# Patient Record
Sex: FEMALE | Race: WHITE | NOT HISPANIC OR LATINO | URBAN - METROPOLITAN AREA
[De-identification: names, ages, dates, MRNs, and addresses within clinical notes are randomized per-mention and may not be internally consistent; named-entity substitution may affect disease eponyms.]

---

## 2017-04-01 ENCOUNTER — EMERGENCY (EMERGENCY)
Facility: HOSPITAL | Age: 42
LOS: 1 days | Discharge: HOME | End: 2017-04-01
Attending: EMERGENCY MEDICINE | Admitting: EMERGENCY MEDICINE
Payer: MEDICARE

## 2017-04-01 VITALS
OXYGEN SATURATION: 100 % | RESPIRATION RATE: 15 BRPM | TEMPERATURE: 98 F | HEART RATE: 88 BPM | DIASTOLIC BLOOD PRESSURE: 91 MMHG | SYSTOLIC BLOOD PRESSURE: 143 MMHG

## 2017-04-01 VITALS
DIASTOLIC BLOOD PRESSURE: 93 MMHG | OXYGEN SATURATION: 100 % | SYSTOLIC BLOOD PRESSURE: 131 MMHG | RESPIRATION RATE: 18 BRPM | HEART RATE: 78 BPM

## 2017-04-01 LAB
ALBUMIN SERPL ELPH-MCNC: 4.3 G/DL — SIGNIFICANT CHANGE UP (ref 3.3–5)
ALP SERPL-CCNC: 76 U/L — SIGNIFICANT CHANGE UP (ref 40–120)
ALT FLD-CCNC: 11 U/L — SIGNIFICANT CHANGE UP (ref 4–33)
AST SERPL-CCNC: 18 U/L — SIGNIFICANT CHANGE UP (ref 4–32)
BASOPHILS # BLD AUTO: 0 K/UL — SIGNIFICANT CHANGE UP (ref 0–0.2)
BASOPHILS NFR BLD AUTO: 0 % — SIGNIFICANT CHANGE UP (ref 0–2)
BILIRUB SERPL-MCNC: 0.3 MG/DL — SIGNIFICANT CHANGE UP (ref 0.2–1.2)
BUN SERPL-MCNC: 12 MG/DL — SIGNIFICANT CHANGE UP (ref 7–23)
CALCIUM SERPL-MCNC: 10.3 MG/DL — SIGNIFICANT CHANGE UP (ref 8.4–10.5)
CHLORIDE SERPL-SCNC: 105 MMOL/L — SIGNIFICANT CHANGE UP (ref 98–107)
CK MB BLD-MCNC: 1 NG/ML — SIGNIFICANT CHANGE UP (ref 1–4.7)
CK MB BLD-MCNC: 1 NG/ML — SIGNIFICANT CHANGE UP (ref 1–4.7)
CK SERPL-CCNC: 62 U/L — SIGNIFICANT CHANGE UP (ref 25–170)
CK SERPL-CCNC: 65 U/L — SIGNIFICANT CHANGE UP (ref 25–170)
CO2 SERPL-SCNC: 21 MMOL/L — LOW (ref 22–31)
CREAT SERPL-MCNC: 0.78 MG/DL — SIGNIFICANT CHANGE UP (ref 0.5–1.3)
D DIMER BLD IA.RAPID-MCNC: < 150 NG/ML — SIGNIFICANT CHANGE UP
EOSINOPHIL # BLD AUTO: 0 K/UL — SIGNIFICANT CHANGE UP (ref 0–0.5)
EOSINOPHIL NFR BLD AUTO: 0 % — SIGNIFICANT CHANGE UP (ref 0–6)
GLUCOSE SERPL-MCNC: 92 MG/DL — SIGNIFICANT CHANGE UP (ref 70–99)
HCT VFR BLD CALC: 42.2 % — SIGNIFICANT CHANGE UP (ref 34.5–45)
HGB BLD-MCNC: 14.3 G/DL — SIGNIFICANT CHANGE UP (ref 11.5–15.5)
IMM GRANULOCYTES NFR BLD AUTO: 0.2 % — SIGNIFICANT CHANGE UP (ref 0–1.5)
LYMPHOCYTES # BLD AUTO: 2.76 K/UL — SIGNIFICANT CHANGE UP (ref 1–3.3)
LYMPHOCYTES # BLD AUTO: 33.8 % — SIGNIFICANT CHANGE UP (ref 13–44)
MCHC RBC-ENTMCNC: 31.6 PG — SIGNIFICANT CHANGE UP (ref 27–34)
MCHC RBC-ENTMCNC: 33.9 % — SIGNIFICANT CHANGE UP (ref 32–36)
MCV RBC AUTO: 93.2 FL — SIGNIFICANT CHANGE UP (ref 80–100)
MONOCYTES # BLD AUTO: 0.52 K/UL — SIGNIFICANT CHANGE UP (ref 0–0.9)
MONOCYTES NFR BLD AUTO: 6.4 % — SIGNIFICANT CHANGE UP (ref 2–14)
NEUTROPHILS # BLD AUTO: 4.86 K/UL — SIGNIFICANT CHANGE UP (ref 1.8–7.4)
NEUTROPHILS NFR BLD AUTO: 59.6 % — SIGNIFICANT CHANGE UP (ref 43–77)
PLATELET # BLD AUTO: 283 K/UL — SIGNIFICANT CHANGE UP (ref 150–400)
PMV BLD: 9.8 FL — SIGNIFICANT CHANGE UP (ref 7–13)
POTASSIUM SERPL-MCNC: 4.9 MMOL/L — SIGNIFICANT CHANGE UP (ref 3.5–5.3)
POTASSIUM SERPL-SCNC: 4.9 MMOL/L — SIGNIFICANT CHANGE UP (ref 3.5–5.3)
PROT SERPL-MCNC: 7.5 G/DL — SIGNIFICANT CHANGE UP (ref 6–8.3)
RBC # BLD: 4.53 M/UL — SIGNIFICANT CHANGE UP (ref 3.8–5.2)
RBC # FLD: 13.3 % — SIGNIFICANT CHANGE UP (ref 10.3–14.5)
SODIUM SERPL-SCNC: 142 MMOL/L — SIGNIFICANT CHANGE UP (ref 135–145)
TROPONIN T SERPL-MCNC: < 0.06 NG/ML — SIGNIFICANT CHANGE UP (ref 0–0.06)
TROPONIN T SERPL-MCNC: < 0.06 NG/ML — SIGNIFICANT CHANGE UP (ref 0–0.06)
WBC # BLD: 8.16 K/UL — SIGNIFICANT CHANGE UP (ref 3.8–10.5)
WBC # FLD AUTO: 8.16 K/UL — SIGNIFICANT CHANGE UP (ref 3.8–10.5)

## 2017-04-01 PROCEDURE — 71020: CPT | Mod: 26

## 2017-04-01 PROCEDURE — 99284 EMERGENCY DEPT VISIT MOD MDM: CPT

## 2017-04-01 RX ORDER — VENLAFAXINE HCL 75 MG
150 CAPSULE, EXT RELEASE 24 HR ORAL ONCE
Qty: 0 | Refills: 0 | Status: COMPLETED | OUTPATIENT
Start: 2017-04-01 | End: 2017-04-01

## 2017-04-01 RX ORDER — ACETAMINOPHEN 500 MG
650 TABLET ORAL ONCE
Qty: 0 | Refills: 0 | Status: COMPLETED | OUTPATIENT
Start: 2017-04-01 | End: 2017-04-01

## 2017-04-01 RX ORDER — IBUPROFEN 200 MG
600 TABLET ORAL ONCE
Qty: 0 | Refills: 0 | Status: COMPLETED | OUTPATIENT
Start: 2017-04-01 | End: 2017-04-01

## 2017-04-01 RX ORDER — FAMOTIDINE 10 MG/ML
20 INJECTION INTRAVENOUS ONCE
Qty: 0 | Refills: 0 | Status: COMPLETED | OUTPATIENT
Start: 2017-04-01 | End: 2017-04-01

## 2017-04-01 RX ORDER — LAMOTRIGINE 25 MG/1
150 TABLET, ORALLY DISINTEGRATING ORAL ONCE
Qty: 0 | Refills: 0 | Status: COMPLETED | OUTPATIENT
Start: 2017-04-01 | End: 2017-04-01

## 2017-04-01 RX ORDER — QUETIAPINE FUMARATE 200 MG/1
50 TABLET, FILM COATED ORAL ONCE
Qty: 0 | Refills: 0 | Status: COMPLETED | OUTPATIENT
Start: 2017-04-01 | End: 2017-04-01

## 2017-04-01 RX ADMIN — FAMOTIDINE 20 MILLIGRAM(S): 10 INJECTION INTRAVENOUS at 17:56

## 2017-04-01 RX ADMIN — Medication 0.5 MILLIGRAM(S): at 17:56

## 2017-04-01 RX ADMIN — LAMOTRIGINE 150 MILLIGRAM(S): 25 TABLET, ORALLY DISINTEGRATING ORAL at 17:56

## 2017-04-01 RX ADMIN — Medication 600 MILLIGRAM(S): at 18:01

## 2017-04-01 RX ADMIN — Medication 650 MILLIGRAM(S): at 14:19

## 2017-04-01 RX ADMIN — QUETIAPINE FUMARATE 50 MILLIGRAM(S): 200 TABLET, FILM COATED ORAL at 17:56

## 2017-04-01 RX ADMIN — Medication 150 MILLIGRAM(S): at 14:19

## 2017-04-01 NOTE — ED ADULT TRIAGE NOTE - CHIEF COMPLAINT QUOTE
PMH depression on Effexor, Seroquel, Ativan and Lamictal for seizures PMH depression on Effexor, Seroquel, Ativan and Lamictal for seizures  reports anxiety attack this am,

## 2017-04-01 NOTE — ED ADULT NURSE NOTE - OBJECTIVE STATEMENT
Jitendra RN: Pt recvd in intake rm 12, aox3, ambulatory, c/o constant stabbing R sided chest pain x 2 days, worse with deep breaths. Pt admits to being under stress for the past few days. Took Naproxen last night with no relief. Denies SOB, abd pain, n/v/d. SL placed, lab sent, NAD, will cont to monitor

## 2017-04-01 NOTE — PROVIDER CONTACT NOTE (OTHER) - ASSESSMENT
Medical team referred this case as pt has been medically cleared for discharge. Writer spoke with the medical team but Pt does not know the whereabouts and requesting cab.   Writer contacted [facility] - [phone] spoke with Roberto and confirmation # [id number] and pt will be picked up by [facility] cab service around 7 :40 pm. Writer informed the medical team about this intervention.

## 2017-04-01 NOTE — ED PROVIDER NOTE - PMH
Endometriosis    Epilepsy    Genital herpes    High cholesterol    PTSD (post-traumatic stress disorder)

## 2017-04-01 NOTE — ED PROVIDER NOTE - MEDICAL DECISION MAKING DETAILS
41 year old female with a PMHx of depression, psychogenic epileptic seizures, epilepsy, PTSD pw non-exertional CP for 2 days.  Plan: CBC, CMP, CE x2 EKGx2

## 2017-04-01 NOTE — ED PROVIDER NOTE - CARE PLAN
Principal Discharge DX:	Chest pain  Instructions for follow-up, activity and diet:	Take Motrin 600mg every 6 hours for mild-moderate pain as needed or alternate with Tylenol 650mg every 4 hours for pain as needed.  Follow up with your PCP within 48 hours for further evaluation.  Return to the Emergency Department for any new, worsening or concerning symptoms.

## 2017-04-01 NOTE — ED PROVIDER NOTE - ATTENDING CONTRIBUTION TO CARE
I , My Salgado M.D. have examined the patient and confirmed the essential components of the history, physical examination, diagnosis, and treatment plan. I agree with the patient's care as documented by the PA and amended herein by me. See note above for complete details of service.  42 yo F multiple med Hx Endometriosis, Epilepsy, HLD, PTSD who pw non-exertional CP x 2 d. R pectoral worse with inspiration. No other associated complaints. Exam reveals clear lungs, R upper chest wall ttp. CXR reveals NAD. Trop neg x 2. D-dimer neg. Pt remained HD stable during ED obs period. Pt stable for discharge. PMD follow up within 24 - 48 hours. DC instructions and warning signs for return given.

## 2017-04-01 NOTE — ED ADULT NURSE NOTE - CHIEF COMPLAINT QUOTE
PMH depression on Effexor, Seroquel, Ativan and Lamictal for seizures  reports anxiety attack this am,

## 2017-04-01 NOTE — ED PROVIDER NOTE - PLAN OF CARE
Take Motrin 600mg every 6 hours for mild-moderate pain as needed or alternate with Tylenol 650mg every 4 hours for pain as needed.  Follow up with your PCP within 48 hours for further evaluation.  Return to the Emergency Department for any new, worsening or concerning symptoms.

## 2017-04-01 NOTE — ED PROVIDER NOTE - OBJECTIVE STATEMENT
41 year old female with a PMHx of depression, psychogenic epileptic seizures, epilepsy, PTSD pw non-exertional CP for 2 days. Pain started off mild and intermittent 2 days ago then became constant, sharp in nature, 8/10 in severity, localized to the right pectoral region and worse with deep breaths this morning at 09:00 am. Admits to occasional left sided weakness which she is currently experiencing and shallow breathing secondary to pain with deep breaths. States that she has increased life stressors in the past day. Took Naproxen last night with no relief. Denies n/v/f/c, diaphoresis, jaw/shoulder pain, numbness/tingling in extremity abdominal pain, recent exercise/heavy lifting, swelling/tenderness in calves, foreign travel, tobacco use, OCP use, prolonged stasis. 41 year old female with a PMHx of depression, psychogenic epileptic seizures, epilepsy, PTSD pw non-exertional CP for 2 days. Pain started off mild and intermittent 2 days ago then became constant, sharp in nature, 8/10 in severity, localized to the right pectoral region and worse with deep breaths this morning at 09:00 am when she was sitting eating breakfast. Admits to occasional left sided weakness which she is currently experiencing and shallow breathing secondary to pain with deep breaths. States that she has increased life stressors in the past day. Took Naproxen last night with no relief. Denies syncope, dizziness, n/v/f/c, diaphoresis, jaw/shoulder pain, numbness/tingling in extremity abdominal pain, recent exercise/heavy lifting, swelling/tenderness in calves, foreign travel, tobacco use, OCP use, prolonged stasis.

## 2017-07-12 ENCOUNTER — EMERGENCY (EMERGENCY)
Facility: HOSPITAL | Age: 42
LOS: 1 days | Discharge: HOME | End: 2017-07-12
Attending: EMERGENCY MEDICINE | Admitting: EMERGENCY MEDICINE
Payer: MEDICARE

## 2017-07-12 VITALS
OXYGEN SATURATION: 100 % | HEART RATE: 90 BPM | RESPIRATION RATE: 16 BRPM | DIASTOLIC BLOOD PRESSURE: 74 MMHG | TEMPERATURE: 97 F | SYSTOLIC BLOOD PRESSURE: 129 MMHG

## 2017-07-12 DIAGNOSIS — F33.40 MAJOR DEPRESSIVE DISORDER, RECURRENT, IN REMISSION, UNSPECIFIED: ICD-10-CM

## 2017-07-12 PROCEDURE — 99284 EMERGENCY DEPT VISIT MOD MDM: CPT

## 2017-07-12 PROCEDURE — 90792 PSYCH DIAG EVAL W/MED SRVCS: CPT

## 2017-07-12 RX ADMIN — Medication 0.5 MILLIGRAM(S): at 12:44

## 2017-07-12 RX ADMIN — Medication 0.5 MILLIGRAM(S): at 17:43

## 2017-07-12 NOTE — ED PROVIDER NOTE - OBJECTIVE STATEMENT
43 y/o female with PMHx of Bipolar and Seizures on Lamictal presents to ED for difficulty sleeping X 1 week. Pt states having a history of bipolar with current episode of feeling manic and not sleeping for the past week. Pt denies any headache, fever, chills, nausea, vomiting, palpitations, SOB, chest pain, or abd pain. Denies any SI, HI, or hallucinations.
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
## 2017-07-12 NOTE — ED PROVIDER NOTE - PMH
Bipolar 1 disorder    Endometriosis    Epilepsy    Genital herpes    High cholesterol    Major depression    PTSD (post-traumatic stress disorder)    Seizures
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
## 2017-07-12 NOTE — ED PROVIDER NOTE - MEDICAL DECISION MAKING DETAILS
41 y/o female with difficulty sleeping and concern over a manic episode. Concern for Bipolar. Psych.

## 2017-07-12 NOTE — ED BEHAVIORAL HEALTH ASSESSMENT NOTE - CASE SUMMARY
42 year old, single, non-caregiver, disabled, domiciled [facility] in [place] since 12/14,  female with history of Borderline Personality Disorder, Psychogenic Seizures, Bipolar Disorder and post-traumatic stress disorder, history of suicide attempt, superficially cut her neck with glass, did not require sutures, no scars, PMH, no history of substance abuse, history of trauma, no legal issues or access to firearms, BIB EMS from home after patient felt manic and wanted to be evaluated.    Patient reports she felt "manic" and anxious and needed an evaluation. Patient goes into lengthy details about her mood swings; after lengthy speech, reports she feels better and would like to go home. Patient as a long history of similar presentations. Appears to be at her baseline. She denies SI/HI/I/P as well as judson and psychosis. Does not meet criteria for inpatient admission and will be discharged home with above plan.
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
## 2017-07-12 NOTE — ED BEHAVIORAL HEALTH NOTE - BEHAVIORAL HEALTH NOTE
Writer contacted [facility] to inquire about patient's transporation that was scheduled for 2pm.  Writer was informed they are, "on the way".

## 2017-07-12 NOTE — ED PROVIDER NOTE - NEURO NEGATIVE STATEMENT, MLM
no loss of consciousness, no gait abnormality, no headache, no sensory deficits, and no weakness. Difficulty sleeping.

## 2017-07-12 NOTE — ED BEHAVIORAL HEALTH NOTE - BEHAVIORAL HEALTH NOTE
Collateral obtained by Eduard Lantigua MS3   Per pt’s  (CM), Roselia Lynn ([phone]), pt has been “manic” for 6 days (started on 7/6). Pt asked staff this morning to go to ER. CM describes pt as "100x worse" than baseline with constant pacing, restlessness, disrupting other residents, interrupting staff conversations, and demanding to be part of private conversations not involving her. Pt has been sleeping less, about 2hrs per night. CM has not observed goal-directed activity, grandiosity, or increased irritability. CM describes that pt is “aggressive” but has not been violent or made verbal threats. Has not observed S/I/I/P or SIB. Concerned that pt is not following up with mental health providers and not managing medications properly. States pt will not let residence staff manage her medications. CM say that she does not feel pt is at risk of self-harm or harm to others. Pt has been keeping up with ADL’s.
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
## 2017-07-12 NOTE — ED BEHAVIORAL HEALTH NOTE - BEHAVIORAL HEALTH NOTE
Writer was informed patient was medically and psychiatrically cleared for discharge, but required livery for transportation.  Writer called [facility] 1 [phone] and requested for [address] [employer] Writer was informed patient was medically and psychiatrically cleared for discharge, but required liver for transportation.  Writer called [facility] 5  and spoke to Ruby.  Writer requested for [employer].  Writer was informed [address] [employer] cannot accommodate the trip and will send [facility] by 2pm.
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
## 2017-07-12 NOTE — ED BEHAVIORAL HEALTH NOTE - BEHAVIORAL HEALTH NOTE
Writer received a call from [employer] stating a white Toyota [employer] 4 will transport patient within the next 12 minutes. Writer went to the ER waiting area and informed patient who was agreeable.

## 2017-07-12 NOTE — ED BEHAVIORAL HEALTH ASSESSMENT NOTE - SUICIDE PROTECTIVE FACTORS
None known Supportive social network or family/Identifies reasons for living/Fear of death or dying due to pain/suffering/Responsibility to family and others/Future oriented/Engaged in work or school

## 2017-07-12 NOTE — ED BEHAVIORAL HEALTH ASSESSMENT NOTE - OTHER PAST PSYCHIATRIC HISTORY (INCLUDE DETAILS REGARDING ONSET, COURSE OF ILLNESS, INPATIENT/OUTPATIENT TREATMENT)
History of Borderline Personality Disorder, Bipolar Disorder and post-traumatic stress disorder, history of suicide attempt, superficially cut her neck with glass, did not require sutures. Had intake appointment today at [facility], will need to reschedule. History of Borderline Personality Disorder, Bipolar Disorder and post-traumatic stress disorder, history of suicide attempt, superficially cut her neck with glass, did not require sutures. In outpatient treatment with therapist Lianet Ji & Dr. Huntley.

## 2017-08-31 ENCOUNTER — INPATIENT (INPATIENT)
Facility: HOSPITAL | Age: 42
LOS: 7 days | Discharge: HOME | End: 2017-09-08
Attending: PSYCHIATRY & NEUROLOGY | Admitting: PSYCHIATRY & NEUROLOGY
Payer: MEDICARE

## 2017-08-31 VITALS
RESPIRATION RATE: 16 BRPM | DIASTOLIC BLOOD PRESSURE: 87 MMHG | OXYGEN SATURATION: 100 % | SYSTOLIC BLOOD PRESSURE: 127 MMHG | TEMPERATURE: 98 F | HEART RATE: 79 BPM

## 2017-08-31 DIAGNOSIS — F33.2 MAJOR DEPRESSIVE DISORDER, RECURRENT SEVERE WITHOUT PSYCHOTIC FEATURES: ICD-10-CM

## 2017-08-31 LAB
ALBUMIN SERPL ELPH-MCNC: 4.3 G/DL — SIGNIFICANT CHANGE UP (ref 3.3–5)
ALP SERPL-CCNC: 70 U/L — SIGNIFICANT CHANGE UP (ref 40–120)
ALT FLD-CCNC: 11 U/L — SIGNIFICANT CHANGE UP (ref 4–33)
AMPHET UR-MCNC: NEGATIVE — SIGNIFICANT CHANGE UP
APAP SERPL-MCNC: < 15 UG/ML — LOW (ref 15–25)
APPEARANCE UR: CLEAR — SIGNIFICANT CHANGE UP
AST SERPL-CCNC: 13 U/L — SIGNIFICANT CHANGE UP (ref 4–32)
BACTERIA # UR AUTO: SIGNIFICANT CHANGE UP
BARBITURATES MEASUREMENT: NEGATIVE — SIGNIFICANT CHANGE UP
BARBITURATES UR SCN-MCNC: NEGATIVE — SIGNIFICANT CHANGE UP
BASOPHILS # BLD AUTO: 0.01 K/UL — SIGNIFICANT CHANGE UP (ref 0–0.2)
BASOPHILS NFR BLD AUTO: 0.1 % — SIGNIFICANT CHANGE UP (ref 0–2)
BENZODIAZ SERPL-MCNC: NEGATIVE — SIGNIFICANT CHANGE UP
BENZODIAZ UR-MCNC: NEGATIVE — SIGNIFICANT CHANGE UP
BILIRUB SERPL-MCNC: 0.3 MG/DL — SIGNIFICANT CHANGE UP (ref 0.2–1.2)
BILIRUB UR-MCNC: NEGATIVE — SIGNIFICANT CHANGE UP
BLOOD UR QL VISUAL: HIGH
BUN SERPL-MCNC: 17 MG/DL — SIGNIFICANT CHANGE UP (ref 7–23)
CALCIUM SERPL-MCNC: 10.2 MG/DL — SIGNIFICANT CHANGE UP (ref 8.4–10.5)
CANNABINOIDS UR-MCNC: NEGATIVE — SIGNIFICANT CHANGE UP
CHLORIDE SERPL-SCNC: 103 MMOL/L — SIGNIFICANT CHANGE UP (ref 98–107)
CO2 SERPL-SCNC: 25 MMOL/L — SIGNIFICANT CHANGE UP (ref 22–31)
COCAINE METAB.OTHER UR-MCNC: NEGATIVE — SIGNIFICANT CHANGE UP
COD CRY URNS QL: SIGNIFICANT CHANGE UP (ref 0–0)
COLOR SPEC: YELLOW — SIGNIFICANT CHANGE UP
CREAT SERPL-MCNC: 1 MG/DL — SIGNIFICANT CHANGE UP (ref 0.5–1.3)
EOSINOPHIL # BLD AUTO: 0.02 K/UL — SIGNIFICANT CHANGE UP (ref 0–0.5)
EOSINOPHIL NFR BLD AUTO: 0.2 % — SIGNIFICANT CHANGE UP (ref 0–6)
ETHANOL BLD-MCNC: < 10 MG/DL — SIGNIFICANT CHANGE UP
GLUCOSE SERPL-MCNC: 122 MG/DL — HIGH (ref 70–99)
GLUCOSE UR-MCNC: NEGATIVE — SIGNIFICANT CHANGE UP
HCT VFR BLD CALC: 41.4 % — SIGNIFICANT CHANGE UP (ref 34.5–45)
HGB BLD-MCNC: 13.7 G/DL — SIGNIFICANT CHANGE UP (ref 11.5–15.5)
IMM GRANULOCYTES # BLD AUTO: 0.05 # — SIGNIFICANT CHANGE UP
IMM GRANULOCYTES NFR BLD AUTO: 0.5 % — SIGNIFICANT CHANGE UP (ref 0–1.5)
KETONES UR-MCNC: NEGATIVE — SIGNIFICANT CHANGE UP
LEUKOCYTE ESTERASE UR-ACNC: NEGATIVE — SIGNIFICANT CHANGE UP
LYMPHOCYTES # BLD AUTO: 2 K/UL — SIGNIFICANT CHANGE UP (ref 1–3.3)
LYMPHOCYTES # BLD AUTO: 20.1 % — SIGNIFICANT CHANGE UP (ref 13–44)
MCHC RBC-ENTMCNC: 30.6 PG — SIGNIFICANT CHANGE UP (ref 27–34)
MCHC RBC-ENTMCNC: 33.1 % — SIGNIFICANT CHANGE UP (ref 32–36)
MCV RBC AUTO: 92.4 FL — SIGNIFICANT CHANGE UP (ref 80–100)
METHADONE UR-MCNC: NEGATIVE — SIGNIFICANT CHANGE UP
MONOCYTES # BLD AUTO: 0.53 K/UL — SIGNIFICANT CHANGE UP (ref 0–0.9)
MONOCYTES NFR BLD AUTO: 5.3 % — SIGNIFICANT CHANGE UP (ref 2–14)
MUCOUS THREADS # UR AUTO: SIGNIFICANT CHANGE UP
NEUTROPHILS # BLD AUTO: 7.35 K/UL — SIGNIFICANT CHANGE UP (ref 1.8–7.4)
NEUTROPHILS NFR BLD AUTO: 73.8 % — SIGNIFICANT CHANGE UP (ref 43–77)
NITRITE UR-MCNC: NEGATIVE — SIGNIFICANT CHANGE UP
NON-SQ EPI CELLS # UR AUTO: <1 — SIGNIFICANT CHANGE UP
NRBC # FLD: 0 — SIGNIFICANT CHANGE UP
OPIATES UR-MCNC: NEGATIVE — SIGNIFICANT CHANGE UP
OXYCODONE UR-MCNC: NEGATIVE — SIGNIFICANT CHANGE UP
PCP UR-MCNC: POSITIVE — SIGNIFICANT CHANGE UP
PH UR: 6 — SIGNIFICANT CHANGE UP (ref 4.6–8)
PLATELET # BLD AUTO: 244 K/UL — SIGNIFICANT CHANGE UP (ref 150–400)
PMV BLD: 10 FL — SIGNIFICANT CHANGE UP (ref 7–13)
POTASSIUM SERPL-MCNC: 4.4 MMOL/L — SIGNIFICANT CHANGE UP (ref 3.5–5.3)
POTASSIUM SERPL-SCNC: 4.4 MMOL/L — SIGNIFICANT CHANGE UP (ref 3.5–5.3)
PROT SERPL-MCNC: 7.3 G/DL — SIGNIFICANT CHANGE UP (ref 6–8.3)
PROT UR-MCNC: 20 — SIGNIFICANT CHANGE UP
RBC # BLD: 4.48 M/UL — SIGNIFICANT CHANGE UP (ref 3.8–5.2)
RBC # FLD: 12.5 % — SIGNIFICANT CHANGE UP (ref 10.3–14.5)
RBC CASTS # UR COMP ASSIST: HIGH (ref 0–?)
SALICYLATES SERPL-MCNC: < 5 MG/DL — LOW (ref 15–30)
SODIUM SERPL-SCNC: 141 MMOL/L — SIGNIFICANT CHANGE UP (ref 135–145)
SP GR SPEC: 1.03 — SIGNIFICANT CHANGE UP (ref 1–1.03)
SQUAMOUS # UR AUTO: SIGNIFICANT CHANGE UP
TSH SERPL-MCNC: 0.81 UIU/ML — SIGNIFICANT CHANGE UP (ref 0.27–4.2)
UROBILINOGEN FLD QL: NORMAL E.U. — SIGNIFICANT CHANGE UP (ref 0.1–0.2)
WBC # BLD: 9.96 K/UL — SIGNIFICANT CHANGE UP (ref 3.8–10.5)
WBC # FLD AUTO: 9.96 K/UL — SIGNIFICANT CHANGE UP (ref 3.8–10.5)
WBC UR QL: SIGNIFICANT CHANGE UP (ref 0–?)

## 2017-08-31 PROCEDURE — 99285 EMERGENCY DEPT VISIT HI MDM: CPT

## 2017-08-31 RX ORDER — MULTIVIT-MIN/FERROUS GLUCONATE 9 MG/15 ML
1 LIQUID (ML) ORAL DAILY
Qty: 0 | Refills: 0 | Status: DISCONTINUED | OUTPATIENT
Start: 2017-08-31 | End: 2017-09-08

## 2017-08-31 RX ORDER — FAMOTIDINE 10 MG/ML
20 INJECTION INTRAVENOUS
Qty: 0 | Refills: 0 | Status: DISCONTINUED | OUTPATIENT
Start: 2017-08-31 | End: 2017-09-08

## 2017-08-31 RX ORDER — ACETAMINOPHEN 500 MG
650 TABLET ORAL ONCE
Qty: 0 | Refills: 0 | Status: COMPLETED | OUTPATIENT
Start: 2017-08-31 | End: 2017-08-31

## 2017-08-31 RX ORDER — LAMOTRIGINE 25 MG/1
150 TABLET, ORALLY DISINTEGRATING ORAL
Qty: 0 | Refills: 0 | Status: DISCONTINUED | OUTPATIENT
Start: 2017-08-31 | End: 2017-09-08

## 2017-08-31 RX ORDER — QUETIAPINE FUMARATE 200 MG/1
50 TABLET, FILM COATED ORAL AT BEDTIME
Qty: 0 | Refills: 0 | Status: DISCONTINUED | OUTPATIENT
Start: 2017-08-31 | End: 2017-09-01

## 2017-08-31 RX ORDER — VENLAFAXINE HCL 75 MG
37.5 CAPSULE, EXT RELEASE 24 HR ORAL DAILY
Qty: 0 | Refills: 0 | Status: DISCONTINUED | OUTPATIENT
Start: 2017-08-31 | End: 2017-09-01

## 2017-08-31 RX ORDER — ACETAMINOPHEN 500 MG
650 TABLET ORAL EVERY 6 HOURS
Qty: 0 | Refills: 0 | Status: DISCONTINUED | OUTPATIENT
Start: 2017-08-31 | End: 2017-09-08

## 2017-08-31 RX ADMIN — Medication 650 MILLIGRAM(S): at 16:46

## 2017-08-31 RX ADMIN — Medication 0.5 MILLIGRAM(S): at 18:45

## 2017-08-31 RX ADMIN — Medication 650 MILLIGRAM(S): at 21:12

## 2017-08-31 RX ADMIN — FAMOTIDINE 20 MILLIGRAM(S): 10 INJECTION INTRAVENOUS at 20:05

## 2017-08-31 RX ADMIN — Medication 0.5 MILLIGRAM(S): at 13:13

## 2017-08-31 RX ADMIN — LAMOTRIGINE 150 MILLIGRAM(S): 25 TABLET, ORALLY DISINTEGRATING ORAL at 20:05

## 2017-08-31 RX ADMIN — QUETIAPINE FUMARATE 50 MILLIGRAM(S): 200 TABLET, FILM COATED ORAL at 20:29

## 2017-08-31 NOTE — ED BEHAVIORAL HEALTH ASSESSMENT NOTE - PRIMARY DX
Major depressive disorder, recurrent, in remission Deferred condition on axis II Bipolar depression Borderline personality disorder

## 2017-08-31 NOTE — ED BEHAVIORAL HEALTH ASSESSMENT NOTE - PROFESSIONAL COLLATERAL PHONE
[phone]/ [phone]/ [phone] (cell: [phone])/ [phone]
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
## 2017-08-31 NOTE — ED BEHAVIORAL HEALTH ASSESSMENT NOTE - PATIENT'S CHIEF COMPLAINT
"I cut down my Effexor and it made me manic" "I'm going to cut my wrist." "I feel alone....I'm going to cut my wrist."

## 2017-08-31 NOTE — ED ADULT NURSE REASSESSMENT NOTE - NS ED NURSE REASSESS COMMENT FT1
pt transported to [place] [address] VIA SECURITY, SAFETY MAINTAINED.
13:30-Patient presents anxious, medication given as ordered, needs met, pt currently in BH room 15, awaiting further MD evaluation, will continue to monitor pt.

## 2017-08-31 NOTE — ED BEHAVIORAL HEALTH ASSESSMENT NOTE - CASE SUMMARY
42 year old, single, non-caregiver, disabled, domiciled [facility] in [place] since 12/14,  female with history of Borderline Personality Disorder, Psychogenic Seizures, Bipolar Disorder and post-traumatic stress disorder, history of suicide attempt, superficially cut her neck with glass, did not require sutures, no scars, PMH, no history of substance abuse, history of trauma, no legal issues or access to firearms, BIB EMS from home after patient felt manic and wanted to be evaluated.    Patient reports she felt "manic" and anxious and needed an evaluation. Patient goes into lengthy details about her mood swings; after lengthy speech, reports she feels better and would like to go home. Patient as a long history of similar presentations. Appears to be at her baseline. She denies SI/HI/I/P as well as judson and psychosis. Does not meet criteria for inpatient admission and will be discharged home with above plan.

## 2017-08-31 NOTE — ED PROVIDER NOTE - OBJECTIVE STATEMENT
This a 43 year old Female PMHx Bipolar Epilepsy HLD PTSD Seizures BIBA for if psych eval r/t increased anxiety and depression. Patient present crying tremulous anxious. Patient states she is experience increased anxiety and depression with suicidal thoughts with and idea to cut her throat.  She is requesting admission to [facility]

## 2017-08-31 NOTE — ED BEHAVIORAL HEALTH NOTE - BEHAVIORAL HEALTH NOTE
Patient endorsed acute SI and scheduled discharge cancelled.  This physician called and cancelled transport to home.

## 2017-08-31 NOTE — ED BEHAVIORAL HEALTH ASSESSMENT NOTE - PSYCHIATRIC ISSUES AND PLAN (INCLUDE STANDING AND PRN MEDICATION)
Restart outpatient regimen of Lamictal 150mg BID, Seroquel 50mg HS, Effexor XR 37.5mg daily with Ativan 0.5mg PO q6hr prn anxiety.  Advise coordination of care with Dr. Morejon, outpatient psychiatrist to review past medication trials.  Advise titration of Seroquel as tolerated by patient and as dictated by closer monitoring of mood symptoms by inpatient psych team.

## 2017-08-31 NOTE — ED BEHAVIORAL HEALTH ASSESSMENT NOTE - OTHER PAST PSYCHIATRIC HISTORY (INCLUDE DETAILS REGARDING ONSET, COURSE OF ILLNESS, INPATIENT/OUTPATIENT TREATMENT)
History of Borderline Personality Disorder, Bipolar Disorder and post-traumatic stress disorder, history of suicide attempt, superficially cut her neck with glass, did not require sutures. In outpatient treatment with therapist Lianet Ji & Dr. Huntley. History of Borderline Personality Disorder, Bipolar Disorder and post-traumatic stress disorder, history of suicide attempt, superficially cut her neck with glass, did not require sutures. In outpatient treatment with therapist Lianet Ji & Dr. Morejon at [facility] in [place], [place].

## 2017-08-31 NOTE — ED BEHAVIORAL HEALTH ASSESSMENT NOTE - REASON FOR REFERRAL
BIB EMS activated by patient as patient felt she was manic BIB EMS activated by patient as patient felt she was more depressed/anxious

## 2017-08-31 NOTE — ED PROVIDER NOTE - NS ED ROS FT
Denies chest pain, SOB, N/V/D and fevers, Denies palpitations or diaphoresis. Denies Numbness, Tingling, Blurry Vision and HA.  Denies homicidal thoughts. Denies visual/auditory hallucinations. Denies ETOH/Illicit drug use. Denies recent falls, trauma and injuries. Denies pain or any other medical complaints.

## 2017-08-31 NOTE — ED ADULT TRIAGE NOTE - CHIEF COMPLAINT QUOTE
Pt brought in by EMS for increased anxiety and depression for the past month, compliant with medication. Pt reports occasional SI denies HI, no plan.

## 2017-08-31 NOTE — ED BEHAVIORAL HEALTH ASSESSMENT NOTE - SUICIDE RISK FACTORS
Agitation/severe anxiety Unable to engage in safety planning/Agitation/severe anxiety/Anhedonia/Hopelessness/Mood episode

## 2017-08-31 NOTE — ED BEHAVIORAL HEALTH ASSESSMENT NOTE - DESCRIPTION
Ativan 0.5mg x 1 for agitation Nonepileptic seizures see HPI Patient is visibly upset, tearful, histrionic, inconsolable during ED course, is provided Ativan 0.5mg PO x 1 for anxiety with only minimal benefit.

## 2017-08-31 NOTE — ED BEHAVIORAL HEALTH ASSESSMENT NOTE - CURRENT MEDICATION
Lamictal 150 mg po BID, Effexor 37.5mg qd, Ativan PRN Lamictal 150 mg po BID, Effexor XR 37.5mg qd, Seroquel 50mg HS, Ativan 0.5mg PO QID PRN anxiety, Famotidine 20mg PO BID  [employer] database reviewed: Lorazepam 0.5mg dispensed #120 on 8/21/17 by Audi Morejon MD

## 2017-08-31 NOTE — ED BEHAVIORAL HEALTH ASSESSMENT NOTE - HPI (INCLUDE ILLNESS QUALITY, SEVERITY, DURATION, TIMING, CONTEXT, MODIFYING FACTORS, ASSOCIATED SIGNS AND SYMPTOMS)
42 year old, single, non-caregiver, disabled, domiciled [facility] in [place] since 12/14,  female with history of Borderline Personality Disorder, Psychogenic Seizures, Bipolar Disorder and post-traumatic stress disorder, history of suicide attempt, superficially cut her neck with glass, did not require sutures, no scars, PMH, no history of substance abuse, history of trauma, no legal issues or access to firearms, BIB EMS from home after patient felt manic and wanted to be evaluated.    Patient is well known to [facility], who has been seeing patient in the ED setting for the past few years intermittently. Patient reports she feels she is manic after discontinuing Effexor & states she has had trouble sleeping. However, she reports she has had no nights of global insomnia, denies any other changes to her behavior/mood, and states she is feeling generally euthymic. Patient denies any depressive symptoms including depressed mood, anhedonia, changes in energy/concentration/appetite, sleep disturbances, preoccupation with death or feelings of guilt. Patient does not report nor exhibit any signs of judson, including irritable or elevated mood, grandiosity, pressured speech, risk-taking behaviors, increase in productivity or agitation. Patient denies any hallucinations, does not report any delusional thought content, denies thought insertion/withdrawal, denies referential thought processes & is not paranoid on interview. Pt is linear, logical, organized and well related. She denies any SI, intent or plan. She denies any HI, violent thoughts. She denies any abuse, denies any drug use, denies ETOH use. She is future-oriented & hopeful, stating she is looking forward to an upcoming interview with a . 42 year old, single, non-caregiver, disabled, domiciled [facility] in [place] since 12/14,  female with history of Borderline Personality Disorder, Psychogenic Seizures, Bipolar Disorder and post-traumatic stress disorder, history of suicide attempt, superficially cut her neck with glass, did not require sutures, no scars, PMH, no history of substance abuse, history of trauma, no legal issues or access to firearms, BIB EMS from home after patient felt increasingly depressed and anxious.    Patient is well known to , who has been seeing patient in the ED setting for the past few years intermittently. Patient reports she feels she is feeling more depressed and anxious x past month, reports suicidal ideation with stated plan to cut her wrist, unable to contract for safety.  Precipitating factor seems to be that patient's boyfriend moved out of the nursing home today.  Patient has reportedly been more reliant on Ativan prn for anxiety lately and endorses depressed mood, anhedonia, feelings of hopelessness and helplessness, excessive sleep, poor energy and states she's unable to be safe in the community and expresses a lack of confidence in her outpatient psychiatrist.  There is no evidence of acute manic/hypomanic symptoms but patient is histrionic, tearful throughout ED course.  Patient with no HI, denies any hallucinations, does not report any delusional thought content, denies thought insertion/withdrawal, denies referential thought processes & is not paranoid on interview. Pt is linear, logical, organized and well related. 42 year old, single, non-caregiver, disabled, domiciled [facility] in [place] since 12/14,  female with history of Borderline Personality Disorder, Psychogenic Seizures, Bipolar Disorder and post-traumatic stress disorder, history of suicide attempt, superficially cut her neck with glass, did not require sutures, no scars, PMH, no history of substance abuse, history of trauma, no legal issues or access to firearms, BIB EMS from home after patient felt increasingly depressed and anxious.    Patient is well known to [facility] team, who has been seen in the ED setting for the past few years intermittently. Patient reports she feels she is feeling more depressed and anxious x past month, reports suicidal ideation with stated plan to cut her wrist, unable to contract for safety.  Precipitating factor seems to be that patient's boyfriend moved out of the nursing home today.  Patient has reportedly been more reliant on Ativan prn for anxiety lately and endorses depressed mood, anhedonia, feelings of hopelessness and helplessness, excessive sleep, poor energy and states she's unable to be safe in the community and expresses a lack of confidence in her outpatient psychiatrist.  There is no evidence of acute manic/hypomanic symptoms but patient is histrionic, tearful throughout ED course.  Patient with no HI, denies any hallucinations, does not report any delusional thought content, denies thought insertion/withdrawal, denies referential thought processes & is not paranoid on interview. Pt is linear, logical, organized and well related.    Collateral obtained from patient's therapist, Ame who is a licensed mental health counselor, reports a diagnosis of Bipolar I disorder, borderline personality disorder, last seen on 8/25/17, spoke to patient yesterday and reports worsening mood symptoms.  She reports that patient was noted to be "more manic" one month ago but since then has been increasingly depressed.  Ame, patient's therapist, reports patient has a history of dictating own treatment to psychiatrist and only sporadic compliance with appointments.  Patient has declined offer to be seen as a walk-in at [facility] instead coming to the ER.  Patient sees psychiatrist, Dr. Morejon, most recently about 2 weeks ago.  Collateral from patient's  indicates a history of attention seeking behavior.

## 2017-08-31 NOTE — ED PROVIDER NOTE - MEDICAL DECISION MAKING DETAILS
This a 43 year old Female PMHx Bipolar Epilepsy HLD PTSD Seizures BIBA for if psych eval r/t increased anxiety and depression. Patient present crying tremulous anxious. Medical evaluation performed. There is no clinical evidence of intoxication or any acute medical problem requiring immediate intervention. Final disposition will be determined by psychiatrist.

## 2017-08-31 NOTE — ED BEHAVIORAL HEALTH NOTE - BEHAVIORAL HEALTH NOTE
Patient is a 42 year old female sent to the emergency room for depression and anxiety.  Writer was informed patient was medically and psychiatrically cleared for discharge, but required livery for transport.  Writer called [facility]  and spoke to Rowena who called [facility] [facility] Patient is a 42 year old female sent to the emergency room for depression and anxiety.  Writer was informed patient was medically and psychiatrically cleared for discharge, but required livery for transport.  Writer called [facility]  and spoke to Rowena who called [facility] and arranged for  with in 30 minutes using confirmation #[id number] Patient is a 42 year old female sent to the emergency room for depression and anxiety.  Writer was informed by Dr. Valle patient was medically and psychiatrically cleared for discharge, but required livery for transport.  Writer called [facility]  and spoke to Rowena who called [facility] and arranged for  with in 30 minutes using confirmation #[id number]

## 2017-08-31 NOTE — ED BEHAVIORAL HEALTH ASSESSMENT NOTE - SUICIDE PROTECTIVE FACTORS
Supportive social network or family/Fear of death or dying due to pain/suffering/Responsibility to family and others/Future oriented/Identifies reasons for living/Engaged in work or school Supportive social network or family/Fear of death or dying due to pain/suffering

## 2017-08-31 NOTE — ED BEHAVIORAL HEALTH ASSESSMENT NOTE - SUBSTANCE ISSUES AND PLAN (INCLUDE STANDING AND PRN MEDICATION)
no evidence of substance abuse issues, reportedly takes Lorazepam as prescribed, no indication for CIWA/CINA/taper/detox

## 2017-08-31 NOTE — ED PROVIDER NOTE - CARE PLAN
Principal Discharge DX:	Bipolar 1 disorder  Secondary Diagnosis:	PTSD (post-traumatic stress disorder)  Secondary Diagnosis:	Substance abuse

## 2017-08-31 NOTE — ED BEHAVIORAL HEALTH ASSESSMENT NOTE - SUMMARY
42 year old, single, non-caregiver, disabled, domiciled [facility] in [place] since 12/14,  female with history of Borderline Personality Disorder, Psychogenic Seizures, Bipolar Disorder and post-traumatic stress disorder, history of suicide attempt, superficially cut her neck with glass, did not require sutures, no scars, PMH, no history of substance abuse, history of trauma, no legal issues or access to firearms, BIB EMS from home after patient felt manic and wanted to be evaluated.  In ED, patient is not depressed, not psychotic, not suicidal, not homicidal. She is reporting sleep disturbance, but no global insomnia. Collateral from  indicates there was a concern that patient may be manic as she has been more interruptive, but they deny patient being aggressive, suicidal or threatening. She does not meet criteria for a manic episode at this time. She is future-oriented and hopeful, well related, appropriate. She will be discharged home via ambulance to her residence. 42 year old, single, non-caregiver, disabled, domiciled [facility] in [place] since 12/14,  female with history of Borderline Personality Disorder, Psychogenic Seizures, Bipolar Disorder and post-traumatic stress disorder, history of suicide attempt, superficially cut her neck with glass, did not require sutures, no scars, PMH, no history of substance abuse, history of trauma, no legal issues or access to firearms, BIB EMS from home after patient felt increasingly depressed and anxious.  Patient reports she feels she is feeling more depressed and anxious x past month, reports suicidal ideation with stated plan to cut her wrist, unable to contract for safety.  Precipitating factor seems to be that patient's boyfriend moved out of the nursing home today.  Patient has reportedly been more reliant on Ativan prn for anxiety lately and endorses depressed mood, anhedonia, feelings of hopelessness and helplessness, excessive sleep, poor energy and states she's unable to be safe in the community and expresses a lack of confidence in her outpatient psychiatrist.  There is no evidence of acute manic/hypomanic symptoms but patient is histrionic, tearful throughout ED course.  Patient with no HI, denies any hallucinations, does not report any delusional thought content, denies thought insertion/withdrawal, denies referential thought processes & is not paranoid on interview.  Collateral obtained from patient's therapist, Ame reports that patient was noted to be "more manic" one month ago but since then has been increasingly depressed.  Patient represents an acute risk to self and to be admitted to [facility] on a 9.13, voluntary legal status.  No need for constant observation in a locked, supervised setting.  Transportation to unit accompanied by security.

## 2017-09-01 DIAGNOSIS — F60.3 BORDERLINE PERSONALITY DISORDER: ICD-10-CM

## 2017-09-01 DIAGNOSIS — F31.30 BIPOLAR DISORDER, CURRENT EPISODE DEPRESSED, MILD OR MODERATE SEVERITY, UNSPECIFIED: ICD-10-CM

## 2017-09-01 PROCEDURE — 93010 ELECTROCARDIOGRAM REPORT: CPT

## 2017-09-01 PROCEDURE — 99223 1ST HOSP IP/OBS HIGH 75: CPT

## 2017-09-01 RX ORDER — QUETIAPINE FUMARATE 200 MG/1
100 TABLET, FILM COATED ORAL AT BEDTIME
Qty: 0 | Refills: 0 | Status: DISCONTINUED | OUTPATIENT
Start: 2017-09-01 | End: 2017-09-04

## 2017-09-01 RX ADMIN — FAMOTIDINE 20 MILLIGRAM(S): 10 INJECTION INTRAVENOUS at 08:11

## 2017-09-01 RX ADMIN — Medication 0.5 MILLIGRAM(S): at 14:44

## 2017-09-01 RX ADMIN — QUETIAPINE FUMARATE 100 MILLIGRAM(S): 200 TABLET, FILM COATED ORAL at 21:08

## 2017-09-01 RX ADMIN — Medication 0.5 MILLIGRAM(S): at 08:10

## 2017-09-01 RX ADMIN — FAMOTIDINE 20 MILLIGRAM(S): 10 INJECTION INTRAVENOUS at 21:08

## 2017-09-01 RX ADMIN — LAMOTRIGINE 150 MILLIGRAM(S): 25 TABLET, ORALLY DISINTEGRATING ORAL at 21:08

## 2017-09-01 RX ADMIN — Medication 1 TABLET(S): at 10:49

## 2017-09-01 RX ADMIN — LAMOTRIGINE 150 MILLIGRAM(S): 25 TABLET, ORALLY DISINTEGRATING ORAL at 08:11

## 2017-09-01 RX ADMIN — Medication 650 MILLIGRAM(S): at 11:16

## 2017-09-01 RX ADMIN — Medication 37.5 MILLIGRAM(S): at 08:11

## 2017-09-02 LAB
CHOLEST SERPL-MCNC: 249 MG/DL — HIGH (ref 120–199)
HBA1C BLD-MCNC: 5.7 % — HIGH (ref 4–5.6)
HDLC SERPL-MCNC: 73 MG/DL — HIGH (ref 45–65)
LIPID PNL WITH DIRECT LDL SERPL: 175 MG/DL — SIGNIFICANT CHANGE UP
TRIGL SERPL-MCNC: 89 MG/DL — SIGNIFICANT CHANGE UP (ref 10–149)

## 2017-09-02 PROCEDURE — 99232 SBSQ HOSP IP/OBS MODERATE 35: CPT

## 2017-09-02 RX ORDER — HYDROXYZINE HCL 10 MG
50 TABLET ORAL ONCE
Qty: 0 | Refills: 0 | Status: DISCONTINUED | OUTPATIENT
Start: 2017-09-02 | End: 2017-09-05

## 2017-09-02 RX ADMIN — QUETIAPINE FUMARATE 100 MILLIGRAM(S): 200 TABLET, FILM COATED ORAL at 21:03

## 2017-09-02 RX ADMIN — FAMOTIDINE 20 MILLIGRAM(S): 10 INJECTION INTRAVENOUS at 21:03

## 2017-09-02 RX ADMIN — LAMOTRIGINE 150 MILLIGRAM(S): 25 TABLET, ORALLY DISINTEGRATING ORAL at 08:20

## 2017-09-02 RX ADMIN — LAMOTRIGINE 150 MILLIGRAM(S): 25 TABLET, ORALLY DISINTEGRATING ORAL at 21:03

## 2017-09-02 RX ADMIN — Medication 0.5 MILLIGRAM(S): at 08:20

## 2017-09-02 RX ADMIN — Medication 0.5 MILLIGRAM(S): at 14:43

## 2017-09-02 RX ADMIN — FAMOTIDINE 20 MILLIGRAM(S): 10 INJECTION INTRAVENOUS at 08:20

## 2017-09-03 PROCEDURE — 99232 SBSQ HOSP IP/OBS MODERATE 35: CPT

## 2017-09-03 RX ORDER — IBUPROFEN 200 MG
600 TABLET ORAL EVERY 6 HOURS
Qty: 0 | Refills: 0 | Status: DISCONTINUED | OUTPATIENT
Start: 2017-09-03 | End: 2017-09-08

## 2017-09-03 RX ADMIN — Medication 600 MILLIGRAM(S): at 09:05

## 2017-09-03 RX ADMIN — FAMOTIDINE 20 MILLIGRAM(S): 10 INJECTION INTRAVENOUS at 08:39

## 2017-09-03 RX ADMIN — FAMOTIDINE 20 MILLIGRAM(S): 10 INJECTION INTRAVENOUS at 21:04

## 2017-09-03 RX ADMIN — Medication 0.5 MILLIGRAM(S): at 08:44

## 2017-09-03 RX ADMIN — QUETIAPINE FUMARATE 100 MILLIGRAM(S): 200 TABLET, FILM COATED ORAL at 21:04

## 2017-09-03 RX ADMIN — LAMOTRIGINE 150 MILLIGRAM(S): 25 TABLET, ORALLY DISINTEGRATING ORAL at 08:39

## 2017-09-03 RX ADMIN — Medication 650 MILLIGRAM(S): at 23:03

## 2017-09-03 RX ADMIN — Medication 600 MILLIGRAM(S): at 10:05

## 2017-09-03 RX ADMIN — Medication 1 TABLET(S): at 08:39

## 2017-09-03 RX ADMIN — Medication 0.5 MILLIGRAM(S): at 14:52

## 2017-09-03 RX ADMIN — LAMOTRIGINE 150 MILLIGRAM(S): 25 TABLET, ORALLY DISINTEGRATING ORAL at 21:04

## 2017-09-04 PROCEDURE — 99231 SBSQ HOSP IP/OBS SF/LOW 25: CPT

## 2017-09-04 RX ORDER — QUETIAPINE FUMARATE 200 MG/1
150 TABLET, FILM COATED ORAL AT BEDTIME
Qty: 0 | Refills: 0 | Status: DISCONTINUED | OUTPATIENT
Start: 2017-09-04 | End: 2017-09-05

## 2017-09-04 RX ADMIN — QUETIAPINE FUMARATE 150 MILLIGRAM(S): 200 TABLET, FILM COATED ORAL at 20:30

## 2017-09-04 RX ADMIN — FAMOTIDINE 20 MILLIGRAM(S): 10 INJECTION INTRAVENOUS at 09:03

## 2017-09-04 RX ADMIN — Medication 0.5 MILLIGRAM(S): at 09:03

## 2017-09-04 RX ADMIN — FAMOTIDINE 20 MILLIGRAM(S): 10 INJECTION INTRAVENOUS at 20:29

## 2017-09-04 RX ADMIN — Medication 0.5 MILLIGRAM(S): at 17:34

## 2017-09-04 RX ADMIN — Medication 1 TABLET(S): at 09:03

## 2017-09-04 RX ADMIN — LAMOTRIGINE 150 MILLIGRAM(S): 25 TABLET, ORALLY DISINTEGRATING ORAL at 20:29

## 2017-09-04 RX ADMIN — LAMOTRIGINE 150 MILLIGRAM(S): 25 TABLET, ORALLY DISINTEGRATING ORAL at 09:03

## 2017-09-05 PROCEDURE — 99232 SBSQ HOSP IP/OBS MODERATE 35: CPT

## 2017-09-05 RX ORDER — QUETIAPINE FUMARATE 200 MG/1
100 TABLET, FILM COATED ORAL AT BEDTIME
Qty: 0 | Refills: 0 | Status: DISCONTINUED | OUTPATIENT
Start: 2017-09-05 | End: 2017-09-08

## 2017-09-05 RX ORDER — CLONAZEPAM 1 MG
0.5 TABLET ORAL THREE TIMES A DAY
Qty: 0 | Refills: 0 | Status: DISCONTINUED | OUTPATIENT
Start: 2017-09-05 | End: 2017-09-08

## 2017-09-05 RX ORDER — DIPHENHYDRAMINE HCL 50 MG
50 CAPSULE ORAL EVERY 6 HOURS
Qty: 0 | Refills: 0 | Status: DISCONTINUED | OUTPATIENT
Start: 2017-09-05 | End: 2017-09-08

## 2017-09-05 RX ADMIN — QUETIAPINE FUMARATE 100 MILLIGRAM(S): 200 TABLET, FILM COATED ORAL at 20:32

## 2017-09-05 RX ADMIN — FAMOTIDINE 20 MILLIGRAM(S): 10 INJECTION INTRAVENOUS at 20:32

## 2017-09-05 RX ADMIN — FAMOTIDINE 20 MILLIGRAM(S): 10 INJECTION INTRAVENOUS at 08:55

## 2017-09-05 RX ADMIN — Medication 0.5 MILLIGRAM(S): at 08:55

## 2017-09-05 RX ADMIN — LAMOTRIGINE 150 MILLIGRAM(S): 25 TABLET, ORALLY DISINTEGRATING ORAL at 08:55

## 2017-09-05 RX ADMIN — Medication 0.5 MILLIGRAM(S): at 12:40

## 2017-09-05 RX ADMIN — Medication 0.5 MILLIGRAM(S): at 20:32

## 2017-09-05 RX ADMIN — LAMOTRIGINE 150 MILLIGRAM(S): 25 TABLET, ORALLY DISINTEGRATING ORAL at 20:32

## 2017-09-05 RX ADMIN — Medication 1 TABLET(S): at 08:55

## 2017-09-06 PROCEDURE — 99232 SBSQ HOSP IP/OBS MODERATE 35: CPT

## 2017-09-06 RX ADMIN — Medication 0.5 MILLIGRAM(S): at 12:50

## 2017-09-06 RX ADMIN — Medication 0.5 MILLIGRAM(S): at 08:53

## 2017-09-06 RX ADMIN — FAMOTIDINE 20 MILLIGRAM(S): 10 INJECTION INTRAVENOUS at 20:57

## 2017-09-06 RX ADMIN — Medication 0.5 MILLIGRAM(S): at 20:57

## 2017-09-06 RX ADMIN — LAMOTRIGINE 150 MILLIGRAM(S): 25 TABLET, ORALLY DISINTEGRATING ORAL at 20:57

## 2017-09-06 RX ADMIN — QUETIAPINE FUMARATE 100 MILLIGRAM(S): 200 TABLET, FILM COATED ORAL at 20:57

## 2017-09-06 RX ADMIN — FAMOTIDINE 20 MILLIGRAM(S): 10 INJECTION INTRAVENOUS at 08:53

## 2017-09-06 RX ADMIN — Medication 1 TABLET(S): at 09:32

## 2017-09-06 RX ADMIN — LAMOTRIGINE 150 MILLIGRAM(S): 25 TABLET, ORALLY DISINTEGRATING ORAL at 08:53

## 2017-09-07 PROCEDURE — 99232 SBSQ HOSP IP/OBS MODERATE 35: CPT

## 2017-09-07 RX ORDER — QUETIAPINE FUMARATE 200 MG/1
1 TABLET, FILM COATED ORAL
Qty: 15 | Refills: 0 | OUTPATIENT
Start: 2017-09-07 | End: 2017-09-22

## 2017-09-07 RX ORDER — CLONAZEPAM 1 MG
1 TABLET ORAL
Qty: 42 | Refills: 0 | OUTPATIENT
Start: 2017-09-07 | End: 2017-09-21

## 2017-09-07 RX ORDER — FAMOTIDINE 10 MG/ML
1 INJECTION INTRAVENOUS
Qty: 30 | Refills: 0 | OUTPATIENT
Start: 2017-09-07 | End: 2017-09-22

## 2017-09-07 RX ORDER — MULTIVIT-MIN/FERROUS GLUCONATE 9 MG/15 ML
1 LIQUID (ML) ORAL
Qty: 0 | Refills: 0 | COMMUNITY
Start: 2017-09-07

## 2017-09-07 RX ORDER — VENLAFAXINE HCL 75 MG
1 CAPSULE, EXT RELEASE 24 HR ORAL
Qty: 0 | Refills: 0 | COMMUNITY

## 2017-09-07 RX ORDER — LAMOTRIGINE 25 MG/1
1 TABLET, ORALLY DISINTEGRATING ORAL
Qty: 30 | Refills: 0 | OUTPATIENT
Start: 2017-09-07 | End: 2017-09-22

## 2017-09-07 RX ADMIN — FAMOTIDINE 20 MILLIGRAM(S): 10 INJECTION INTRAVENOUS at 08:12

## 2017-09-07 RX ADMIN — Medication 0.5 MILLIGRAM(S): at 08:12

## 2017-09-07 RX ADMIN — LAMOTRIGINE 150 MILLIGRAM(S): 25 TABLET, ORALLY DISINTEGRATING ORAL at 20:46

## 2017-09-07 RX ADMIN — Medication 0.5 MILLIGRAM(S): at 20:46

## 2017-09-07 RX ADMIN — LAMOTRIGINE 150 MILLIGRAM(S): 25 TABLET, ORALLY DISINTEGRATING ORAL at 08:12

## 2017-09-07 RX ADMIN — Medication 0.5 MILLIGRAM(S): at 12:37

## 2017-09-07 RX ADMIN — FAMOTIDINE 20 MILLIGRAM(S): 10 INJECTION INTRAVENOUS at 20:46

## 2017-09-07 RX ADMIN — QUETIAPINE FUMARATE 100 MILLIGRAM(S): 200 TABLET, FILM COATED ORAL at 20:46

## 2017-09-07 RX ADMIN — Medication 1 TABLET(S): at 12:17

## 2017-09-08 VITALS — TEMPERATURE: 98 F

## 2017-09-08 PROCEDURE — 99238 HOSP IP/OBS DSCHRG MGMT 30/<: CPT

## 2017-09-08 RX ADMIN — FAMOTIDINE 20 MILLIGRAM(S): 10 INJECTION INTRAVENOUS at 08:37

## 2017-09-08 RX ADMIN — Medication 0.5 MILLIGRAM(S): at 08:37

## 2017-09-08 RX ADMIN — Medication 1 TABLET(S): at 09:48

## 2017-09-08 RX ADMIN — LAMOTRIGINE 150 MILLIGRAM(S): 25 TABLET, ORALLY DISINTEGRATING ORAL at 08:38

## 2017-09-08 RX ADMIN — Medication 0.5 MILLIGRAM(S): at 12:18

## 2017-09-26 ENCOUNTER — EMERGENCY (EMERGENCY)
Facility: HOSPITAL | Age: 42
LOS: 1 days | Discharge: HOME | End: 2017-09-26
Admitting: EMERGENCY MEDICINE
Payer: MEDICARE

## 2017-09-26 VITALS
DIASTOLIC BLOOD PRESSURE: 77 MMHG | OXYGEN SATURATION: 100 % | HEART RATE: 79 BPM | SYSTOLIC BLOOD PRESSURE: 107 MMHG | RESPIRATION RATE: 16 BRPM

## 2017-09-26 DIAGNOSIS — F60.3 BORDERLINE PERSONALITY DISORDER: ICD-10-CM

## 2017-09-26 DIAGNOSIS — F43.23 ADJUSTMENT DISORDER WITH MIXED ANXIETY AND DEPRESSED MOOD: ICD-10-CM

## 2017-09-26 LAB
ALBUMIN SERPL ELPH-MCNC: 4.5 G/DL — SIGNIFICANT CHANGE UP (ref 3.3–5)
ALP SERPL-CCNC: 85 U/L — SIGNIFICANT CHANGE UP (ref 40–120)
ALT FLD-CCNC: 6 U/L — SIGNIFICANT CHANGE UP (ref 4–33)
APAP SERPL-MCNC: < 15 UG/ML — LOW (ref 15–25)
AST SERPL-CCNC: 13 U/L — SIGNIFICANT CHANGE UP (ref 4–32)
BARBITURATES MEASUREMENT: NEGATIVE — SIGNIFICANT CHANGE UP
BASOPHILS # BLD AUTO: 0.03 K/UL — SIGNIFICANT CHANGE UP (ref 0–0.2)
BASOPHILS NFR BLD AUTO: 0.3 % — SIGNIFICANT CHANGE UP (ref 0–2)
BENZODIAZ SERPL-MCNC: NEGATIVE — SIGNIFICANT CHANGE UP
BILIRUB SERPL-MCNC: 0.3 MG/DL — SIGNIFICANT CHANGE UP (ref 0.2–1.2)
BUN SERPL-MCNC: 13 MG/DL — SIGNIFICANT CHANGE UP (ref 7–23)
CALCIUM SERPL-MCNC: 10.3 MG/DL — SIGNIFICANT CHANGE UP (ref 8.4–10.5)
CHLORIDE SERPL-SCNC: 106 MMOL/L — SIGNIFICANT CHANGE UP (ref 98–107)
CO2 SERPL-SCNC: 22 MMOL/L — SIGNIFICANT CHANGE UP (ref 22–31)
CREAT SERPL-MCNC: 1 MG/DL — SIGNIFICANT CHANGE UP (ref 0.5–1.3)
EOSINOPHIL # BLD AUTO: 0.04 K/UL — SIGNIFICANT CHANGE UP (ref 0–0.5)
EOSINOPHIL NFR BLD AUTO: 0.3 % — SIGNIFICANT CHANGE UP (ref 0–6)
ETHANOL BLD-MCNC: < 10 MG/DL — SIGNIFICANT CHANGE UP
GLUCOSE SERPL-MCNC: 129 MG/DL — HIGH (ref 70–99)
HCT VFR BLD CALC: 42.2 % — SIGNIFICANT CHANGE UP (ref 34.5–45)
HGB BLD-MCNC: 13.9 G/DL — SIGNIFICANT CHANGE UP (ref 11.5–15.5)
IMM GRANULOCYTES # BLD AUTO: 0.05 # — SIGNIFICANT CHANGE UP
IMM GRANULOCYTES NFR BLD AUTO: 0.4 % — SIGNIFICANT CHANGE UP (ref 0–1.5)
LYMPHOCYTES # BLD AUTO: 2.85 K/UL — SIGNIFICANT CHANGE UP (ref 1–3.3)
LYMPHOCYTES # BLD AUTO: 24 % — SIGNIFICANT CHANGE UP (ref 13–44)
MCHC RBC-ENTMCNC: 30 PG — SIGNIFICANT CHANGE UP (ref 27–34)
MCHC RBC-ENTMCNC: 32.9 % — SIGNIFICANT CHANGE UP (ref 32–36)
MCV RBC AUTO: 90.9 FL — SIGNIFICANT CHANGE UP (ref 80–100)
MONOCYTES # BLD AUTO: 0.69 K/UL — SIGNIFICANT CHANGE UP (ref 0–0.9)
MONOCYTES NFR BLD AUTO: 5.8 % — SIGNIFICANT CHANGE UP (ref 2–14)
NEUTROPHILS # BLD AUTO: 8.22 K/UL — HIGH (ref 1.8–7.4)
NEUTROPHILS NFR BLD AUTO: 69.2 % — SIGNIFICANT CHANGE UP (ref 43–77)
NRBC # FLD: 0 — SIGNIFICANT CHANGE UP
PLATELET # BLD AUTO: 274 K/UL — SIGNIFICANT CHANGE UP (ref 150–400)
PMV BLD: 9.8 FL — SIGNIFICANT CHANGE UP (ref 7–13)
POTASSIUM SERPL-MCNC: 5.1 MMOL/L — SIGNIFICANT CHANGE UP (ref 3.5–5.3)
POTASSIUM SERPL-SCNC: 5.1 MMOL/L — SIGNIFICANT CHANGE UP (ref 3.5–5.3)
PROT SERPL-MCNC: 7.1 G/DL — SIGNIFICANT CHANGE UP (ref 6–8.3)
RBC # BLD: 4.64 M/UL — SIGNIFICANT CHANGE UP (ref 3.8–5.2)
RBC # FLD: 12.4 % — SIGNIFICANT CHANGE UP (ref 10.3–14.5)
SALICYLATES SERPL-MCNC: < 5 MG/DL — LOW (ref 15–30)
SODIUM SERPL-SCNC: 143 MMOL/L — SIGNIFICANT CHANGE UP (ref 135–145)
TSH SERPL-MCNC: 1.02 UIU/ML — SIGNIFICANT CHANGE UP (ref 0.27–4.2)
WBC # BLD: 11.88 K/UL — HIGH (ref 3.8–10.5)
WBC # FLD AUTO: 11.88 K/UL — HIGH (ref 3.8–10.5)

## 2017-09-26 PROCEDURE — 90792 PSYCH DIAG EVAL W/MED SRVCS: CPT

## 2017-09-26 PROCEDURE — 99284 EMERGENCY DEPT VISIT MOD MDM: CPT

## 2017-09-26 RX ORDER — LAMOTRIGINE 25 MG/1
150 TABLET, ORALLY DISINTEGRATING ORAL ONCE
Qty: 0 | Refills: 0 | Status: COMPLETED | OUTPATIENT
Start: 2017-09-26 | End: 2017-09-26

## 2017-09-26 RX ORDER — QUETIAPINE FUMARATE 200 MG/1
100 TABLET, FILM COATED ORAL ONCE
Qty: 0 | Refills: 0 | Status: COMPLETED | OUTPATIENT
Start: 2017-09-26 | End: 2017-09-26

## 2017-09-26 RX ORDER — CLONAZEPAM 1 MG
0.5 TABLET ORAL ONCE
Qty: 0 | Refills: 0 | Status: DISCONTINUED | OUTPATIENT
Start: 2017-09-26 | End: 2017-09-26

## 2017-09-26 RX ADMIN — QUETIAPINE FUMARATE 100 MILLIGRAM(S): 200 TABLET, FILM COATED ORAL at 21:59

## 2017-09-26 RX ADMIN — LAMOTRIGINE 150 MILLIGRAM(S): 25 TABLET, ORALLY DISINTEGRATING ORAL at 21:59

## 2017-09-26 RX ADMIN — Medication 0.5 MILLIGRAM(S): at 21:59

## 2017-09-26 NOTE — ED BEHAVIORAL HEALTH ASSESSMENT NOTE - CASE SUMMARY
42 year old, female, history of Borderline Personality Disorder, Psychogenic Seizures, questionable Bipolar Disorder and post-traumatic stress disorder, history of suicide attempt vs gesture - superficially cut her neck with glass/did not require sutures/no scars, BIB EMS for depression/anxiety in the context of breaking up with her boyfriend.    Pt is not manic or psychotic. She denies any suicidal ideations, intent or plans at this time - admits to making suicidal statement earlier to therapist after break up with boyfriend. No acute safety concerns noted by outpatient psychiatrist who agrees that pt's presentation is best attributed to her Borderline PD and would not benefit from inpatient psychiatric hospitalization.  There is no evidence of acute risk to self or others noted at this time.

## 2017-09-26 NOTE — ED PROVIDER NOTE - MEDICAL DECISION MAKING DETAILS
This is a 42 year old Female PMHX Bipolar, Endometriosis Epilepsy HLD PTSD Seizures  BIBA for psych eval r/t suicidal thoughts. Reports recent breakup with boyfriend which is causing increased anxiety and depression Reports suicidal thoughts Denies plan. Medical evaluation performed. There is no clinical evidence of intoxication or any acute medical problem requiring immediate intervention. Final disposition will be determined by psychiatrist.

## 2017-09-26 NOTE — ED BEHAVIORAL HEALTH ASSESSMENT NOTE - HPI (INCLUDE ILLNESS QUALITY, SEVERITY, DURATION, TIMING, CONTEXT, MODIFYING FACTORS, ASSOCIATED SIGNS AND SYMPTOMS)
42 year old, single, non-caregiver, disabled, domiciled [facility] in [place] since 12/14,  female with history of Borderline Personality Disorder, Psychogenic Seizures, Bipolar Disorder and post-traumatic stress disorder, history of suicide attempt, superficially cut her neck with glass, did not require sutures, no scars, PMH, no history of substance abuse, history of trauma, no legal issues or access to firearms, BIB EMS from home after patient felt increasingly depressed and anxious.    Patient is well known to [facility] team, who has been seen in the ED setting for the past few years intermittently. Patient reports she feels she is feeling more depressed and anxious x past month, reports suicidal ideation with stated plan to cut her wrist, unable to contract for safety.  Precipitating factor seems to be that patient's boyfriend moved out of the nursing home today.  Patient has reportedly been more reliant on Ativan prn for anxiety lately and endorses depressed mood, anhedonia, feelings of hopelessness and helplessness, excessive sleep, poor energy and states she's unable to be safe in the community and expresses a lack of confidence in her outpatient psychiatrist.  There is no evidence of acute manic/hypomanic symptoms but patient is histrionic, tearful throughout ED course.  Patient with no HI, denies any hallucinations, does not report any delusional thought content, denies thought insertion/withdrawal, denies referential thought processes & is not paranoid on interview. Pt is linear, logical, organized and well related. 42 year old, single, non-caregiver, disabled, domiciled [facility] in [place] since 12/2014,  female with history of Borderline Personality Disorder, Psychogenic Seizures, questionable Bipolar Disorder and post-traumatic stress disorder, history of suicide attempt vs gesture - superficially cut her neck with glass/did not require sutures/no scars, no relevant PMH, no history of substance abuse, history of trauma, no legal issues or access to firearms, BIB EMS for depression/anxiety in the context of breaking up with her boyfriend.     Pt 42 year old, single, non-caregiver, disabled, domiciled [facility] in [place] since 12/2014,  female with history of Borderline Personality Disorder, Psychogenic Seizures, questionable Bipolar Disorder and post-traumatic stress disorder, history of suicide attempt vs gesture - superficially cut her neck with glass/did not require sutures/no scars, no relevant PMH, no history of substance abuse, history of trauma, no legal issues or access to firearms, BIB EMS for depression/anxiety in the context of breaking up with her boyfriend.     Pt resides at [facility] since 2014 but has been recently staying with her boyfriend, Lars. Pt states that this boyfriend is not a good influence, especially as he is an alcoholic. Today, pt broke up with this boyfriend and decided to move back to [facility]. Pt went to an appointment today with a new therapist, Renetta, at "[facility] and stated that she had suicidal ideations in the context of this break up. Pt states the therapist called 911 as a result of this. Pt denies any suicidal ideations, intent or plans at this time. She states she chronically has suicidal thoughts in the context of stressors, especially things like break ups. Pt notes that the fact she has a new therapist is also difficult for her as she does not do well with change/abandonment. Pt states she is still anxious about returning to her residence as she does not like the staff there and all of her belongings are at her boyfriend's house. Pt endorses compliance with her medications, Klonopin, Seroquel, and Lamictal (for seizures?). 42 year old, single, non-caregiver, disabled, domiciled [facility] in [place] since 12/2014,  female with history of Borderline Personality Disorder, Psychogenic Seizures, questionable Bipolar Disorder and post-traumatic stress disorder, history of suicide attempt vs gesture - superficially cut her neck with glass/did not require sutures/no scars, no relevant PMH, no history of substance abuse, history of trauma, no legal issues or access to firearms, BIB EMS for depression/anxiety in the context of breaking up with her boyfriend.     Pt resides at [facility] since 2014 but has been recently staying with her boyfriend, Lars. Pt states that this boyfriend is not a good influence, especially as he is an alcoholic. Today, pt broke up with this boyfriend and decided to move back to [facility]. Pt went to an appointment today with a new therapist, Renetta, at [facility], and stated that she had suicidal ideations in the context of this break up. Pt states the therapist called 911 as a result of this. Pt denies any suicidal ideations, intent or plans at this time. She states she chronically has suicidal thoughts in the context of stressors, especially things like break ups. Pt notes that the fact she has a new therapist is also difficult for her as she does not do well with change/abandonment. Pt states she is still anxious about returning to her residence as she does not like the staff there and all of her belongings are at her boyfriend's house. Pt endorses compliance with her medications, Klonopin, Seroquel, and Lamictal (for seizures?). Pt denies any s/s of judson or psychosis, denies auditory/visual hallucinations or delusions. Denies aggressive or homicidal ideations, intent or plans. Pt highly histrionic in ER, exaggerated 42 year old, single, non-caregiver, disabled, domiciled [facility] in [place] since 12/2014,  female with history of Borderline Personality Disorder, Psychogenic Seizures, questionable Bipolar Disorder and post-traumatic stress disorder, history of suicide attempt vs gesture - superficially cut her neck with glass/did not require sutures/no scars, no relevant PMH, no history of substance abuse, history of trauma, no legal issues or access to firearms, BIB EMS for depression/anxiety in the context of breaking up with her boyfriend.     Pt resides at [facility] since 2014 but has been recently staying with her boyfriend, Lars. Pt states that this boyfriend is not a good influence, especially as he is an alcoholic. Today, pt broke up with this boyfriend and decided to move back to [facility]. Pt went to an appointment today with a new therapist, [facility]etta, at [facility], and stated that she had suicidal ideations in the context of this break up. Pt states the therapist called 911 as a result of this. Pt denies any suicidal ideations, intent or plans at this time. She states she chronically has suicidal thoughts in the context of stressors, especially things like break ups. Pt notes that the fact she has a new therapist is also difficult for her as she does not do well with change/abandonment. Pt states she is still anxious about returning to her residence as she does not like the staff there and all of her belongings are at her boyfriend's house. Pt endorses compliance with her medications, Klonopin, Seroquel, and Lamictal (for seizures?). Pt denies any s/s of judson or psychosis, denies auditory/visual hallucinations or delusions. Denies aggressive or homicidal ideations, intent or plans. Pt histrionic throughout interview but able to maintain behavioral control.    [facility] - She reports that pt has a hx of Bipolar d/o and Borderline PD but agrees that the majority of her presentation is related to the Borderline PD as opposed to Bipolar d/o. She states that pt is extremely controlling and dictates her treatment at all times. Pt is not currently on a mood stabilizer or anti-depressant as per her own request but Dr. Morejon will work with her to start one of those. Dr. Morejon saw patient yesterday and pt did not have any suicidal ideations, intent or plans. No acute safety concerns at this time. Will follow up with patient as soon as possible.

## 2017-09-26 NOTE — ED PROVIDER NOTE - CARE PLAN
Principal Discharge DX:	Bipolar 1 disorder  Secondary Diagnosis:	Major depression  Secondary Diagnosis:	PTSD (post-traumatic stress disorder)

## 2017-09-26 NOTE — ED BEHAVIORAL HEALTH ASSESSMENT NOTE - OTHER
other residents demanding, attention seeking attention seeking at times but mostly cooperative Normal while in ER; chronically impaired impulse control when upset

## 2017-09-26 NOTE — ED BEHAVIORAL HEALTH ASSESSMENT NOTE - PRIMARY DX
Bipolar depression Borderline personality disorder Adjustment disorder with mixed anxiety and depressed mood

## 2017-09-26 NOTE — ED PROVIDER NOTE - OBJECTIVE STATEMENT
This is a 42 year old Female PMHX Bipolar, Endometriosis Epilepsy HLD PTSD Seizures  BIBA for psych eval r/t suicidal thoughts. Reports recent breakup with boyfriend which is causing increased anxiety and depression Reports suicidal thoughts Denies plan Denies ETOH/Drugs Denies AH/VH

## 2017-09-26 NOTE — ED ADULT TRIAGE NOTE - CHIEF COMPLAINT QUOTE
pt c/o feeling depressed, SI at times states that she was hospitalized for same a few weeks ago, not feeling better, denies SI at this time

## 2017-09-26 NOTE — ED BEHAVIORAL HEALTH ASSESSMENT NOTE - SUICIDE RISK FACTORS
Anhedonia/Unable to engage in safety planning/Hopelessness/Mood episode/Agitation/severe anxiety Highly impulsive behavior/Agitation/severe anxiety

## 2017-09-26 NOTE — ED BEHAVIORAL HEALTH ASSESSMENT NOTE - CURRENT MEDICATION
Lamictal 150 mg po BID, Effexor XR 37.5mg qd, Seroquel 50mg HS, Ativan 0.5mg PO QID PRN anxiety, Famotidine 20mg PO BID  [facility] database reviewed: Lorazepam 0.5mg dispensed #120 on 8/21/17 by Audi Morejon MD Lamictal 150 mg po BID,  Seroquel 100mg HS, Klonopin 0.5mg PO TID

## 2017-09-26 NOTE — ED BEHAVIORAL HEALTH ASSESSMENT NOTE - DETAILS
see HPI see HPI, cut neck with glass in the past oxycodone, sudafed d/w Dr. Juan Miguel Allen self referred, care coordinated with [facility] staff Dr. Allen - [phone] Low 4 08/31-09/08 Attempt vs gesture of cutting neck superficially with glass Per chart +hx of trauma - pt declined to speak about it today Outpatient team - unable to reach outpatient therapist but did discuss with outpatient psychiatrist

## 2017-09-26 NOTE — ED BEHAVIORAL HEALTH ASSESSMENT NOTE - OTHER PAST PSYCHIATRIC HISTORY (INCLUDE DETAILS REGARDING ONSET, COURSE OF ILLNESS, INPATIENT/OUTPATIENT TREATMENT)
History of Borderline Personality Disorder, Bipolar Disorder and post-traumatic stress disorder, history of suicide attempt, superficially cut her neck with glass, did not require sutures. In outpatient treatment with therapist Lianet Ji & Dr. Morejon at [facility] in [place], [place]. History of Borderline Personality Disorder, Bipolar Disorder and post-traumatic stress disorder, history of suicide attempt vs gesture - superficially cut her neck with glass, did not require sutures. In outpatient treatment with Dr. Morejon at [facility] in [place], [place] and new therapist, "Renetta"

## 2017-09-26 NOTE — ED BEHAVIORAL HEALTH ASSESSMENT NOTE - RISK ASSESSMENT
Protective factors include no violence history, medication compliance, supportive housing, no access to guns, no global insomnia, no substance abuse, willingness to seek help, no homicidal ideation. Risk factors include history of suicidal thoughts in the past/suicidal gestures with acute SI, stated plan to cut wrist and intent at this time, recent changes in medications with acute depressive symptoms with hopelessness and helplessness. While patient has risk factors and some protective factors the precipitating factor seems to be patient's boyfriend moving out of current residence today.  Patient is unable to contract for safety at this time  with acute SI and represents an acute risk to self therefore requiring inpatient psychiatric hospitalization for her safety. Protective factors include no violence history, medication compliance, supportive housing, no access to guns, no global insomnia, no substance abuse, willingness to seek help, no homicidal ideation, no suicidal ideations at this time. Risk factors include history of suicidal thoughts in the past/suicidal gestures.

## 2017-09-26 NOTE — ED BEHAVIORAL HEALTH ASSESSMENT NOTE - SUMMARY
42 year old, single, non-caregiver, disabled, domiciled [facility] in [place] since 12/14,  female with history of Borderline Personality Disorder, Psychogenic Seizures, Bipolar Disorder and post-traumatic stress disorder, history of suicide attempt, superficially cut her neck with glass, did not require sutures, no scars, PMH, no history of substance abuse, history of trauma, no legal issues or access to firearms, BIB EMS from home after patient felt increasingly depressed and anxious.  Patient reports she feels she is feeling more depressed and anxious x past month, reports suicidal ideation with stated plan to cut her wrist, unable to contract for safety.  Precipitating factor seems to be that patient's boyfriend moved out of the nursing home today.  Patient has reportedly been more reliant on Ativan prn for anxiety lately and endorses depressed mood, anhedonia, feelings of hopelessness and helplessness, excessive sleep, poor energy and states she's unable to be safe in the community and expresses a lack of confidence in her outpatient psychiatrist.  There is no evidence of acute manic/hypomanic symptoms but patient is histrionic, tearful throughout ED course.  Patient with no HI, denies any hallucinations, does not report any delusional thought content, denies thought insertion/withdrawal, denies referential thought processes & is not paranoid on interview.  Collateral obtained from patient's therapist, Ame reports that patient was noted to be "more manic" one month ago but since then has been increasingly depressed.  Patient represents an acute risk to self and to be admitted to [facility] on a 9.13, voluntary legal status.  No need for constant observation in a locked, supervised setting.  Transportation to unit accompanied by security. 42 year old, female, history of Borderline Personality Disorder, Psychogenic Seizures, questionable Bipolar Disorder and post-traumatic stress disorder, history of suicide attempt vs gesture - superficially cut her neck with glass/did not require sutures/no scars, BIB EMS for depression/anxiety in the context of breaking up with her boyfriend.    Pt is not manic or psychotic. She denies any suicidal ideations, intent or plans at this time - admits to making suicidal statement earlier to therapist after break up with boyfriend. No acute safety concerns noted by outpatient psychiatrist who agrees that pt's presentation is best attributed to her Borderline PD and would not benefit from inpatient psychiatric hospitalization. Discharge.

## 2017-09-26 NOTE — ED BEHAVIORAL HEALTH NOTE - BEHAVIORAL HEALTH NOTE
Worker alerted patient will require medicaid cab to her boyfriend's home [address] [place]. have arranged with Kuldeep love for a 11p transport. Confirmation  [id number]

## 2017-09-26 NOTE — ED BEHAVIORAL HEALTH ASSESSMENT NOTE - ORAL MEDICATION DETAILS
Ativan 0.5 mg for anxiety at 12:51pm on 8/31/17 Klonopin 0.5mg, Seroquel 100mg, Lamictal 150mg - Home HS regimen

## 2017-09-26 NOTE — ED BEHAVIORAL HEALTH ASSESSMENT NOTE - SUICIDE PROTECTIVE FACTORS
Supportive social network or family/Fear of death or dying due to pain/suffering Future oriented/Supportive social network or family/Fear of death or dying due to pain/suffering/Positive therapeutic relationships/Identifies reasons for living

## 2017-09-26 NOTE — ED BEHAVIORAL HEALTH ASSESSMENT NOTE - DESCRIPTION
Nonepileptic seizures see HPI Patient is visibly upset, tearful, histrionic, inconsolable during ED course, is provided Ativan 0.5mg PO x 1 for anxiety with only minimal benefit. Tearful/histrionic at times, maintained behavioral control. Tearful/histrionic at times, maintained behavioral control.    Vital Signs Last 24 Hrs  T(C): --  T(F): --  HR: 79 (26 Sep 2017 16:43) (79 - 79)  BP: 107/77 (26 Sep 2017 16:43) (107/77 - 107/77)  BP(mean): --  RR: 16 (26 Sep 2017 16:43) (16 - 16)  SpO2: 100% (26 Sep 2017 16:43) (100% - 100%)

## 2017-09-27 ENCOUNTER — EMERGENCY (EMERGENCY)
Facility: HOSPITAL | Age: 42
LOS: 1 days | Discharge: HOME | End: 2017-09-27
Admitting: EMERGENCY MEDICINE
Payer: COMMERCIAL

## 2017-09-27 VITALS
HEART RATE: 78 BPM | TEMPERATURE: 98 F | RESPIRATION RATE: 18 BRPM | OXYGEN SATURATION: 99 % | SYSTOLIC BLOOD PRESSURE: 114 MMHG | DIASTOLIC BLOOD PRESSURE: 73 MMHG

## 2017-09-27 LAB
HCT VFR BLD CALC: 39.9 % — SIGNIFICANT CHANGE UP (ref 34.5–45)
HGB BLD-MCNC: 13.3 G/DL — SIGNIFICANT CHANGE UP (ref 11.5–15.5)
MCHC RBC-ENTMCNC: 30.4 PG — SIGNIFICANT CHANGE UP (ref 27–34)
MCHC RBC-ENTMCNC: 33.3 % — SIGNIFICANT CHANGE UP (ref 32–36)
MCV RBC AUTO: 91.3 FL — SIGNIFICANT CHANGE UP (ref 80–100)
NRBC # FLD: 0 — SIGNIFICANT CHANGE UP
PLATELET # BLD AUTO: 254 K/UL — SIGNIFICANT CHANGE UP (ref 150–400)
PMV BLD: 9.8 FL — SIGNIFICANT CHANGE UP (ref 7–13)
RBC # BLD: 4.37 M/UL — SIGNIFICANT CHANGE UP (ref 3.8–5.2)
RBC # FLD: 12.4 % — SIGNIFICANT CHANGE UP (ref 10.3–14.5)
WBC # BLD: 9.23 K/UL — SIGNIFICANT CHANGE UP (ref 3.8–10.5)
WBC # FLD AUTO: 9.23 K/UL — SIGNIFICANT CHANGE UP (ref 3.8–10.5)

## 2017-09-27 PROCEDURE — 90792 PSYCH DIAG EVAL W/MED SRVCS: CPT

## 2017-09-27 PROCEDURE — 99284 EMERGENCY DEPT VISIT MOD MDM: CPT | Mod: GC

## 2017-09-27 NOTE — ED BEHAVIORAL HEALTH ASSESSMENT NOTE - DESCRIPTION
Tearful/histrionic at times, maintained behavioral control.    ICU Vital Signs Last 24 Hrs  T(C): 36.9 (27 Sep 2017 14:42), Max: 36.9 (27 Sep 2017 14:42)  T(F): 98.5 (27 Sep 2017 14:42), Max: 98.5 (27 Sep 2017 14:42)  HR: 78 (27 Sep 2017 14:42) (78 - 78)  BP: 114/73 (27 Sep 2017 14:42) (114/73 - 114/73)  BP(mean): --  ABP: --  ABP(mean): --  RR: 18 (27 Sep 2017 14:42) (18 - 18)  SpO2: 99% (27 Sep 2017 14:42) (99% - 99%) Nonepileptic seizures see HPI

## 2017-09-27 NOTE — ED PROVIDER NOTE - OBJECTIVE STATEMENT
The patient is a 42y Female hx of depression, bipolar presents to ed for psych eval reporting SI w/o active plan.  Pt denies self inflicted injuries, trauma or falls, no headache, back or neck pain, no abd/flank pain, no uti/urinary symptoms, nausea or vomiting, no fever or chills, no cp or sob, no palpitations or diaphoresis. Denies etoh or illicit drugs, no HI, no AVH.

## 2017-09-27 NOTE — ED BEHAVIORAL HEALTH ASSESSMENT NOTE - HPI (INCLUDE ILLNESS QUALITY, SEVERITY, DURATION, TIMING, CONTEXT, MODIFYING FACTORS, ASSOCIATED SIGNS AND SYMPTOMS)
42 year old, single, non-caregiver, disabled, domiciled [facility] in [place] since 12/2014,  female with history of Borderline Personality Disorder, Psychogenic Seizures, questionable Bipolar Disorder and post-traumatic stress disorder, history of suicide attempt vs gesture - superficially cut her neck with glass/did not require sutures/no scars, no relevant PMH, no history of substance abuse, history of trauma, no legal issues or access to firearms, BIB EMS for depression/anxiety in the context of breaking up with her boyfriend.     Pt resides at [facility] since 2014 but has been recently staying with her boyfriend, Lars. Pt states that this boyfriend is not a good influence, especially as he is an alcoholic. Yesterday, pt broke up with this boyfriend and decided to move back to [facility]. Pt went to an appointment yesterday with a new therapist, Renetta, at [facility], and stated that she had suicidal ideations in the context of this break up. Pt states the therapist called 911 as a result of this. Pt denied any suicidal ideations, intent or plans at this time. She stated she chronically has suicidal thoughts in the context of stressors, especially things like break ups. Pt noted that the fact she has a new therapist is also difficult for her as she does not do well with change/abandonment. Pt stated she is still anxious about returning to her residence as she does not like the staff there and all of her belongings are at her boyfriend's house.     Today, patient called her therapist's office and report SI and they told her to call 911 to be evaluated.  Pt endorses compliance with her medications, Klonopin, Seroquel, and Lamictal (for seizures?). Pt denies any s/s of judson or psychosis, denies auditory/visual hallucinations or delusions. Denies aggressive or homicidal ideations, intent or plans. Pt histrionic throughout interview but able to maintain behavioral control.    [facility] - She reported that pt has a hx of Bipolar d/o and Borderline PD but agrees that the majority of her presentation is related to the Borderline PD as opposed to Bipolar d/o. She stated that pt is extremely controlling and dictates her treatment at all times. Pt is not currently on a mood stabilizer or anti-depressant as per her own request but Dr. Morejon will work with her to start one of those.

## 2017-09-27 NOTE — ED PROVIDER NOTE - MEDICAL DECISION MAKING DETAILS
The patient is a 42y Female hx of depression, bipolar presents to ed for psych eval reporting SI w/o active plan.  Pt is well appearing w/o visible signs of physical injuries on exam, alert and oriented, articulate speech, head NCAT, no C-spine tend, ambulating w/ steady gait, lungs are clear bilat, cor rrr, abd sft, nt, nd, nr, no guarding, ext no edema.  psych consult and dispo per psych-

## 2017-09-27 NOTE — ED BEHAVIORAL HEALTH ASSESSMENT NOTE - SUICIDE PROTECTIVE FACTORS
Future oriented/Positive therapeutic relationships/Supportive social network or family/Fear of death or dying due to pain/suffering/Identifies reasons for living

## 2017-09-27 NOTE — ED BEHAVIORAL HEALTH ASSESSMENT NOTE - OTHER PAST PSYCHIATRIC HISTORY (INCLUDE DETAILS REGARDING ONSET, COURSE OF ILLNESS, INPATIENT/OUTPATIENT TREATMENT)
History of Borderline Personality Disorder, Bipolar Disorder and post-traumatic stress disorder, history of suicide attempt vs gesture - superficially cut her neck with glass, did not require sutures. In outpatient treatment with Dr. Morejon at [facility] in [place], [place] and new therapist, "Renetta"

## 2017-09-27 NOTE — ED ADULT TRIAGE NOTE - CHIEF COMPLAINT QUOTE
pt was seen at [facility] yesterday for depression and SI. patient states she has a plan but will not tell triage RN. pt was released from [facility] yesterday. pt is calm in triage.    Verbal Report to ALVARADO quintero  pt to JOLANTA

## 2017-09-27 NOTE — ED BEHAVIORAL HEALTH ASSESSMENT NOTE - OTHER
Normal while in ER; chronically impaired impulse control when upset other residents attention seeking at times but mostly cooperative

## 2017-09-27 NOTE — ED BEHAVIORAL HEALTH ASSESSMENT NOTE - PROFESSIONAL COLLATERAL PHONE
BMI Within normal limits, continue current management. [phone] (on-call [phone])

## 2017-09-27 NOTE — ED BEHAVIORAL HEALTH NOTE - BEHAVIORAL HEALTH NOTE
Writer arranged for patient to be transported home via a taxi paid for by [employer] #[phone]. Writer arranged for patient to be transported home via a taxi [employer] paid for by [facility] #[phone] auth [id number]

## 2017-09-27 NOTE — ED BEHAVIORAL HEALTH ASSESSMENT NOTE - SUMMARY
42 year old, female, history of Borderline Personality Disorder, Psychogenic Seizures, questionable Bipolar Disorder and post-traumatic stress disorder, history of suicide attempt vs gesture - superficially cut her neck with glass/did not require sutures/no scars, BIB EMS for depression/anxiety in the context of breaking up with her boyfriend.    Pt is not manic or psychotic. She denies any suicidal ideations, intent or plans at this time - admits to making suicidal statement earlier to therapist over phone. No acute safety concerns. Pt's presentation is best attributed to her Borderline PD and would not benefit from inpatient psychiatric hospitalization. Discharge.

## 2017-09-27 NOTE — ED PROVIDER NOTE - CHPI ED SYMPTOMS NEG
no agitation/no paranoia/no weakness/no suicidal/no confusion/no homicidal/no weight loss/no change in level of consciousness/no hallucinations/no disorientation

## 2017-09-27 NOTE — ED BEHAVIORAL HEALTH ASSESSMENT NOTE - RISK ASSESSMENT
Protective factors include no violence history, medication compliance, supportive housing, no access to guns, no global insomnia, no substance abuse, willingness to seek help, no homicidal ideation, no suicidal ideations at this time. Risk factors include history of suicidal thoughts in the past/suicidal gestures.

## 2017-09-27 NOTE — ED BEHAVIORAL HEALTH ASSESSMENT NOTE - DETAILS
Low 4 08/31-09/08 oxycodone, sudafed Per chart +hx of trauma - pt declined to speak about it today Outpatient team - unable to reach outpatient therapist but left message Dr. Allen - [phone] Attempt vs gesture of cutting neck superficially with glass

## 2017-09-28 ENCOUNTER — TRANSCRIPTION ENCOUNTER (OUTPATIENT)
Age: 42
End: 2017-09-28

## 2017-11-09 ENCOUNTER — APPOINTMENT (OUTPATIENT)
Dept: INTERNAL MEDICINE | Facility: CLINIC | Age: 42
End: 2017-11-09
Payer: MEDICARE

## 2017-11-09 VITALS
HEART RATE: 96 BPM | WEIGHT: 152 LBS | SYSTOLIC BLOOD PRESSURE: 104 MMHG | BODY MASS INDEX: 23.86 KG/M2 | OXYGEN SATURATION: 98 % | HEIGHT: 67 IN | DIASTOLIC BLOOD PRESSURE: 70 MMHG | TEMPERATURE: 98.5 F | RESPIRATION RATE: 18 BRPM

## 2017-11-09 DIAGNOSIS — Z80.51 FAMILY HISTORY OF MALIGNANT NEOPLASM OF KIDNEY: ICD-10-CM

## 2017-11-09 DIAGNOSIS — Z78.9 OTHER SPECIFIED HEALTH STATUS: ICD-10-CM

## 2017-11-09 DIAGNOSIS — Z80.0 FAMILY HISTORY OF MALIGNANT NEOPLASM OF DIGESTIVE ORGANS: ICD-10-CM

## 2017-11-09 DIAGNOSIS — F15.90 OTHER STIMULANT USE, UNSPECIFIED, UNCOMPLICATED: ICD-10-CM

## 2017-11-09 PROCEDURE — 99213 OFFICE O/P EST LOW 20 MIN: CPT

## 2017-12-18 ENCOUNTER — MEDICATION RENEWAL (OUTPATIENT)
Age: 42
End: 2017-12-18

## 2018-02-01 ENCOUNTER — RX RENEWAL (OUTPATIENT)
Age: 43
End: 2018-02-01

## 2018-02-06 ENCOUNTER — APPOINTMENT (OUTPATIENT)
Dept: INTERNAL MEDICINE | Facility: CLINIC | Age: 43
End: 2018-02-06
Payer: MEDICARE

## 2018-02-06 VITALS
OXYGEN SATURATION: 98 % | TEMPERATURE: 97.5 F | SYSTOLIC BLOOD PRESSURE: 102 MMHG | HEIGHT: 67 IN | RESPIRATION RATE: 18 BRPM | WEIGHT: 160 LBS | HEART RATE: 73 BPM | DIASTOLIC BLOOD PRESSURE: 78 MMHG | BODY MASS INDEX: 25.11 KG/M2

## 2018-02-06 PROCEDURE — 99215 OFFICE O/P EST HI 40 MIN: CPT

## 2018-02-12 ENCOUNTER — APPOINTMENT (OUTPATIENT)
Dept: INTERNAL MEDICINE | Facility: CLINIC | Age: 43
End: 2018-02-12
Payer: MEDICAID

## 2018-02-12 VITALS
TEMPERATURE: 97.5 F | OXYGEN SATURATION: 98 % | RESPIRATION RATE: 18 BRPM | DIASTOLIC BLOOD PRESSURE: 64 MMHG | WEIGHT: 161 LBS | SYSTOLIC BLOOD PRESSURE: 94 MMHG | HEIGHT: 67 IN | BODY MASS INDEX: 25.27 KG/M2 | HEART RATE: 99 BPM

## 2018-02-12 DIAGNOSIS — G40.409 OTHER GENERALIZED EPILEPSY AND EPILEPTIC SYNDROMES, NOT INTRACTABLE, W/OUT STATUS EPILEPTICUS: ICD-10-CM

## 2018-02-12 DIAGNOSIS — J06.9 ACUTE UPPER RESPIRATORY INFECTION, UNSPECIFIED: ICD-10-CM

## 2018-02-12 PROCEDURE — 99215 OFFICE O/P EST HI 40 MIN: CPT | Mod: 25

## 2018-02-12 PROCEDURE — 90471 IMMUNIZATION ADMIN: CPT | Mod: GY

## 2018-02-12 PROCEDURE — 36415 COLL VENOUS BLD VENIPUNCTURE: CPT

## 2018-02-12 PROCEDURE — G0438: CPT

## 2018-02-12 PROCEDURE — 90715 TDAP VACCINE 7 YRS/> IM: CPT | Mod: GY

## 2018-02-16 LAB
25(OH)D3 SERPL-MCNC: 19.6 NG/ML
ALBUMIN SERPL ELPH-MCNC: 4.4 G/DL
ALP BLD-CCNC: 93 U/L
ALT SERPL-CCNC: 15 U/L
ANION GAP SERPL CALC-SCNC: 17 MMOL/L
AST SERPL-CCNC: 21 U/L
BASOPHILS # BLD AUTO: 0.02 K/UL
BASOPHILS NFR BLD AUTO: 0.2 %
BILIRUB SERPL-MCNC: 0.2 MG/DL
BUN SERPL-MCNC: 14 MG/DL
CALCIUM SERPL-MCNC: 10.8 MG/DL
CHLORIDE SERPL-SCNC: 98 MMOL/L
CHOLEST SERPL-MCNC: 289 MG/DL
CHOLEST/HDLC SERPL: 3.6 RATIO
CO2 SERPL-SCNC: 24 MMOL/L
CREAT SERPL-MCNC: 0.96 MG/DL
EOSINOPHIL # BLD AUTO: 0.03 K/UL
EOSINOPHIL NFR BLD AUTO: 0.3 %
GLUCOSE SERPL-MCNC: 103 MG/DL
HBA1C MFR BLD HPLC: 5.4 %
HCT VFR BLD CALC: 43.1 %
HDLC SERPL-MCNC: 80 MG/DL
HGB BLD-MCNC: 13.9 G/DL
HIV1+2 AB SPEC QL IA.RAPID: NONREACTIVE
IMM GRANULOCYTES NFR BLD AUTO: 0.3 %
LDLC SERPL CALC-MCNC: 173 MG/DL
LYMPHOCYTES # BLD AUTO: 2.9 K/UL
LYMPHOCYTES NFR BLD AUTO: 29.3 %
MAN DIFF?: NORMAL
MCHC RBC-ENTMCNC: 30.5 PG
MCHC RBC-ENTMCNC: 32.3 GM/DL
MCV RBC AUTO: 94.5 FL
MONOCYTES # BLD AUTO: 0.66 K/UL
MONOCYTES NFR BLD AUTO: 6.7 %
NEUTROPHILS # BLD AUTO: 6.26 K/UL
NEUTROPHILS NFR BLD AUTO: 63.2 %
PLATELET # BLD AUTO: 322 K/UL
POTASSIUM SERPL-SCNC: 5 MMOL/L
PROT SERPL-MCNC: 7.3 G/DL
RBC # BLD: 4.56 M/UL
RBC # FLD: 13.4 %
SODIUM SERPL-SCNC: 139 MMOL/L
TRIGL SERPL-MCNC: 178 MG/DL
TSH SERPL-ACNC: 1.99 UIU/ML
WBC # FLD AUTO: 9.9 K/UL

## 2018-02-21 ENCOUNTER — RESULT REVIEW (OUTPATIENT)
Age: 43
End: 2018-02-21

## 2018-04-12 ENCOUNTER — RX RENEWAL (OUTPATIENT)
Age: 43
End: 2018-04-12

## 2018-07-23 PROBLEM — Z80.0 FAMILY HISTORY OF COLON CANCER: Status: ACTIVE | Noted: 2017-11-09

## 2018-09-01 ENCOUNTER — APPOINTMENT (OUTPATIENT)
Dept: INTERNAL MEDICINE | Facility: CLINIC | Age: 43
End: 2018-09-01
Payer: MEDICARE

## 2018-09-01 VITALS
TEMPERATURE: 97.2 F | DIASTOLIC BLOOD PRESSURE: 70 MMHG | WEIGHT: 163 LBS | HEIGHT: 67 IN | SYSTOLIC BLOOD PRESSURE: 100 MMHG | BODY MASS INDEX: 25.58 KG/M2 | RESPIRATION RATE: 16 BRPM | OXYGEN SATURATION: 98 % | HEART RATE: 105 BPM

## 2018-09-01 DIAGNOSIS — F32.1 MAJOR DEPRESSIVE DISORDER, SINGLE EPISODE, MODERATE: ICD-10-CM

## 2018-09-01 PROCEDURE — 99213 OFFICE O/P EST LOW 20 MIN: CPT

## 2018-09-01 NOTE — PHYSICAL EXAM
[No Acute Distress] : no acute distress [Well Nourished] : well nourished [Well Developed] : well developed [Well-Appearing] : well-appearing [No Respiratory Distress] : no respiratory distress  [Normal Rate] : normal rate  [No Edema] : there was no peripheral edema [No CVA Tenderness] : no CVA  tenderness [No Joint Swelling] : no joint swelling [No Rash] : no rash [No Skin Lesions] : no skin lesions [Normal Gait] : normal gait [Alert and Oriented x3] : oriented to person, place, and time [de-identified] : + Crying,  [de-identified] : right hand/ ring finger slight redness at nailbed, no swelling, min tenderness, no warmth  [de-identified] : mood is sad and anxious

## 2018-09-01 NOTE — HISTORY OF PRESENT ILLNESS
[FreeTextEntry8] : 43 yr old female presents today with complaint of right hand ring finger afraid it might be infected. Pt admits to habit of picking at her cuticles/ fingers/ nails. Pt is tearful and depressed. Lives in  Adult Home , has Psychiatrist and Therapist she sees on regular basis. Reports having all her medication needed at this time. Reports soaking with Epsom salt as directed yesterday via telephone call from this Provider via telephone call. Pt did mention she called her Therapist today and was waiting call back.

## 2018-09-01 NOTE — COUNSELING
[Behavioral health counseling provided] : behavioral health  [Sleep ___ hours/day] : Sleep [unfilled] hours/day [Engage in a relaxing activity] : Engage in a relaxing activity [Plan in advance] : Plan in advance [Other: ____] : [unfilled] [Good understanding] : Patient has a good understanding of lifestyle changes and the steps needed to achieve self management goals [de-identified] : Discussed other techniques of aversion to avoid skin picking, Pt agreed to try

## 2018-09-01 NOTE — REVIEW OF SYSTEMS
[Negative] : Heme/Lymph [Anxiety] : anxiety [Depression] : depression [de-identified] : slight discomfort to affected finger

## 2018-09-01 NOTE — ASSESSMENT
[FreeTextEntry1] : 43 yr old presents sick call\par #1 Nailbed Injury- min irritation to finger, continue Epsom salt soaking 2-3 x daily 15 min . Try other things to do with hands when anxious such as stress ball and or finger twirling string etc\par #2 Anxiety- Needs better control, F/ U Psychiatry/ counseling , medication\par #3 Depression- ? Controlled, F/U Psych/ Counseling, meds\par Pt lives in Supervised setting Adult home in area , F/U Dr. Morin as previously discussed

## 2018-09-01 NOTE — HEALTH RISK ASSESSMENT
[No falls in past year] : Patient reported no falls in the past year [3] : 2) Feeling down, depressed, or hopeless for nearly every day (3) [] : No [de-identified] : none [de-identified] : Psychistrist [[id number]] : 6

## 2018-11-12 ENCOUNTER — APPOINTMENT (OUTPATIENT)
Dept: INTERNAL MEDICINE | Facility: CLINIC | Age: 43
End: 2018-11-12
Payer: MEDICARE

## 2018-11-12 ENCOUNTER — RX RENEWAL (OUTPATIENT)
Age: 43
End: 2018-11-12

## 2018-11-12 VITALS
TEMPERATURE: 98.2 F | HEART RATE: 105 BPM | DIASTOLIC BLOOD PRESSURE: 84 MMHG | WEIGHT: 177 LBS | BODY MASS INDEX: 27.78 KG/M2 | HEIGHT: 67 IN | SYSTOLIC BLOOD PRESSURE: 120 MMHG | OXYGEN SATURATION: 98 % | RESPIRATION RATE: 16 BRPM

## 2018-11-12 DIAGNOSIS — M25.552 PAIN IN LEFT HIP: ICD-10-CM

## 2018-11-12 PROCEDURE — 99213 OFFICE O/P EST LOW 20 MIN: CPT

## 2018-11-12 NOTE — PHYSICAL EXAM
[No Acute Distress] : no acute distress [Well Nourished] : well nourished [Well Developed] : well developed [Well-Appearing] : well-appearing [Normal Outer Ear/Nose] : the outer ears and nose were normal in appearance [No Respiratory Distress] : no respiratory distress  [Clear to Auscultation] : lungs were clear to auscultation bilaterally [No Accessory Muscle Use] : no accessory muscle use [Normal Rate] : normal rate  [Regular Rhythm] : with a regular rhythm [Normal S1, S2] : normal S1 and S2

## 2018-11-12 NOTE — HEALTH RISK ASSESSMENT
[No falls in past year] : Patient reported no falls in the past year [0] : 1) Little interest or pleasure doing things: Not at all (0) [1] : 2) Feeling down, depressed, or hopeless for several days (1) [] : No [[id number]] : 1

## 2018-11-12 NOTE — ASSESSMENT
[FreeTextEntry1] : 43 y.o. woman with multiple medical comorbidities now now with left hip pain on ambulation

## 2018-11-12 NOTE — HISTORY OF PRESENT ILLNESS
[FreeTextEntry8] : Patient presents for evaluation of left hip pain of several years in duration. Patient reports pain is 6/10, worst with ambulation. No other complaints at present.

## 2018-11-12 NOTE — PLAN
[FreeTextEntry1] : - Pain is likely secondary to likely chronic gait abnormality\par - Tylenol as need for pain.

## 2019-01-22 ENCOUNTER — APPOINTMENT (OUTPATIENT)
Dept: INTERNAL MEDICINE | Facility: CLINIC | Age: 44
End: 2019-01-22
Payer: MEDICARE

## 2019-01-22 VITALS
OXYGEN SATURATION: 98 % | DIASTOLIC BLOOD PRESSURE: 84 MMHG | HEIGHT: 67 IN | TEMPERATURE: 98.3 F | RESPIRATION RATE: 16 BRPM | BODY MASS INDEX: 26.68 KG/M2 | WEIGHT: 170 LBS | HEART RATE: 105 BPM | SYSTOLIC BLOOD PRESSURE: 126 MMHG

## 2019-01-22 DIAGNOSIS — F41.9 ANXIETY DISORDER, UNSPECIFIED: ICD-10-CM

## 2019-01-22 DIAGNOSIS — T78.2XXA ANAPHYLACTIC SHOCK, UNSPECIFIED, INITIAL ENCOUNTER: ICD-10-CM

## 2019-01-22 PROCEDURE — 94010 BREATHING CAPACITY TEST: CPT

## 2019-01-22 PROCEDURE — 99214 OFFICE O/P EST MOD 30 MIN: CPT | Mod: 25

## 2019-01-22 RX ORDER — OSELTAMIVIR PHOSPHATE 75 MG/1
75 CAPSULE ORAL TWICE DAILY
Qty: 10 | Refills: 0 | Status: DISCONTINUED | COMMUNITY
Start: 2018-11-16 | End: 2019-01-22

## 2019-01-22 RX ORDER — VENLAFAXINE HYDROCHLORIDE 75 MG/1
75 CAPSULE, EXTENDED RELEASE ORAL
Refills: 0 | Status: DISCONTINUED | COMMUNITY
Start: 2017-11-06 | End: 2019-01-22

## 2019-01-22 RX ORDER — CLONAZEPAM 0.5 MG/1
0.5 TABLET ORAL 3 TIMES DAILY
Qty: 90 | Refills: 0 | Status: DISCONTINUED | COMMUNITY
End: 2019-01-22

## 2019-01-22 RX ORDER — EPINEPHRINE 0.3 MG/.3ML
0.3 INJECTION INTRAMUSCULAR
Qty: 1 | Refills: 3 | Status: ACTIVE | COMMUNITY
Start: 2019-01-22 | End: 1900-01-01

## 2019-01-23 NOTE — PHYSICAL EXAM
[No Acute Distress] : no acute distress [Well Nourished] : well nourished [Normal Outer Ear/Nose] : the outer ears and nose were normal in appearance [Normal Oropharynx] : the oropharynx was normal [No JVD] : no jugular venous distention [No Respiratory Distress] : no respiratory distress  [Normal Rate] : normal rate  [No Edema] : there was no peripheral edema [No CVA Tenderness] : no CVA  tenderness [No Joint Swelling] : no joint swelling [No Rash] : no rash [Normal Gait] : normal gait [Speech Grossly Normal] : speech grossly normal [Memory Grossly Normal] : memory grossly normal [Alert and Oriented x3] : oriented to person, place, and time [Well Developed] : well developed [Well-Appearing] : well-appearing [Clear to Auscultation] : lungs were clear to auscultation bilaterally [de-identified] : Large tonsils , no puss and/ or exudate  [de-identified] : Tachycardia  [de-identified] : Allen cervical nodes enlarged  [de-identified] : talking very fast, very anxious , Denies suic/ homicidal thoughts

## 2019-01-23 NOTE — COUNSELING
[Behavioral health counseling provided] : behavioral health  [Sleep ___ hours/day] : Sleep [unfilled] hours/day [Engage in a relaxing activity] : Engage in a relaxing activity [Plan in advance] : Plan in advance [Other: ____] : [unfilled] [Good understanding] : Patient has a good understanding of lifestyle changes and the steps needed to achieve self management goals [de-identified] : Encouraged strongly to F/U Psychiatrist for medication eval. Reports she has a Therapist she sees on weekly basis . Denies suic/ homicidal thoughts at this time. Lives with her boyfriend and his sister at this time. Talks about her Mother and Father involved in her life.

## 2019-01-23 NOTE — ASSESSMENT
[FreeTextEntry1] : 43 yr old presents sick visit\par #1 URI- Start  Augmentin x 10 days, , Increase in fluids, \par #2 Cough- Robitussin for Cough \par #3 Anxiety/ Bipolar- Encouraged strongly F/U Appt Psychiatry ASAP , will see Therapist tomorrow \par #4  H/O Anaphalaxis- Reordered Epi- pen \par Call/ return if symptoms do not improve and/ or worsen\par Otherwise F/U PCP as directed

## 2019-01-23 NOTE — HEALTH RISK ASSESSMENT
[No falls in past year] : Patient reported no falls in the past year [0] : 2) Feeling down, depressed, or hopeless: Not at all (0) [] : No [de-identified] : none [de-identified] : none [[id number]] : 0

## 2019-01-23 NOTE — REVIEW OF SYSTEMS
[Cough] : cough [Insomnia] : insomnia [Anxiety] : anxiety [Negative] : Heme/Lymph [Fatigue] : fatigue [Earache] : earache [Hoarseness] : hoarseness [Nasal Discharge] : nasal discharge [Sore Throat] : sore throat [Postnasal Drip] : postnasal drip [de-identified] : t

## 2019-03-14 ENCOUNTER — EMERGENCY (EMERGENCY)
Facility: HOSPITAL | Age: 44
LOS: 1 days | Discharge: HOME | End: 2019-03-14
Admitting: EMERGENCY MEDICINE
Payer: MEDICARE

## 2019-03-14 VITALS
HEART RATE: 90 BPM | SYSTOLIC BLOOD PRESSURE: 130 MMHG | OXYGEN SATURATION: 98 % | TEMPERATURE: 98 F | DIASTOLIC BLOOD PRESSURE: 62 MMHG | RESPIRATION RATE: 18 BRPM

## 2019-03-14 DIAGNOSIS — F39 UNSPECIFIED MOOD [AFFECTIVE] DISORDER: ICD-10-CM

## 2019-03-14 DIAGNOSIS — F60.3 BORDERLINE PERSONALITY DISORDER: ICD-10-CM

## 2019-03-14 PROCEDURE — 90792 PSYCH DIAG EVAL W/MED SRVCS: CPT | Mod: GC

## 2019-03-14 PROCEDURE — 99283 EMERGENCY DEPT VISIT LOW MDM: CPT

## 2019-03-14 RX ORDER — LAMOTRIGINE 25 MG/1
150 TABLET, ORALLY DISINTEGRATING ORAL ONCE
Qty: 0 | Refills: 0 | Status: COMPLETED | OUTPATIENT
Start: 2019-03-14 | End: 2019-03-14

## 2019-03-14 RX ADMIN — Medication 2 MILLIGRAM(S): at 17:45

## 2019-03-14 RX ADMIN — LAMOTRIGINE 150 MILLIGRAM(S): 25 TABLET, ORALLY DISINTEGRATING ORAL at 19:48

## 2019-03-14 NOTE — ED BEHAVIORAL HEALTH ASSESSMENT NOTE - CASE SUMMARY
43F, single, non-caregiver, disabled, domiciled [facility] in [place] since 12/2014, with history of Borderline Personality Disorder, Psychogenic Seizures, questionable Bipolar Disorder and post-traumatic stress disorder, history of suicide attempt vs gesture - superficially cut her neck with glass/did not require sutures/no scars in 2014, no relevant PMH, no history of substance abuse, history of trauma, no legal issues or access to firearms, BIB EMS for depression and SI the context of breaking up with her boyfriend. Patient was histionic and dramatic in presentation. She initially denies SI, but when provider came she became shaky, crying without tears. She endorses a chronic hx of SI in response to rejection from the long term boyfriend, and states that she has had intermittent SI for past month, occasionally has thought of breaking a glass and cutting neck, but denies any immediate plan to do this or active intent. Pt is requesting that she be put back on Effexor, and agrees to go to Crisis Center as bridging appnt as she cannot get an appointment to see a psychiatrist at [facility] for 2 weeks. Chronic elevated background risk given risk factors include history of suicidal thoughts in the past/suicidal gestures, multiple hospitalizations poor social supports and BPD. Low imminent risk of self harm and patient is not currently an acute danger to self or others, and she does not meet criteria for inpatient psychiatric admission at this time.

## 2019-03-14 NOTE — ED BEHAVIORAL HEALTH ASSESSMENT NOTE - SUMMARY
42 year old, female, history of Borderline Personality Disorder, Psychogenic Seizures, questionable Bipolar Disorder and post-traumatic stress disorder, history of suicide attempt vs gesture - superficially cut her neck with glass/did not require sutures/no scars, BIB EMS for depression/anxiety in the context of breaking up with her boyfriend.    Pt is not manic or psychotic. She denies any suicidal ideations, intent or plans at this time - admits to making suicidal statement earlier to therapist over phone. No acute safety concerns. Pt's presentation is best attributed to her Borderline PD and would not benefit from inpatient psychiatric hospitalization. Discharge. 43F, single, non-caregiver, disabled, domiciled [facility] in [place] since 12/2014, with history of Borderline Personality Disorder, Psychogenic Seizures, questionable Bipolar Disorder and post-traumatic stress disorder, history of suicide attempt vs gesture - superficially cut her neck with glass/did not require sutures/no scars in 2014, no relevant PMH, no history of substance abuse, history of trauma, no legal issues or access to firearms, BIB EMS for depression and SI the context of breaking up with her boyfriend.     Pt denies active SI at this time. Denies manic and psychotic sx. Her anxiety and crying are similar to prior ED visits where she only presents this way when a provider is around her and these sx are more attributable to  BPD. Pt would not benefit from hospitalization. Pt agreed to follow up with crisis center tomorrow to see an outpatient provider as soon as she can. 43F, single, non-caregiver, disabled, domiciled [facility] in [place] since 12/2014, with history of Borderline Personality Disorder, Psychogenic Seizures, questionable Bipolar Disorder and post-traumatic stress disorder, history of suicide attempt vs gesture - superficially cut her neck with glass/did not require sutures/no scars in 2014, no relevant PMH, no history of substance abuse, history of trauma, no legal issues or access to firearms, BIB EMS for depression and SI the context of breaking up with her boyfriend.     Pt denies active SI at this time. She denies intent or plan. Denies manic and psychotic sx. Her anxiety and crying are similar to prior ED visits where she only presents this way when a provider is around her and these sx are more attributable to  BPD. Given lack of immediate intent or plan, and give diagnosis of borderline personality disorder, there is evidence that inpatient hospitalization may reinforce maladaptive coping and be counter therapeutic. Pt agreed to follow up with crisis center tomorrow to see an outpatient provider as soon as she can.

## 2019-03-14 NOTE — ED BEHAVIORAL HEALTH ASSESSMENT NOTE - NS ED [facility] ED COURSE PSYCHIATRIC MEDICATION GIVEN
Oral Medication (indication, name, dose, time) Voluntary Intramuscular (indication, name, dose, time)/Oral Medication (indication, name, dose, time)

## 2019-03-14 NOTE — ED PROVIDER NOTE - CLINICAL SUMMARY MEDICAL DECISION MAKING FREE TEXT BOX
44 y/o female presents to  for anxiety and SI. 44 y/o female presents to  for anxiety and SI.  Physical examination performed.  No clinical evidence of intoxication or any acute medical issues/problems requiring immediate interventions.  Psychiatric consultation requested and patient cleared for safe discharge.

## 2019-03-14 NOTE — ED BEHAVIORAL HEALTH ASSESSMENT NOTE - PRIMARY DX
Adjustment disorder with mixed anxiety and depressed mood Mood disorder Borderline personality disorder

## 2019-03-14 NOTE — ED BEHAVIORAL HEALTH ASSESSMENT NOTE - OTHER PAST PSYCHIATRIC HISTORY (INCLUDE DETAILS REGARDING ONSET, COURSE OF ILLNESS, INPATIENT/OUTPATIENT TREATMENT)
History of Borderline Personality Disorder, Bipolar Disorder and post-traumatic stress disorder, history of suicide attempt vs gesture - superficially cut her neck with glass, did not require sutures. In outpatient treatment with Dr. Morejon at [facility] in [place] and new therapist, "Renetta" 3 hospitalizations at [facility] last in 9/2017  History of Borderline Personality Disorder, Bipolar Disorder and post-traumatic stress disorder, history of suicide attempt vs gesture - superficially cut her neck with glass, did not require sutures.  Saw a therapist at [facility] for intake this week

## 2019-03-14 NOTE — ED PROVIDER NOTE - OBJECTIVE STATEMENT
44 y/o female presents to  for anxiety and SI.  PT with no other c/o pain or discomfort.  Pt is alert and oriented x 3, denies HI/AH/VH.  Pt is calm and cooperative in  area.

## 2019-03-14 NOTE — ED BEHAVIORAL HEALTH NOTE - BEHAVIORAL HEALTH NOTE
Worker called Rosa ([phone]) and spoke to Endy to arrange for transport back to patient's residence of [address] [place] [address]. Patient is cleared psychiatrically and medically for discharge. [facility] ([phone]) will  patient ar 8:40pm.  As per evaluating psychiatrist; taxi is recommended for patient to transport back. Worker called [facility] and [phone]) informed that patient was discharged and discharge paper work will be faxed to [phone] confirmed by Rosy (staff at [facility]). confirmation 3-- 8:30pm

## 2019-03-14 NOTE — ED BEHAVIORAL HEALTH ASSESSMENT NOTE - SUICIDE PROTECTIVE FACTORS
Supportive social network or family/Identifies reasons for living/Future oriented/Fear of death or dying due to pain/suffering/Positive therapeutic relationships Fear of death or dying due to pain/suffering

## 2019-03-14 NOTE — ED BEHAVIORAL HEALTH ASSESSMENT NOTE - RISK ASSESSMENT
Protective factors include no violence history, medication compliance, supportive housing, no access to guns, no global insomnia, no substance abuse, willingness to seek help, no homicidal ideation, no suicidal ideations at this time. Risk factors include history of suicidal thoughts in the past/suicidal gestures. Protective factors include no violence history, medication compliance, supportive housing, no access to guns, no substance abuse, willingness to seek help, no homicidal ideation, no active SI at this time, agrees to f/u. Risk factors include history of suicidal thoughts in the past/suicidal gestures, multiple hospitalizations poor social supports. Protective factors include no violence history, medication compliance, supportive housing, no access to guns, no substance abuse, willingness to seek help, no homicidal ideation, no active SI at this time, agrees to f/u. Chronic elevated background risk given risk factors include history of suicidal thoughts in the past/suicidal gestures, multiple hospitalizations poor social supports and BPD. Low imminent risk of self harm and patient is not currently an acute danger to self or others.

## 2019-03-14 NOTE — ED ADULT TRIAGE NOTE - CHIEF COMPLAINT QUOTE
p/t with hx depression c/o of increased depression and SI,  no plan, p/t appears calm and cooperative @ present , sent from adult home

## 2019-03-14 NOTE — ED BEHAVIORAL HEALTH ASSESSMENT NOTE - SAFETY PLAN DETAILS
Patient advised to return to ED or call 911 for any worsening symptoms or safety concerns. Extensive safety planning performed with patient and family. In addition to extensive discussion of safe places patient can go to, distraction techniques, coping skills and who patient can call in the event of crisis including various hotlines. Patient and family agreeing verbally to return patient to ER or call 911 if symptoms worsen or patient has urges to harm self or others.

## 2019-03-14 NOTE — ED BEHAVIORAL HEALTH ASSESSMENT NOTE - SUICIDE RISK FACTORS
Highly impulsive behavior/Agitation/severe anxiety Hopelessness/Highly impulsive behavior/Agitation/severe anxiety/Global insomnia

## 2019-03-14 NOTE — ED BEHAVIORAL HEALTH ASSESSMENT NOTE - CURRENT MEDICATION
Lamictal 150 mg po BID,  Seroquel 100mg HS, Klonopin 0.5mg PO TID Lamictal 150 mg po BID, has been taken seroquel 50mg qhs on her own

## 2019-03-14 NOTE — ED ADULT NURSE NOTE - NSIMPLEMENTINTERV_GEN_ALL_ED
Implemented All Universal Safety Interventions:  [place] to call system. Call bell, personal items and telephone within reach. Instruct patient to call for assistance. Room bathroom lighting operational. Non-slip footwear when patient is off stretcher. Physically safe environment: no spills, clutter or unnecessary equipment. Stretcher in lowest position, wheels locked, appropriate side rails in place.

## 2019-03-14 NOTE — ED BEHAVIORAL HEALTH ASSESSMENT NOTE - OTHER
other residents attention seeking at times but mostly cooperative Normal while in ER; chronically impaired impulse control when upset

## 2019-03-14 NOTE — ED BEHAVIORAL HEALTH ASSESSMENT NOTE - HPI (INCLUDE ILLNESS QUALITY, SEVERITY, DURATION, TIMING, CONTEXT, MODIFYING FACTORS, ASSOCIATED SIGNS AND SYMPTOMS)
43F, single, non-caregiver, disabled, domiciled [facility] in [place] since 12/2014, with history of Borderline Personality Disorder, Psychogenic Seizures, questionable Bipolar Disorder and post-traumatic stress disorder, history of suicide attempt vs gesture - superficially cut her neck with glass/did not require sutures/no scars in 2014, no relevant PMH, no history of substance abuse, history of trauma, no legal issues or access to firearms, BIB EMS for depression/anxiety in the context of breaking up with her boyfriend.     Pt resides at [facility] since 2014 but has been recently staying with her boyfriend, Lars. Pt states that this boyfriend is not a good influence, especially as he is an alcoholic. Yesterday, pt broke up with this boyfriend and decided to move back to [facility]. Pt went to an appointment yesterday with a new therapist, Renetta, at [facility], and stated that she had suicidal ideations in the context of this break up. Pt states the therapist called 911 as a result of this. Pt denied any suicidal ideations, intent or plans at this time. She stated she chronically has suicidal thoughts in the context of stressors, especially things like break ups. Pt noted that the fact she has a new therapist is also difficult for her as she does not do well with change/abandonment. Pt stated she is still anxious about returning to her residence as she does not like the staff there and all of her belongings are at her boyfriend's house.     Today, patient called her therapist's office and report SI and they told her to call 911 to be evaluated.  Pt endorses compliance with her medications, Klonopin, Seroquel, and Lamictal (for seizures?). Pt denies any s/s of judson or psychosis, denies auditory/visual hallucinations or delusions. Denies aggressive or homicidal ideations, intent or plans. Pt histrionic throughout interview but able to maintain behavioral control.    Yesterday, Khadra [facility] - She reported that pt has a hx of Bipolar d/o and Borderline PD but agrees that the majority of her presentation is related to the Borderline PD as opposed to Bipolar d/o. She stated that pt is extremely controlling and dictates her treatment at all times. Pt is not currently on a mood stabilizer or anti-depressant as per her own request but Dr. Morejon will work with her to start one of those. 43F, single, non-caregiver, disabled, domiciled [facility] in [place] since 12/2014, with history of Borderline Personality Disorder, Psychogenic Seizures, questionable Bipolar Disorder and post-traumatic stress disorder, history of suicide attempt vs gesture - superficially cut her neck with glass/did not require sutures/no scars in 2014, no relevant PMH, no history of substance abuse, history of trauma, no legal issues or access to firearms, BIB EMS for depression and SI the context of breaking up with her boyfriend.     In the ED, pt's demeanor dramatically changed from calm to anxious when writer approached. She would often start to cry during the interview but with no tears and would quickly stop. Pt resides at [facility] since 2014 and was living at times with her now ex-boyfriend Lars. Pt says he broke up with her 1 month ago and since then her depression and anxiety have worsened. Says that he life has fallen apart and she does not know what to do. She does say that it was a bad relationship and he was a verbally abusive alcoholic . Pt says that she has been having intermittent passive SI, chronic, though changed topics when asked. Pt later said she had SI with plan to break a bottle and use it to cut her neck. Note, pt has done this before and only light grazed her neck. Pt continued to be evasive when asked more details about her SI, but said she had not  taken any steps to harm herself, no SIB. She states that she tends to have these suicidal thoughts in the context of stressors, especially things like break ups. Pt says she saw a new therapist at [facility] and had a plan to see her again before seeing a psychiatrist there in 2 weeks. Pt said she could not wait any longer and told a  at her facility to call an ambulance because she was depressed. Says she left [facility] at the end of January because "both the psychiatrists were jerks". Pt says she is having trouble taking care of herself, was not more descriptive. Pt says she self d/yuri Effexor at the end of January, which made her manic, endorses poor sleep and increased spending (would not give amount), and general hyperactivity, for a few days. Denies current manic sx. Endorses depressed mood, hopelessness, anxiety, poor sleep, poor concentration, and passive SI. Denies current active SI. Denies psychotic sx. Denies substance use.    Spoke with Marti at pt's residence: Says pt is a private person and she has not seen her for a few days. Denies hearing about any different behaviors from pt. Was not aware that pt was sent to ED  Unable to reach pt's ? Jean Weinstein at [phone].  Unable to reach pt's mother at [phone] 43F, single, non-caregiver, disabled, domiciled [facility] in [place] since 12/2014, with history of Borderline Personality Disorder, Psychogenic Seizures, questionable Bipolar Disorder and post-traumatic stress disorder, history of suicide attempt vs gesture - superficially cut her neck with glass/did not require sutures/no scars in 2014, no relevant PMH, no history of substance abuse, history of trauma, no legal issues or access to firearms, BIB EMS for depression and SI the context of breaking up with her boyfriend.     In the ED, pt's demeanor dramatically changed from calm to anxious when writer approached. She would often start to cry during the interview but with no tears and would quickly stop. Pt resides at [facility] since 2014 and was living at times with her now ex-boyfriend Lars. Pt says he broke up with her 1 month ago and since then her depression and anxiety have worsened. Says that her "life has fallen apart" and she "does not know what to do". She does say that it was a bad relationship and he was a verbally abusive alcoholic . Pt says that she has been having intermittent passive SI, which is chronic. She reports SI of breaking a bottle and use it to cut her neck, but denies immediate plan or intent. Note, pt has done this before and only light grazed her neck as per records. Pt continued to be evasive when asked more details about her SI, but said she had not  taken any steps to harm herself, no SIB. She states that she chronically tends to have these suicidal thoughts in the context of stressors, especially things like break ups. Pt says she saw a new therapist at [facility] and had a plan to see her again before seeing a psychiatrist there in 2 weeks. Pt said she could not wait any longer and told a  at her facility to call an ambulance because she was depressed and "wanted to get started again on Effexor".. Says she left [facility] at the end of January because she states "both the psychiatrists were jerks". Pt says she is having trouble taking care of herself, was not more descriptive. Pt says she self d/yuri Effexor at the end of January. Denies current manic sx. Endorses depressed mood, hopelessness, anxiety, poor sleep, poor concentration, and passive SI. Denies current active SI. Denies psychotic sx. Denies substance use.    Spoke with Marti at pt's residence: Says pt is a private person and she has not seen her for a few days. Denies hearing about any different behaviors from pt. Was not aware that pt was sent to ED  Unable to reach pt's ? Jean Weinstein at [phone].  Unable to reach pt's mother at [phone]

## 2019-03-14 NOTE — ED BEHAVIORAL HEALTH ASSESSMENT NOTE - AXIS IV
Problems with primary support Problem related to social environment/Problems with primary support/Occupational problems/Economic problems

## 2019-03-14 NOTE — ED BEHAVIORAL HEALTH ASSESSMENT NOTE - DETAILS
Attempt vs gesture of cutting neck superficially with glass oxycodone, sudafed Outpatient team - unable to reach outpatient therapist but left message Outpatient team - unable to reach  Outpatient team - unable to reach , voicemail left.

## 2019-03-14 NOTE — ED PROVIDER NOTE - NS ED MD EM SELECTION
[facility]  OT orders for lymphedema  Derek Eubanks  Fax back  
Orders signed and faxed.  
[id number] Detailed

## 2019-03-14 NOTE — ED BEHAVIORAL HEALTH ASSESSMENT NOTE - DESCRIPTION
Tearful/histrionic at times, maintained behavioral control.    ICU Vital Signs Last 24 Hrs  T(C): 36.9 (27 Sep 2017 14:42), Max: 36.9 (27 Sep 2017 14:42)  T(F): 98.5 (27 Sep 2017 14:42), Max: 98.5 (27 Sep 2017 14:42)  HR: 78 (27 Sep 2017 14:42) (78 - 78)  BP: 114/73 (27 Sep 2017 14:42) (114/73 - 114/73)  BP(mean): --  ABP: --  ABP(mean): --  RR: 18 (27 Sep 2017 14:42) (18 - 18)  SpO2: 99% (27 Sep 2017 14:42) (99% - 99%) Nonepileptic seizures see HPI Tearful/histrionic at times, maintained behavioral control.    Vital Signs Last 24 Hrs  T(C): 36.7 (14 Mar 2019 16:37), Max: 36.7 (14 Mar 2019 16:37)  T(F): 98 (14 Mar 2019 16:37), Max: 98 (14 Mar 2019 16:37)  HR: 90 (14 Mar 2019 16:37) (90 - 90)  BP: 130/62 (14 Mar 2019 16:37) (130/62 - 130/62)  BP(mean): --  RR: 18 (14 Mar 2019 16:37) (18 - 18)  SpO2: 98% (14 Mar 2019 16:37) (98% - 98%)

## 2019-03-15 ENCOUNTER — INPATIENT (INPATIENT)
Facility: HOSPITAL | Age: 44
LOS: 18 days | Discharge: HOME | End: 2019-04-03
Attending: PSYCHIATRY & NEUROLOGY | Admitting: PSYCHIATRY & NEUROLOGY
Payer: MEDICARE

## 2019-03-15 ENCOUNTER — OUTPATIENT (OUTPATIENT)
Dept: OUTPATIENT SERVICES | Facility: HOSPITAL | Age: 44
LOS: 1 days | Discharge: STILL ADMITTED OR ADMINISTRATIVE | End: 2019-03-15
Payer: MEDICARE

## 2019-03-15 VITALS — HEIGHT: 67 IN | TEMPERATURE: 98 F | WEIGHT: 169.98 LBS

## 2019-03-15 DIAGNOSIS — F33.9 MAJOR DEPRESSIVE DISORDER, RECURRENT, UNSPECIFIED: ICD-10-CM

## 2019-03-15 PROCEDURE — 99222 1ST HOSP IP/OBS MODERATE 55: CPT

## 2019-03-15 RX ORDER — LAMOTRIGINE 25 MG/1
150 TABLET, ORALLY DISINTEGRATING ORAL
Qty: 0 | Refills: 0 | Status: DISCONTINUED | OUTPATIENT
Start: 2019-03-15 | End: 2019-04-03

## 2019-03-15 RX ORDER — HALOPERIDOL DECANOATE 100 MG/ML
5 INJECTION INTRAMUSCULAR ONCE
Qty: 0 | Refills: 0 | Status: DISCONTINUED | OUTPATIENT
Start: 2019-03-15 | End: 2019-04-03

## 2019-03-15 RX ORDER — DIPHENHYDRAMINE HCL 50 MG
50 CAPSULE ORAL ONCE
Qty: 0 | Refills: 0 | Status: DISCONTINUED | OUTPATIENT
Start: 2019-03-15 | End: 2019-04-03

## 2019-03-15 RX ORDER — CLONAZEPAM 1 MG
0.5 TABLET ORAL THREE TIMES A DAY
Qty: 0 | Refills: 0 | Status: DISCONTINUED | OUTPATIENT
Start: 2019-03-15 | End: 2019-03-16

## 2019-03-15 RX ORDER — QUETIAPINE FUMARATE 200 MG/1
100 TABLET, FILM COATED ORAL AT BEDTIME
Qty: 0 | Refills: 0 | Status: DISCONTINUED | OUTPATIENT
Start: 2019-03-15 | End: 2019-03-16

## 2019-03-15 RX ORDER — FAMOTIDINE 10 MG/ML
20 INJECTION INTRAVENOUS
Qty: 0 | Refills: 0 | Status: DISCONTINUED | OUTPATIENT
Start: 2019-03-15 | End: 2019-04-03

## 2019-03-15 RX ORDER — DIPHENHYDRAMINE HCL 50 MG
50 CAPSULE ORAL EVERY 6 HOURS
Qty: 0 | Refills: 0 | Status: DISCONTINUED | OUTPATIENT
Start: 2019-03-15 | End: 2019-04-03

## 2019-03-15 RX ADMIN — FAMOTIDINE 20 MILLIGRAM(S): 10 INJECTION INTRAVENOUS at 21:08

## 2019-03-15 RX ADMIN — QUETIAPINE FUMARATE 100 MILLIGRAM(S): 200 TABLET, FILM COATED ORAL at 21:09

## 2019-03-15 RX ADMIN — LAMOTRIGINE 150 MILLIGRAM(S): 25 TABLET, ORALLY DISINTEGRATING ORAL at 21:08

## 2019-03-16 LAB
ALBUMIN SERPL ELPH-MCNC: 4.5 G/DL — SIGNIFICANT CHANGE UP (ref 3.3–5)
ALP SERPL-CCNC: 106 U/L — SIGNIFICANT CHANGE UP (ref 40–120)
ALT FLD-CCNC: 14 U/L — SIGNIFICANT CHANGE UP (ref 4–33)
ANION GAP SERPL CALC-SCNC: 16 MMO/L — HIGH (ref 7–14)
AST SERPL-CCNC: 16 U/L — SIGNIFICANT CHANGE UP (ref 4–32)
BASOPHILS # BLD AUTO: 0.04 K/UL — SIGNIFICANT CHANGE UP (ref 0–0.2)
BASOPHILS NFR BLD AUTO: 0.3 % — SIGNIFICANT CHANGE UP (ref 0–2)
BILIRUB SERPL-MCNC: 0.3 MG/DL — SIGNIFICANT CHANGE UP (ref 0.2–1.2)
BUN SERPL-MCNC: 14 MG/DL — SIGNIFICANT CHANGE UP (ref 7–23)
CALCIUM SERPL-MCNC: 10.8 MG/DL — HIGH (ref 8.4–10.5)
CHLORIDE SERPL-SCNC: 100 MMOL/L — SIGNIFICANT CHANGE UP (ref 98–107)
CHOLEST SERPL-MCNC: 261 MG/DL — HIGH (ref 120–199)
CO2 SERPL-SCNC: 22 MMOL/L — SIGNIFICANT CHANGE UP (ref 22–31)
CREAT SERPL-MCNC: 0.95 MG/DL — SIGNIFICANT CHANGE UP (ref 0.5–1.3)
EOSINOPHIL # BLD AUTO: 0.17 K/UL — SIGNIFICANT CHANGE UP (ref 0–0.5)
EOSINOPHIL NFR BLD AUTO: 1.4 % — SIGNIFICANT CHANGE UP (ref 0–6)
GLUCOSE SERPL-MCNC: 172 MG/DL — HIGH (ref 70–99)
HBA1C BLD-MCNC: 5.4 % — SIGNIFICANT CHANGE UP (ref 4–5.6)
HCG SERPL-ACNC: < 5 MIU/ML — SIGNIFICANT CHANGE UP
HCT VFR BLD CALC: 43.6 % — SIGNIFICANT CHANGE UP (ref 34.5–45)
HDLC SERPL-MCNC: 75 MG/DL — HIGH (ref 45–65)
HGB BLD-MCNC: 13.9 G/DL — SIGNIFICANT CHANGE UP (ref 11.5–15.5)
IMM GRANULOCYTES NFR BLD AUTO: 0.6 % — SIGNIFICANT CHANGE UP (ref 0–1.5)
LIPID PNL WITH DIRECT LDL SERPL: 203 MG/DL — SIGNIFICANT CHANGE UP
LYMPHOCYTES # BLD AUTO: 2.49 K/UL — SIGNIFICANT CHANGE UP (ref 1–3.3)
LYMPHOCYTES # BLD AUTO: 20.5 % — SIGNIFICANT CHANGE UP (ref 13–44)
MAGNESIUM SERPL-MCNC: 1.8 MG/DL — SIGNIFICANT CHANGE UP (ref 1.6–2.6)
MCHC RBC-ENTMCNC: 30.1 PG — SIGNIFICANT CHANGE UP (ref 27–34)
MCHC RBC-ENTMCNC: 31.9 % — LOW (ref 32–36)
MCV RBC AUTO: 94.4 FL — SIGNIFICANT CHANGE UP (ref 80–100)
MONOCYTES # BLD AUTO: 0.9 K/UL — SIGNIFICANT CHANGE UP (ref 0–0.9)
MONOCYTES NFR BLD AUTO: 7.4 % — SIGNIFICANT CHANGE UP (ref 2–14)
NEUTROPHILS # BLD AUTO: 8.5 K/UL — HIGH (ref 1.8–7.4)
NEUTROPHILS NFR BLD AUTO: 69.8 % — SIGNIFICANT CHANGE UP (ref 43–77)
NRBC # FLD: 0 K/UL — LOW (ref 25–125)
PLATELET # BLD AUTO: 331 K/UL — SIGNIFICANT CHANGE UP (ref 150–400)
PMV BLD: 9.4 FL — SIGNIFICANT CHANGE UP (ref 7–13)
POTASSIUM SERPL-MCNC: 4 MMOL/L — SIGNIFICANT CHANGE UP (ref 3.5–5.3)
POTASSIUM SERPL-SCNC: 4 MMOL/L — SIGNIFICANT CHANGE UP (ref 3.5–5.3)
PROT SERPL-MCNC: 6.9 G/DL — SIGNIFICANT CHANGE UP (ref 6–8.3)
RBC # BLD: 4.62 M/UL — SIGNIFICANT CHANGE UP (ref 3.8–5.2)
RBC # FLD: 12.3 % — SIGNIFICANT CHANGE UP (ref 10.3–14.5)
SODIUM SERPL-SCNC: 138 MMOL/L — SIGNIFICANT CHANGE UP (ref 135–145)
TRIGL SERPL-MCNC: 140 MG/DL — SIGNIFICANT CHANGE UP (ref 10–149)
TSH SERPL-MCNC: 1.7 UIU/ML — SIGNIFICANT CHANGE UP (ref 0.27–4.2)
WBC # BLD: 12.17 K/UL — HIGH (ref 3.8–10.5)
WBC # FLD AUTO: 12.17 K/UL — HIGH (ref 3.8–10.5)

## 2019-03-16 PROCEDURE — 99222 1ST HOSP IP/OBS MODERATE 55: CPT

## 2019-03-16 RX ORDER — IBUPROFEN 200 MG
600 TABLET ORAL ONCE
Qty: 0 | Refills: 0 | Status: COMPLETED | OUTPATIENT
Start: 2019-03-16 | End: 2019-03-16

## 2019-03-16 RX ORDER — CLONAZEPAM 1 MG
0.5 TABLET ORAL ONCE
Qty: 0 | Refills: 0 | Status: DISCONTINUED | OUTPATIENT
Start: 2019-03-16 | End: 2019-03-16

## 2019-03-16 RX ORDER — QUETIAPINE FUMARATE 200 MG/1
100 TABLET, FILM COATED ORAL AT BEDTIME
Qty: 0 | Refills: 0 | Status: DISCONTINUED | OUTPATIENT
Start: 2019-03-16 | End: 2019-04-01

## 2019-03-16 RX ORDER — CLONAZEPAM 1 MG
1 TABLET ORAL THREE TIMES A DAY
Qty: 0 | Refills: 0 | Status: DISCONTINUED | OUTPATIENT
Start: 2019-03-16 | End: 2019-03-20

## 2019-03-16 RX ORDER — LURASIDONE HYDROCHLORIDE 40 MG/1
20 TABLET ORAL
Qty: 0 | Refills: 0 | Status: DISCONTINUED | OUTPATIENT
Start: 2019-03-16 | End: 2019-03-19

## 2019-03-16 RX ADMIN — Medication 1 MILLIGRAM(S): at 18:34

## 2019-03-16 RX ADMIN — Medication 600 MILLIGRAM(S): at 15:26

## 2019-03-16 RX ADMIN — LAMOTRIGINE 150 MILLIGRAM(S): 25 TABLET, ORALLY DISINTEGRATING ORAL at 22:21

## 2019-03-16 RX ADMIN — FAMOTIDINE 20 MILLIGRAM(S): 10 INJECTION INTRAVENOUS at 22:21

## 2019-03-16 RX ADMIN — FAMOTIDINE 20 MILLIGRAM(S): 10 INJECTION INTRAVENOUS at 08:37

## 2019-03-16 RX ADMIN — Medication 0.5 MILLIGRAM(S): at 13:21

## 2019-03-16 RX ADMIN — Medication 0.5 MILLIGRAM(S): at 11:28

## 2019-03-16 RX ADMIN — Medication 0.5 MILLIGRAM(S): at 05:23

## 2019-03-16 RX ADMIN — Medication 600 MILLIGRAM(S): at 16:49

## 2019-03-16 RX ADMIN — LAMOTRIGINE 150 MILLIGRAM(S): 25 TABLET, ORALLY DISINTEGRATING ORAL at 08:37

## 2019-03-17 PROCEDURE — 99231 SBSQ HOSP IP/OBS SF/LOW 25: CPT

## 2019-03-17 RX ORDER — IBUPROFEN 200 MG
600 TABLET ORAL ONCE
Qty: 0 | Refills: 0 | Status: DISCONTINUED | OUTPATIENT
Start: 2019-03-17 | End: 2019-04-03

## 2019-03-17 RX ADMIN — FAMOTIDINE 20 MILLIGRAM(S): 10 INJECTION INTRAVENOUS at 21:23

## 2019-03-17 RX ADMIN — Medication 1 MILLIGRAM(S): at 09:42

## 2019-03-17 RX ADMIN — LAMOTRIGINE 150 MILLIGRAM(S): 25 TABLET, ORALLY DISINTEGRATING ORAL at 09:06

## 2019-03-17 RX ADMIN — Medication 1 MILLIGRAM(S): at 16:13

## 2019-03-17 RX ADMIN — LAMOTRIGINE 150 MILLIGRAM(S): 25 TABLET, ORALLY DISINTEGRATING ORAL at 21:23

## 2019-03-17 RX ADMIN — FAMOTIDINE 20 MILLIGRAM(S): 10 INJECTION INTRAVENOUS at 09:06

## 2019-03-18 DIAGNOSIS — F33.2 MAJOR DEPRESSIVE DISORDER, RECURRENT SEVERE WITHOUT PSYCHOTIC FEATURES: ICD-10-CM

## 2019-03-18 DIAGNOSIS — F60.3 BORDERLINE PERSONALITY DISORDER: ICD-10-CM

## 2019-03-18 PROCEDURE — 99232 SBSQ HOSP IP/OBS MODERATE 35: CPT

## 2019-03-18 RX ORDER — SERTRALINE 25 MG/1
25 TABLET, FILM COATED ORAL DAILY
Qty: 0 | Refills: 0 | Status: DISCONTINUED | OUTPATIENT
Start: 2019-03-18 | End: 2019-03-22

## 2019-03-18 RX ADMIN — Medication 1 MILLIGRAM(S): at 09:16

## 2019-03-18 RX ADMIN — LAMOTRIGINE 150 MILLIGRAM(S): 25 TABLET, ORALLY DISINTEGRATING ORAL at 21:09

## 2019-03-18 RX ADMIN — LAMOTRIGINE 150 MILLIGRAM(S): 25 TABLET, ORALLY DISINTEGRATING ORAL at 08:45

## 2019-03-18 RX ADMIN — FAMOTIDINE 20 MILLIGRAM(S): 10 INJECTION INTRAVENOUS at 21:09

## 2019-03-18 RX ADMIN — Medication 1 MILLIGRAM(S): at 17:22

## 2019-03-18 RX ADMIN — QUETIAPINE FUMARATE 100 MILLIGRAM(S): 200 TABLET, FILM COATED ORAL at 21:09

## 2019-03-18 RX ADMIN — FAMOTIDINE 20 MILLIGRAM(S): 10 INJECTION INTRAVENOUS at 08:45

## 2019-03-19 PROCEDURE — 99232 SBSQ HOSP IP/OBS MODERATE 35: CPT | Mod: 25

## 2019-03-19 RX ADMIN — Medication 1 MILLIGRAM(S): at 11:28

## 2019-03-19 RX ADMIN — QUETIAPINE FUMARATE 100 MILLIGRAM(S): 200 TABLET, FILM COATED ORAL at 21:25

## 2019-03-19 RX ADMIN — LAMOTRIGINE 150 MILLIGRAM(S): 25 TABLET, ORALLY DISINTEGRATING ORAL at 21:26

## 2019-03-19 RX ADMIN — SERTRALINE 25 MILLIGRAM(S): 25 TABLET, FILM COATED ORAL at 09:07

## 2019-03-19 RX ADMIN — Medication 1 MILLIGRAM(S): at 17:44

## 2019-03-19 RX ADMIN — FAMOTIDINE 20 MILLIGRAM(S): 10 INJECTION INTRAVENOUS at 09:06

## 2019-03-19 RX ADMIN — FAMOTIDINE 20 MILLIGRAM(S): 10 INJECTION INTRAVENOUS at 21:26

## 2019-03-19 RX ADMIN — LAMOTRIGINE 150 MILLIGRAM(S): 25 TABLET, ORALLY DISINTEGRATING ORAL at 09:07

## 2019-03-20 PROCEDURE — 99232 SBSQ HOSP IP/OBS MODERATE 35: CPT

## 2019-03-20 RX ORDER — CLONAZEPAM 1 MG
1 TABLET ORAL THREE TIMES A DAY
Qty: 0 | Refills: 0 | Status: DISCONTINUED | OUTPATIENT
Start: 2019-03-20 | End: 2019-03-21

## 2019-03-20 RX ADMIN — SERTRALINE 25 MILLIGRAM(S): 25 TABLET, FILM COATED ORAL at 08:39

## 2019-03-20 RX ADMIN — Medication 1 MILLIGRAM(S): at 08:38

## 2019-03-20 RX ADMIN — FAMOTIDINE 20 MILLIGRAM(S): 10 INJECTION INTRAVENOUS at 22:00

## 2019-03-20 RX ADMIN — QUETIAPINE FUMARATE 100 MILLIGRAM(S): 200 TABLET, FILM COATED ORAL at 22:00

## 2019-03-20 RX ADMIN — LAMOTRIGINE 150 MILLIGRAM(S): 25 TABLET, ORALLY DISINTEGRATING ORAL at 08:39

## 2019-03-20 RX ADMIN — Medication 1 MILLIGRAM(S): at 15:45

## 2019-03-20 RX ADMIN — FAMOTIDINE 20 MILLIGRAM(S): 10 INJECTION INTRAVENOUS at 08:39

## 2019-03-20 RX ADMIN — LAMOTRIGINE 150 MILLIGRAM(S): 25 TABLET, ORALLY DISINTEGRATING ORAL at 20:00

## 2019-03-21 PROCEDURE — 99232 SBSQ HOSP IP/OBS MODERATE 35: CPT

## 2019-03-21 RX ORDER — CLONAZEPAM 1 MG
1 TABLET ORAL
Qty: 0 | Refills: 0 | Status: DISCONTINUED | OUTPATIENT
Start: 2019-03-21 | End: 2019-03-25

## 2019-03-21 RX ORDER — QUETIAPINE FUMARATE 200 MG/1
25 TABLET, FILM COATED ORAL EVERY 4 HOURS
Qty: 0 | Refills: 0 | Status: DISCONTINUED | OUTPATIENT
Start: 2019-03-21 | End: 2019-03-22

## 2019-03-21 RX ADMIN — FAMOTIDINE 20 MILLIGRAM(S): 10 INJECTION INTRAVENOUS at 21:18

## 2019-03-21 RX ADMIN — Medication 1 MILLIGRAM(S): at 09:15

## 2019-03-21 RX ADMIN — LAMOTRIGINE 150 MILLIGRAM(S): 25 TABLET, ORALLY DISINTEGRATING ORAL at 09:19

## 2019-03-21 RX ADMIN — QUETIAPINE FUMARATE 25 MILLIGRAM(S): 200 TABLET, FILM COATED ORAL at 18:20

## 2019-03-21 RX ADMIN — FAMOTIDINE 20 MILLIGRAM(S): 10 INJECTION INTRAVENOUS at 09:19

## 2019-03-21 RX ADMIN — SERTRALINE 25 MILLIGRAM(S): 25 TABLET, FILM COATED ORAL at 09:19

## 2019-03-21 RX ADMIN — QUETIAPINE FUMARATE 100 MILLIGRAM(S): 200 TABLET, FILM COATED ORAL at 21:18

## 2019-03-21 RX ADMIN — LAMOTRIGINE 150 MILLIGRAM(S): 25 TABLET, ORALLY DISINTEGRATING ORAL at 21:18

## 2019-03-22 PROCEDURE — 99232 SBSQ HOSP IP/OBS MODERATE 35: CPT

## 2019-03-22 RX ORDER — SERTRALINE 25 MG/1
50 TABLET, FILM COATED ORAL DAILY
Qty: 0 | Refills: 0 | Status: DISCONTINUED | OUTPATIENT
Start: 2019-03-22 | End: 2019-03-26

## 2019-03-22 RX ORDER — QUETIAPINE FUMARATE 200 MG/1
12.5 TABLET, FILM COATED ORAL
Qty: 0 | Refills: 0 | Status: DISCONTINUED | OUTPATIENT
Start: 2019-03-22 | End: 2019-03-26

## 2019-03-22 RX ORDER — QUETIAPINE FUMARATE 200 MG/1
25 TABLET, FILM COATED ORAL EVERY 4 HOURS
Qty: 0 | Refills: 0 | Status: DISCONTINUED | OUTPATIENT
Start: 2019-03-22 | End: 2019-03-22

## 2019-03-22 RX ADMIN — SERTRALINE 25 MILLIGRAM(S): 25 TABLET, FILM COATED ORAL at 08:41

## 2019-03-22 RX ADMIN — QUETIAPINE FUMARATE 25 MILLIGRAM(S): 200 TABLET, FILM COATED ORAL at 08:40

## 2019-03-22 RX ADMIN — LAMOTRIGINE 150 MILLIGRAM(S): 25 TABLET, ORALLY DISINTEGRATING ORAL at 08:41

## 2019-03-22 RX ADMIN — Medication 1 MILLIGRAM(S): at 12:43

## 2019-03-22 RX ADMIN — FAMOTIDINE 20 MILLIGRAM(S): 10 INJECTION INTRAVENOUS at 08:41

## 2019-03-22 RX ADMIN — FAMOTIDINE 20 MILLIGRAM(S): 10 INJECTION INTRAVENOUS at 21:34

## 2019-03-22 RX ADMIN — LAMOTRIGINE 150 MILLIGRAM(S): 25 TABLET, ORALLY DISINTEGRATING ORAL at 21:35

## 2019-03-23 RX ADMIN — LAMOTRIGINE 150 MILLIGRAM(S): 25 TABLET, ORALLY DISINTEGRATING ORAL at 21:26

## 2019-03-23 RX ADMIN — FAMOTIDINE 20 MILLIGRAM(S): 10 INJECTION INTRAVENOUS at 21:26

## 2019-03-23 RX ADMIN — QUETIAPINE FUMARATE 12.5 MILLIGRAM(S): 200 TABLET, FILM COATED ORAL at 06:35

## 2019-03-23 RX ADMIN — LAMOTRIGINE 150 MILLIGRAM(S): 25 TABLET, ORALLY DISINTEGRATING ORAL at 09:50

## 2019-03-23 RX ADMIN — QUETIAPINE FUMARATE 12.5 MILLIGRAM(S): 200 TABLET, FILM COATED ORAL at 12:05

## 2019-03-23 RX ADMIN — FAMOTIDINE 20 MILLIGRAM(S): 10 INJECTION INTRAVENOUS at 09:50

## 2019-03-23 RX ADMIN — QUETIAPINE FUMARATE 100 MILLIGRAM(S): 200 TABLET, FILM COATED ORAL at 21:26

## 2019-03-23 RX ADMIN — QUETIAPINE FUMARATE 12.5 MILLIGRAM(S): 200 TABLET, FILM COATED ORAL at 17:17

## 2019-03-23 RX ADMIN — SERTRALINE 50 MILLIGRAM(S): 25 TABLET, FILM COATED ORAL at 09:50

## 2019-03-24 RX ADMIN — LAMOTRIGINE 150 MILLIGRAM(S): 25 TABLET, ORALLY DISINTEGRATING ORAL at 09:18

## 2019-03-24 RX ADMIN — SERTRALINE 50 MILLIGRAM(S): 25 TABLET, FILM COATED ORAL at 09:18

## 2019-03-24 RX ADMIN — Medication 50 MILLIGRAM(S): at 01:00

## 2019-03-24 RX ADMIN — Medication 1 MILLIGRAM(S): at 09:20

## 2019-03-24 RX ADMIN — FAMOTIDINE 20 MILLIGRAM(S): 10 INJECTION INTRAVENOUS at 09:18

## 2019-03-24 RX ADMIN — LAMOTRIGINE 150 MILLIGRAM(S): 25 TABLET, ORALLY DISINTEGRATING ORAL at 20:26

## 2019-03-24 RX ADMIN — FAMOTIDINE 20 MILLIGRAM(S): 10 INJECTION INTRAVENOUS at 20:26

## 2019-03-24 RX ADMIN — QUETIAPINE FUMARATE 100 MILLIGRAM(S): 200 TABLET, FILM COATED ORAL at 20:26

## 2019-03-24 RX ADMIN — QUETIAPINE FUMARATE 12.5 MILLIGRAM(S): 200 TABLET, FILM COATED ORAL at 12:40

## 2019-03-24 RX ADMIN — Medication 1 MILLIGRAM(S): at 01:29

## 2019-03-25 PROCEDURE — 99232 SBSQ HOSP IP/OBS MODERATE 35: CPT

## 2019-03-25 RX ORDER — CLONAZEPAM 1 MG
0.5 TABLET ORAL
Qty: 0 | Refills: 0 | Status: DISCONTINUED | OUTPATIENT
Start: 2019-03-25 | End: 2019-03-26

## 2019-03-25 RX ADMIN — SERTRALINE 50 MILLIGRAM(S): 25 TABLET, FILM COATED ORAL at 09:46

## 2019-03-25 RX ADMIN — QUETIAPINE FUMARATE 12.5 MILLIGRAM(S): 200 TABLET, FILM COATED ORAL at 09:50

## 2019-03-25 RX ADMIN — FAMOTIDINE 20 MILLIGRAM(S): 10 INJECTION INTRAVENOUS at 09:46

## 2019-03-25 RX ADMIN — Medication 50 MILLIGRAM(S): at 21:48

## 2019-03-25 RX ADMIN — QUETIAPINE FUMARATE 12.5 MILLIGRAM(S): 200 TABLET, FILM COATED ORAL at 17:10

## 2019-03-25 RX ADMIN — LAMOTRIGINE 150 MILLIGRAM(S): 25 TABLET, ORALLY DISINTEGRATING ORAL at 09:46

## 2019-03-25 RX ADMIN — Medication 50 MILLIGRAM(S): at 03:22

## 2019-03-25 RX ADMIN — FAMOTIDINE 20 MILLIGRAM(S): 10 INJECTION INTRAVENOUS at 21:20

## 2019-03-25 RX ADMIN — Medication 0.5 MILLIGRAM(S): at 20:51

## 2019-03-25 RX ADMIN — LAMOTRIGINE 150 MILLIGRAM(S): 25 TABLET, ORALLY DISINTEGRATING ORAL at 21:20

## 2019-03-25 RX ADMIN — QUETIAPINE FUMARATE 12.5 MILLIGRAM(S): 200 TABLET, FILM COATED ORAL at 03:22

## 2019-03-26 PROCEDURE — 90853 GROUP PSYCHOTHERAPY: CPT

## 2019-03-26 PROCEDURE — 99232 SBSQ HOSP IP/OBS MODERATE 35: CPT | Mod: 25

## 2019-03-26 RX ORDER — CLONAZEPAM 1 MG
1 TABLET ORAL
Qty: 0 | Refills: 0 | Status: DISCONTINUED | OUTPATIENT
Start: 2019-03-26 | End: 2019-03-27

## 2019-03-26 RX ORDER — SERTRALINE 25 MG/1
75 TABLET, FILM COATED ORAL DAILY
Qty: 0 | Refills: 0 | Status: DISCONTINUED | OUTPATIENT
Start: 2019-03-26 | End: 2019-04-03

## 2019-03-26 RX ORDER — HYDROXYZINE HCL 10 MG
25 TABLET ORAL
Qty: 0 | Refills: 0 | Status: DISCONTINUED | OUTPATIENT
Start: 2019-03-26 | End: 2019-04-03

## 2019-03-26 RX ADMIN — Medication 25 MILLIGRAM(S): at 14:51

## 2019-03-26 RX ADMIN — LAMOTRIGINE 150 MILLIGRAM(S): 25 TABLET, ORALLY DISINTEGRATING ORAL at 21:04

## 2019-03-26 RX ADMIN — QUETIAPINE FUMARATE 100 MILLIGRAM(S): 200 TABLET, FILM COATED ORAL at 21:04

## 2019-03-26 RX ADMIN — Medication 25 MILLIGRAM(S): at 09:25

## 2019-03-26 RX ADMIN — LAMOTRIGINE 150 MILLIGRAM(S): 25 TABLET, ORALLY DISINTEGRATING ORAL at 08:38

## 2019-03-26 RX ADMIN — QUETIAPINE FUMARATE 12.5 MILLIGRAM(S): 200 TABLET, FILM COATED ORAL at 02:31

## 2019-03-26 RX ADMIN — FAMOTIDINE 20 MILLIGRAM(S): 10 INJECTION INTRAVENOUS at 08:38

## 2019-03-26 RX ADMIN — Medication 50 MILLIGRAM(S): at 21:47

## 2019-03-26 RX ADMIN — Medication 25 MILLIGRAM(S): at 20:22

## 2019-03-26 RX ADMIN — Medication 0.5 MILLIGRAM(S): at 05:59

## 2019-03-26 RX ADMIN — FAMOTIDINE 20 MILLIGRAM(S): 10 INJECTION INTRAVENOUS at 21:04

## 2019-03-26 RX ADMIN — Medication 1 MILLIGRAM(S): at 21:04

## 2019-03-26 RX ADMIN — SERTRALINE 50 MILLIGRAM(S): 25 TABLET, FILM COATED ORAL at 08:38

## 2019-03-27 PROCEDURE — 90834 PSYTX W PT 45 MINUTES: CPT | Mod: 59

## 2019-03-27 PROCEDURE — 90853 GROUP PSYCHOTHERAPY: CPT

## 2019-03-27 PROCEDURE — 99232 SBSQ HOSP IP/OBS MODERATE 35: CPT | Mod: 25

## 2019-03-27 RX ORDER — CLONAZEPAM 1 MG
1 TABLET ORAL
Qty: 0 | Refills: 0 | Status: DISCONTINUED | OUTPATIENT
Start: 2019-03-27 | End: 2019-04-03

## 2019-03-27 RX ADMIN — SERTRALINE 75 MILLIGRAM(S): 25 TABLET, FILM COATED ORAL at 09:19

## 2019-03-27 RX ADMIN — Medication 50 MILLIGRAM(S): at 22:52

## 2019-03-27 RX ADMIN — Medication 1 MILLIGRAM(S): at 22:00

## 2019-03-27 RX ADMIN — FAMOTIDINE 20 MILLIGRAM(S): 10 INJECTION INTRAVENOUS at 09:19

## 2019-03-27 RX ADMIN — FAMOTIDINE 20 MILLIGRAM(S): 10 INJECTION INTRAVENOUS at 22:00

## 2019-03-27 RX ADMIN — Medication 25 MILLIGRAM(S): at 14:00

## 2019-03-27 RX ADMIN — LAMOTRIGINE 150 MILLIGRAM(S): 25 TABLET, ORALLY DISINTEGRATING ORAL at 09:19

## 2019-03-27 RX ADMIN — Medication 25 MILLIGRAM(S): at 17:26

## 2019-03-27 RX ADMIN — LAMOTRIGINE 150 MILLIGRAM(S): 25 TABLET, ORALLY DISINTEGRATING ORAL at 22:00

## 2019-03-27 RX ADMIN — Medication 1 MILLIGRAM(S): at 09:19

## 2019-03-27 RX ADMIN — QUETIAPINE FUMARATE 100 MILLIGRAM(S): 200 TABLET, FILM COATED ORAL at 22:02

## 2019-03-27 RX ADMIN — Medication 25 MILLIGRAM(S): at 04:29

## 2019-03-28 PROCEDURE — 99232 SBSQ HOSP IP/OBS MODERATE 35: CPT

## 2019-03-28 RX ADMIN — FAMOTIDINE 20 MILLIGRAM(S): 10 INJECTION INTRAVENOUS at 21:04

## 2019-03-28 RX ADMIN — LAMOTRIGINE 150 MILLIGRAM(S): 25 TABLET, ORALLY DISINTEGRATING ORAL at 21:04

## 2019-03-28 RX ADMIN — SERTRALINE 75 MILLIGRAM(S): 25 TABLET, FILM COATED ORAL at 08:30

## 2019-03-28 RX ADMIN — Medication 25 MILLIGRAM(S): at 14:51

## 2019-03-28 RX ADMIN — Medication 25 MILLIGRAM(S): at 07:06

## 2019-03-28 RX ADMIN — Medication 1 MILLIGRAM(S): at 08:30

## 2019-03-28 RX ADMIN — Medication 1 MILLIGRAM(S): at 21:04

## 2019-03-28 RX ADMIN — QUETIAPINE FUMARATE 100 MILLIGRAM(S): 200 TABLET, FILM COATED ORAL at 21:04

## 2019-03-28 RX ADMIN — FAMOTIDINE 20 MILLIGRAM(S): 10 INJECTION INTRAVENOUS at 08:30

## 2019-03-28 RX ADMIN — LAMOTRIGINE 150 MILLIGRAM(S): 25 TABLET, ORALLY DISINTEGRATING ORAL at 08:30

## 2019-03-29 PROCEDURE — 90832 PSYTX W PT 30 MINUTES: CPT | Mod: 59

## 2019-03-29 PROCEDURE — 99232 SBSQ HOSP IP/OBS MODERATE 35: CPT | Mod: 25

## 2019-03-29 PROCEDURE — 90853 GROUP PSYCHOTHERAPY: CPT

## 2019-03-29 RX ADMIN — Medication 25 MILLIGRAM(S): at 15:06

## 2019-03-29 RX ADMIN — QUETIAPINE FUMARATE 100 MILLIGRAM(S): 200 TABLET, FILM COATED ORAL at 21:00

## 2019-03-29 RX ADMIN — FAMOTIDINE 20 MILLIGRAM(S): 10 INJECTION INTRAVENOUS at 09:04

## 2019-03-29 RX ADMIN — Medication 50 MILLIGRAM(S): at 22:03

## 2019-03-29 RX ADMIN — FAMOTIDINE 20 MILLIGRAM(S): 10 INJECTION INTRAVENOUS at 21:00

## 2019-03-29 RX ADMIN — SERTRALINE 75 MILLIGRAM(S): 25 TABLET, FILM COATED ORAL at 09:04

## 2019-03-29 RX ADMIN — Medication 1 MILLIGRAM(S): at 09:04

## 2019-03-29 RX ADMIN — LAMOTRIGINE 150 MILLIGRAM(S): 25 TABLET, ORALLY DISINTEGRATING ORAL at 09:04

## 2019-03-29 RX ADMIN — LAMOTRIGINE 150 MILLIGRAM(S): 25 TABLET, ORALLY DISINTEGRATING ORAL at 21:00

## 2019-03-29 RX ADMIN — Medication 25 MILLIGRAM(S): at 06:05

## 2019-03-29 RX ADMIN — Medication 1 MILLIGRAM(S): at 21:00

## 2019-03-30 RX ADMIN — FAMOTIDINE 20 MILLIGRAM(S): 10 INJECTION INTRAVENOUS at 21:16

## 2019-03-30 RX ADMIN — Medication 25 MILLIGRAM(S): at 17:02

## 2019-03-30 RX ADMIN — SERTRALINE 75 MILLIGRAM(S): 25 TABLET, FILM COATED ORAL at 08:33

## 2019-03-30 RX ADMIN — LAMOTRIGINE 150 MILLIGRAM(S): 25 TABLET, ORALLY DISINTEGRATING ORAL at 08:33

## 2019-03-30 RX ADMIN — Medication 25 MILLIGRAM(S): at 06:50

## 2019-03-30 RX ADMIN — Medication 1 MILLIGRAM(S): at 08:33

## 2019-03-30 RX ADMIN — LAMOTRIGINE 150 MILLIGRAM(S): 25 TABLET, ORALLY DISINTEGRATING ORAL at 21:16

## 2019-03-30 RX ADMIN — Medication 1 MILLIGRAM(S): at 21:16

## 2019-03-30 RX ADMIN — QUETIAPINE FUMARATE 100 MILLIGRAM(S): 200 TABLET, FILM COATED ORAL at 21:23

## 2019-03-30 RX ADMIN — FAMOTIDINE 20 MILLIGRAM(S): 10 INJECTION INTRAVENOUS at 08:33

## 2019-03-31 RX ADMIN — Medication 1 MILLIGRAM(S): at 21:29

## 2019-03-31 RX ADMIN — Medication 25 MILLIGRAM(S): at 04:36

## 2019-03-31 RX ADMIN — Medication 25 MILLIGRAM(S): at 13:10

## 2019-03-31 RX ADMIN — Medication 1 MILLIGRAM(S): at 09:38

## 2019-03-31 RX ADMIN — FAMOTIDINE 20 MILLIGRAM(S): 10 INJECTION INTRAVENOUS at 09:38

## 2019-03-31 RX ADMIN — SERTRALINE 75 MILLIGRAM(S): 25 TABLET, FILM COATED ORAL at 09:38

## 2019-03-31 RX ADMIN — QUETIAPINE FUMARATE 100 MILLIGRAM(S): 200 TABLET, FILM COATED ORAL at 21:30

## 2019-03-31 RX ADMIN — LAMOTRIGINE 150 MILLIGRAM(S): 25 TABLET, ORALLY DISINTEGRATING ORAL at 09:38

## 2019-03-31 RX ADMIN — LAMOTRIGINE 150 MILLIGRAM(S): 25 TABLET, ORALLY DISINTEGRATING ORAL at 21:29

## 2019-03-31 RX ADMIN — FAMOTIDINE 20 MILLIGRAM(S): 10 INJECTION INTRAVENOUS at 21:29

## 2019-04-01 PROCEDURE — 99232 SBSQ HOSP IP/OBS MODERATE 35: CPT

## 2019-04-01 RX ORDER — TRAZODONE HCL 50 MG
50 TABLET ORAL AT BEDTIME
Qty: 0 | Refills: 0 | Status: DISCONTINUED | OUTPATIENT
Start: 2019-04-01 | End: 2019-04-02

## 2019-04-01 RX ADMIN — Medication 25 MILLIGRAM(S): at 18:42

## 2019-04-01 RX ADMIN — FAMOTIDINE 20 MILLIGRAM(S): 10 INJECTION INTRAVENOUS at 08:42

## 2019-04-01 RX ADMIN — Medication 1 MILLIGRAM(S): at 21:03

## 2019-04-01 RX ADMIN — FAMOTIDINE 20 MILLIGRAM(S): 10 INJECTION INTRAVENOUS at 21:02

## 2019-04-01 RX ADMIN — Medication 25 MILLIGRAM(S): at 14:44

## 2019-04-01 RX ADMIN — LAMOTRIGINE 150 MILLIGRAM(S): 25 TABLET, ORALLY DISINTEGRATING ORAL at 21:02

## 2019-04-01 RX ADMIN — SERTRALINE 75 MILLIGRAM(S): 25 TABLET, FILM COATED ORAL at 08:42

## 2019-04-01 RX ADMIN — LAMOTRIGINE 150 MILLIGRAM(S): 25 TABLET, ORALLY DISINTEGRATING ORAL at 08:42

## 2019-04-01 RX ADMIN — Medication 1 MILLIGRAM(S): at 08:41

## 2019-04-01 RX ADMIN — Medication 50 MILLIGRAM(S): at 21:02

## 2019-04-02 PROCEDURE — 99232 SBSQ HOSP IP/OBS MODERATE 35: CPT

## 2019-04-02 RX ORDER — TRAZODONE HCL 50 MG
1.5 TABLET ORAL
Qty: 21 | Refills: 0
Start: 2019-04-02 | End: 2019-04-15

## 2019-04-02 RX ORDER — TRAZODONE HCL 50 MG
75 TABLET ORAL AT BEDTIME
Qty: 0 | Refills: 0 | Status: DISCONTINUED | OUTPATIENT
Start: 2019-04-02 | End: 2019-04-03

## 2019-04-02 RX ORDER — DIPHENHYDRAMINE HCL 50 MG
1 CAPSULE ORAL
Qty: 14 | Refills: 0
Start: 2019-04-02 | End: 2019-04-15

## 2019-04-02 RX ORDER — LAMOTRIGINE 25 MG/1
1 TABLET, ORALLY DISINTEGRATING ORAL
Qty: 28 | Refills: 0
Start: 2019-04-02 | End: 2019-04-15

## 2019-04-02 RX ORDER — CLONAZEPAM 1 MG
1 TABLET ORAL
Qty: 28 | Refills: 0
Start: 2019-04-02 | End: 2019-04-15

## 2019-04-02 RX ADMIN — Medication 25 MILLIGRAM(S): at 04:00

## 2019-04-02 RX ADMIN — Medication 75 MILLIGRAM(S): at 21:41

## 2019-04-02 RX ADMIN — SERTRALINE 75 MILLIGRAM(S): 25 TABLET, FILM COATED ORAL at 08:48

## 2019-04-02 RX ADMIN — Medication 25 MILLIGRAM(S): at 14:44

## 2019-04-02 RX ADMIN — FAMOTIDINE 20 MILLIGRAM(S): 10 INJECTION INTRAVENOUS at 21:41

## 2019-04-02 RX ADMIN — FAMOTIDINE 20 MILLIGRAM(S): 10 INJECTION INTRAVENOUS at 08:48

## 2019-04-02 RX ADMIN — LAMOTRIGINE 150 MILLIGRAM(S): 25 TABLET, ORALLY DISINTEGRATING ORAL at 08:48

## 2019-04-02 RX ADMIN — LAMOTRIGINE 150 MILLIGRAM(S): 25 TABLET, ORALLY DISINTEGRATING ORAL at 21:42

## 2019-04-02 RX ADMIN — Medication 1 MILLIGRAM(S): at 08:48

## 2019-04-02 RX ADMIN — Medication 1 MILLIGRAM(S): at 21:42

## 2019-04-03 VITALS — RESPIRATION RATE: 18 BRPM | TEMPERATURE: 98 F

## 2019-04-03 PROCEDURE — 99232 SBSQ HOSP IP/OBS MODERATE 35: CPT

## 2019-04-03 PROCEDURE — 90837 PSYTX W PT 60 MINUTES: CPT

## 2019-04-03 RX ORDER — SERTRALINE 25 MG/1
3 TABLET, FILM COATED ORAL
Qty: 42 | Refills: 0
Start: 2019-04-03 | End: 2019-04-16

## 2019-04-03 RX ORDER — HYDROXYZINE HCL 10 MG
1 TABLET ORAL
Qty: 28 | Refills: 0
Start: 2019-04-03 | End: 2019-04-16

## 2019-04-03 RX ADMIN — FAMOTIDINE 20 MILLIGRAM(S): 10 INJECTION INTRAVENOUS at 09:31

## 2019-04-03 RX ADMIN — SERTRALINE 75 MILLIGRAM(S): 25 TABLET, FILM COATED ORAL at 09:31

## 2019-04-03 RX ADMIN — Medication 25 MILLIGRAM(S): at 13:01

## 2019-04-03 RX ADMIN — LAMOTRIGINE 150 MILLIGRAM(S): 25 TABLET, ORALLY DISINTEGRATING ORAL at 09:31

## 2019-04-03 RX ADMIN — Medication 1 MILLIGRAM(S): at 09:31

## 2019-04-03 RX ADMIN — Medication 50 MILLIGRAM(S): at 04:57

## 2019-04-04 ENCOUNTER — OUTPATIENT (OUTPATIENT)
Dept: OUTPATIENT SERVICES | Facility: HOSPITAL | Age: 44
LOS: 1 days | Discharge: HOME | End: 2019-04-04
Payer: MEDICARE

## 2019-04-05 DIAGNOSIS — F39 UNSPECIFIED MOOD [AFFECTIVE] DISORDER: ICD-10-CM

## 2019-04-05 DIAGNOSIS — F60.3 BORDERLINE PERSONALITY DISORDER: ICD-10-CM

## 2019-04-08 PROCEDURE — 99214 OFFICE O/P EST MOD 30 MIN: CPT

## 2019-04-10 ENCOUNTER — TRANSCRIPTION ENCOUNTER (OUTPATIENT)
Age: 44
End: 2019-04-10

## 2019-04-15 PROCEDURE — 99214 OFFICE O/P EST MOD 30 MIN: CPT

## 2019-04-17 ENCOUNTER — APPOINTMENT (OUTPATIENT)
Dept: INTERNAL MEDICINE | Facility: CLINIC | Age: 44
End: 2019-04-17
Payer: MEDICARE

## 2019-04-17 VITALS
DIASTOLIC BLOOD PRESSURE: 80 MMHG | BODY MASS INDEX: 27 KG/M2 | RESPIRATION RATE: 18 BRPM | HEART RATE: 100 BPM | WEIGHT: 172 LBS | OXYGEN SATURATION: 98 % | HEIGHT: 67 IN | SYSTOLIC BLOOD PRESSURE: 116 MMHG | TEMPERATURE: 98.2 F

## 2019-04-17 DIAGNOSIS — Z00.00 ENCOUNTER FOR GENERAL ADULT MEDICAL EXAMINATION W/OUT ABNORMAL FINDINGS: ICD-10-CM

## 2019-04-17 DIAGNOSIS — F31.9 BIPOLAR DISORDER, UNSPECIFIED: ICD-10-CM

## 2019-04-17 DIAGNOSIS — F43.10 POST-TRAUMATIC STRESS DISORDER, UNSPECIFIED: ICD-10-CM

## 2019-04-17 DIAGNOSIS — K21.9 GASTRO-ESOPHAGEAL REFLUX DISEASE W/OUT ESOPHAGITIS: ICD-10-CM

## 2019-04-17 DIAGNOSIS — H61.23 IMPACTED CERUMEN, BILATERAL: ICD-10-CM

## 2019-04-17 DIAGNOSIS — R68.89 OTHER GENERAL SYMPTOMS AND SIGNS: ICD-10-CM

## 2019-04-17 DIAGNOSIS — J06.9 ACUTE UPPER RESPIRATORY INFECTION, UNSPECIFIED: ICD-10-CM

## 2019-04-17 DIAGNOSIS — A60.09 HERPESVIRAL INFECTION OF OTHER UROGENITAL TRACT: ICD-10-CM

## 2019-04-17 PROCEDURE — G0439: CPT

## 2019-04-17 PROCEDURE — 99497 ADVNCD CARE PLAN 30 MIN: CPT | Mod: 25

## 2019-04-17 PROCEDURE — 36415 COLL VENOUS BLD VENIPUNCTURE: CPT

## 2019-04-17 RX ORDER — AMOXICILLIN AND CLAVULANATE POTASSIUM 500; 125 MG/1; MG/1
500-125 TABLET, FILM COATED ORAL
Qty: 20 | Refills: 0 | Status: DISCONTINUED | COMMUNITY
Start: 2019-01-22 | End: 2019-04-17

## 2019-04-17 RX ORDER — LAMOTRIGINE 200 MG/1
200 TABLET ORAL TWICE DAILY
Refills: 0 | Status: ACTIVE | COMMUNITY

## 2019-04-17 RX ORDER — FAMCICLOVIR 500 MG/1
500 TABLET, FILM COATED ORAL
Qty: 2 | Refills: 0 | Status: DISCONTINUED | COMMUNITY
Start: 2017-12-28 | End: 2019-04-17

## 2019-04-17 RX ORDER — DEXTROMETHORPHAN HBR, GUAIFENESIN 20; 400 MG/20ML; MG/20ML
20-400 SOLUTION ORAL EVERY 8 HOURS
Qty: 1 | Refills: 0 | Status: DISCONTINUED | COMMUNITY
Start: 2019-01-22 | End: 2019-04-17

## 2019-04-17 RX ORDER — LAMOTRIGINE 150 MG/1
150 TABLET ORAL TWICE DAILY
Refills: 0 | Status: DISCONTINUED | COMMUNITY
End: 2019-04-17

## 2019-04-17 RX ORDER — NAPROXEN 500 MG/1
500 TABLET ORAL
Qty: 60 | Refills: 0 | Status: DISCONTINUED | COMMUNITY
Start: 2017-11-27 | End: 2019-04-17

## 2019-04-17 RX ORDER — METRONIDAZOLE 7.5 MG/G
0.75 GEL VAGINAL
Qty: 70 | Refills: 0 | Status: DISCONTINUED | COMMUNITY
Start: 2017-09-22 | End: 2019-04-17

## 2019-04-17 RX ORDER — QUETIAPINE FUMARATE 50 MG/1
50 TABLET ORAL
Refills: 0 | Status: DISCONTINUED | COMMUNITY
End: 2019-04-17

## 2019-04-17 NOTE — HISTORY OF PRESENT ILLNESS
[FreeTextEntry1] : Patient presents for CPE.  [de-identified] : Patient presents for CPE. She reports ongoing left hip pain. She reports compliance with all prescribed medical therapy. At present, she reports ongoing suicidal thoughts, but no plans or means. Patient sees her therapist twice per week.

## 2019-04-17 NOTE — PHYSICAL EXAM
[No Acute Distress] : no acute distress [Well Nourished] : well nourished [Well Developed] : well developed [Well-Appearing] : well-appearing [EOMI] : extraocular movements intact [Normal Sclera/Conjunctiva] : normal sclera/conjunctiva [Normal Outer Ear/Nose] : the outer ears and nose were normal in appearance [No JVD] : no jugular venous distention [Supple] : supple [No Lymphadenopathy] : no lymphadenopathy [No Respiratory Distress] : no respiratory distress  [Clear to Auscultation] : lungs were clear to auscultation bilaterally [No Accessory Muscle Use] : no accessory muscle use [Normal Rate] : normal rate  [Regular Rhythm] : with a regular rhythm [Normal S1, S2] : normal S1 and S2 [No Edema] : there was no peripheral edema [Soft] : abdomen soft [No Masses] : no abdominal mass palpated [Non-distended] : non-distended [Non Tender] : non-tender [Normal Posterior Cervical Nodes] : no posterior cervical lymphadenopathy [Normal Supraclavicular Nodes] : no supraclavicular lymphadenopathy [Normal Bowel Sounds] : normal bowel sounds [Normal Anterior Cervical Nodes] : no anterior cervical lymphadenopathy [No Joint Swelling] : no joint swelling [No CVA Tenderness] : no CVA  tenderness [Coordination Grossly Intact] : coordination grossly intact [Normal Gait] : normal gait [Speech Grossly Normal] : speech grossly normal [No Focal Deficits] : no focal deficits [Memory Grossly Normal] : memory grossly normal [Normal Affect] : the affect was normal [Alert and Oriented x3] : oriented to person, place, and time [Normal Mood] : the mood was normal [Normal Insight/Judgement] : insight and judgment were intact

## 2019-04-17 NOTE — COUNSELING
[Weight management counseling provided] : Weight management [Activity counseling provided] : activity [Healthy eating counseling provided] : healthy eating [Target Wt Loss Goal ___] : Target weight loss goal [unfilled] lbs [Decrease Portions] : Decrease food portions [___ min/wk activity recommended] : [unfilled] min/wk activity recommended [Walking] : Walking [None] : None [Good understanding] : Patient has a good understanding of lifestyle changes and the steps needed to achieve self management goals

## 2019-04-17 NOTE — HEALTH RISK ASSESSMENT
[Good] : ~his/her~ current health as good [Fair] :  ~his/her~ mood as fair [Intercurrent hospitalizations] : was admitted to the hospital  [No falls in past year] : Patient reported no falls in the past year [0] : 1) Little interest or pleasure doing things: Not at all (0) [2] : 2) Feeling down, depressed, or hopeless for more than half of the days (2) [Unemployed] : unemployed [Single] : single [Feels Safe at Home] : Feels safe at home [] : No [de-identified] : 3/15/2019 [de-identified] : w [[id number]] : 2 [de-identified] : yes PSy [Patient reported PAP Smear was normal] : Patient reported PAP Smear was normal [Hepatitis C test offered] : Hepatitis C test offered [HIV Test offered] : HIV Test offered [None] : None [Fully functional (using the telephone, shopping, preparing meals, housekeeping, doing laundry, using] : Fully functional and needs no help or supervision to perform IADLs (using the telephone, shopping, preparing meals, housekeeping, doing laundry, using transportation, managing medications and managing finances) [Fully functional (bathing, dressing, toileting, transferring, walking, feeding)] : Fully functional (bathing, dressing, toileting, transferring, walking, feeding) [PapSmearDate] : 01/18 [AdvancecareDate] : 04/19 [Patient/Caregiver unclear of wishes] : Patient/Caregiver unclear of wishes

## 2019-04-18 ENCOUNTER — TRANSCRIPTION ENCOUNTER (OUTPATIENT)
Age: 44
End: 2019-04-18

## 2019-04-22 LAB
25(OH)D3 SERPL-MCNC: 20.7 NG/ML
ALBUMIN SERPL ELPH-MCNC: 4.5 G/DL
ALP BLD-CCNC: 96 U/L
ALT SERPL-CCNC: 11 U/L
ANION GAP SERPL CALC-SCNC: 16 MMOL/L
AST SERPL-CCNC: 15 U/L
BASOPHILS # BLD AUTO: 0.04 K/UL
BASOPHILS NFR BLD AUTO: 0.3 %
BILIRUB SERPL-MCNC: <0.2 MG/DL
BUN SERPL-MCNC: 10 MG/DL
CALCIUM SERPL-MCNC: 10.5 MG/DL
CHLORIDE SERPL-SCNC: 101 MMOL/L
CHOLEST SERPL-MCNC: 315 MG/DL
CHOLEST/HDLC SERPL: 4.1 RATIO
CO2 SERPL-SCNC: 22 MMOL/L
CREAT SERPL-MCNC: 0.89 MG/DL
EOSINOPHIL # BLD AUTO: 0.09 K/UL
EOSINOPHIL NFR BLD AUTO: 0.8 %
ESTIMATED AVERAGE GLUCOSE: 114 MG/DL
GLUCOSE SERPL-MCNC: 89 MG/DL
HBA1C MFR BLD HPLC: 5.6 %
HCT VFR BLD CALC: 46.2 %
HCV AB SER QL: NONREACTIVE
HCV S/CO RATIO: 0.12 S/CO
HDLC SERPL-MCNC: 77 MG/DL
HGB BLD-MCNC: 14.5 G/DL
HIV1+2 AB SPEC QL IA.RAPID: NONREACTIVE
IMM GRANULOCYTES NFR BLD AUTO: 0.5 %
LDLC SERPL CALC-MCNC: 198 MG/DL
LYMPHOCYTES # BLD AUTO: 2.28 K/UL
LYMPHOCYTES NFR BLD AUTO: 19.9 %
MAN DIFF?: NORMAL
MCHC RBC-ENTMCNC: 30.3 PG
MCHC RBC-ENTMCNC: 31.4 GM/DL
MCV RBC AUTO: 96.5 FL
MONOCYTES # BLD AUTO: 0.89 K/UL
MONOCYTES NFR BLD AUTO: 7.8 %
NEUTROPHILS # BLD AUTO: 8.1 K/UL
NEUTROPHILS NFR BLD AUTO: 70.7 %
PLATELET # BLD AUTO: 324 K/UL
POTASSIUM SERPL-SCNC: 4.6 MMOL/L
PROT SERPL-MCNC: 6.9 G/DL
RBC # BLD: 4.79 M/UL
RBC # FLD: 13 %
SODIUM SERPL-SCNC: 139 MMOL/L
TRIGL SERPL-MCNC: 201 MG/DL
TSH SERPL-ACNC: 1.22 UIU/ML
WBC # FLD AUTO: 11.46 K/UL

## 2019-04-27 ENCOUNTER — APPOINTMENT (OUTPATIENT)
Dept: INTERNAL MEDICINE | Facility: CLINIC | Age: 44
End: 2019-04-27
Payer: MEDICARE

## 2019-04-27 PROCEDURE — 36415 COLL VENOUS BLD VENIPUNCTURE: CPT

## 2019-04-30 ENCOUNTER — TRANSCRIPTION ENCOUNTER (OUTPATIENT)
Age: 44
End: 2019-04-30

## 2019-04-30 LAB
BASOPHILS # BLD AUTO: 0.03 K/UL
BASOPHILS NFR BLD AUTO: 0.3 %
EOSINOPHIL # BLD AUTO: 0.08 K/UL
EOSINOPHIL NFR BLD AUTO: 0.7 %
HCT VFR BLD CALC: 45.4 %
HGB BLD-MCNC: 14.6 G/DL
IMM GRANULOCYTES NFR BLD AUTO: 0.6 %
LYMPHOCYTES # BLD AUTO: 2.72 K/UL
LYMPHOCYTES NFR BLD AUTO: 25 %
MAN DIFF?: NORMAL
MCHC RBC-ENTMCNC: 30 PG
MCHC RBC-ENTMCNC: 32.2 GM/DL
MCV RBC AUTO: 93.4 FL
MONOCYTES # BLD AUTO: 0.81 K/UL
MONOCYTES NFR BLD AUTO: 7.4 %
NEUTROPHILS # BLD AUTO: 7.17 K/UL
NEUTROPHILS NFR BLD AUTO: 66 %
PLATELET # BLD AUTO: 319 K/UL
RBC # BLD: 4.86 M/UL
RBC # FLD: 12.4 %
WBC # FLD AUTO: 10.88 K/UL

## 2019-04-30 PROCEDURE — 99214 OFFICE O/P EST MOD 30 MIN: CPT

## 2019-05-14 ENCOUNTER — TRANSCRIPTION ENCOUNTER (OUTPATIENT)
Age: 44
End: 2019-05-14

## 2019-05-16 ENCOUNTER — APPOINTMENT (OUTPATIENT)
Dept: INTERNAL MEDICINE | Facility: CLINIC | Age: 44
End: 2019-05-16
Payer: MEDICARE

## 2019-05-16 VITALS — HEART RATE: 87 BPM | HEIGHT: 67 IN | TEMPERATURE: 98.5 F | OXYGEN SATURATION: 98 %

## 2019-05-16 VITALS — WEIGHT: 176 LBS | BODY MASS INDEX: 27.57 KG/M2

## 2019-05-16 PROCEDURE — 36415 COLL VENOUS BLD VENIPUNCTURE: CPT

## 2019-05-16 NOTE — REASON FOR VISIT
[Procedure: _________] : a [unfilled] procedure visit [FreeTextEntry1] : Patient presents for lab work only

## 2019-05-17 LAB
BASOPHILS # BLD AUTO: 0.02 K/UL
BASOPHILS NFR BLD AUTO: 0.2 %
EOSINOPHIL # BLD AUTO: 0.04 K/UL
EOSINOPHIL NFR BLD AUTO: 0.3 %
HCT VFR BLD CALC: 44.6 %
HGB BLD-MCNC: 14.5 G/DL
IMM GRANULOCYTES NFR BLD AUTO: 0.5 %
LYMPHOCYTES # BLD AUTO: 2.74 K/UL
LYMPHOCYTES NFR BLD AUTO: 23.3 %
MAN DIFF?: NORMAL
MCHC RBC-ENTMCNC: 30.2 PG
MCHC RBC-ENTMCNC: 32.5 GM/DL
MCV RBC AUTO: 92.9 FL
MONOCYTES # BLD AUTO: 0.86 K/UL
MONOCYTES NFR BLD AUTO: 7.3 %
NEUTROPHILS # BLD AUTO: 8.06 K/UL
NEUTROPHILS NFR BLD AUTO: 68.4 %
PLATELET # BLD AUTO: 300 K/UL
RBC # BLD: 4.8 M/UL
RBC # FLD: 12.7 %
WBC # FLD AUTO: 11.78 K/UL

## 2019-05-20 ENCOUNTER — TRANSCRIPTION ENCOUNTER (OUTPATIENT)
Age: 44
End: 2019-05-20

## 2019-05-21 ENCOUNTER — OUTPATIENT (OUTPATIENT)
Dept: OUTPATIENT SERVICES | Facility: HOSPITAL | Age: 44
LOS: 1 days | Discharge: HOME | End: 2019-05-21
Payer: MEDICARE

## 2019-05-22 DIAGNOSIS — F60.3 BORDERLINE PERSONALITY DISORDER: ICD-10-CM

## 2019-05-22 DIAGNOSIS — F39 UNSPECIFIED MOOD [AFFECTIVE] DISORDER: ICD-10-CM

## 2019-05-24 ENCOUNTER — CLINICAL ADVICE (OUTPATIENT)
Age: 44
End: 2019-05-24

## 2019-05-29 PROCEDURE — 99213 OFFICE O/P EST LOW 20 MIN: CPT

## 2019-05-31 ENCOUNTER — APPOINTMENT (OUTPATIENT)
Dept: INTERNAL MEDICINE | Facility: CLINIC | Age: 44
End: 2019-05-31
Payer: MEDICARE

## 2019-05-31 VITALS
WEIGHT: 176 LBS | HEIGHT: 67 IN | HEART RATE: 80 BPM | RESPIRATION RATE: 16 BRPM | OXYGEN SATURATION: 98 % | SYSTOLIC BLOOD PRESSURE: 114 MMHG | DIASTOLIC BLOOD PRESSURE: 72 MMHG | TEMPERATURE: 97.9 F | BODY MASS INDEX: 27.62 KG/M2

## 2019-05-31 PROCEDURE — 99213 OFFICE O/P EST LOW 20 MIN: CPT | Mod: 25

## 2019-05-31 PROCEDURE — 36415 COLL VENOUS BLD VENIPUNCTURE: CPT

## 2019-05-31 RX ORDER — SERTRALINE 25 MG/1
25 TABLET, FILM COATED ORAL
Qty: 42 | Refills: 0 | Status: DISCONTINUED | COMMUNITY
Start: 2019-04-03 | End: 2019-05-31

## 2019-05-31 RX ORDER — RISPERIDONE 0.25 MG/1
0.25 TABLET, FILM COATED ORAL
Qty: 15 | Refills: 0 | Status: DISCONTINUED | COMMUNITY
Start: 2019-04-23 | End: 2019-05-31

## 2019-05-31 RX ORDER — HYDROXYZINE HYDROCHLORIDE 25 MG/1
25 TABLET ORAL
Qty: 28 | Refills: 0 | Status: DISCONTINUED | COMMUNITY
Start: 2019-04-03 | End: 2019-05-31

## 2019-05-31 RX ORDER — TRAZODONE HYDROCHLORIDE 100 MG/1
100 TABLET ORAL
Qty: 90 | Refills: 0 | Status: DISCONTINUED | COMMUNITY
End: 2019-05-31

## 2019-05-31 RX ORDER — QUETIAPINE FUMARATE 25 MG/1
25 TABLET ORAL
Qty: 30 | Refills: 0 | Status: DISCONTINUED | COMMUNITY
Start: 2018-12-08 | End: 2019-05-31

## 2019-05-31 RX ORDER — VENLAFAXINE HYDROCHLORIDE 37.5 MG/1
37.5 CAPSULE, EXTENDED RELEASE ORAL
Qty: 30 | Refills: 0 | Status: DISCONTINUED | COMMUNITY
Start: 2019-01-08 | End: 2019-05-31

## 2019-05-31 NOTE — PHYSICAL EXAM
[No Acute Distress] : no acute distress [Well Nourished] : well nourished [Well Developed] : well developed [Well-Appearing] : well-appearing [Normal Sclera/Conjunctiva] : normal sclera/conjunctiva [PERRL] : pupils equal round and reactive to light [EOMI] : extraocular movements intact [Normal Outer Ear/Nose] : the outer ears and nose were normal in appearance [Normal Oropharynx] : the oropharynx was normal [No JVD] : no jugular venous distention [Supple] : supple [No Lymphadenopathy] : no lymphadenopathy [No Respiratory Distress] : no respiratory distress  [Clear to Auscultation] : lungs were clear to auscultation bilaterally [No Accessory Muscle Use] : no accessory muscle use [Normal Rate] : normal rate  [Regular Rhythm] : with a regular rhythm [Normal S1, S2] : normal S1 and S2 [No Murmur] : no murmur heard [No Edema] : there was no peripheral edema [Soft] : abdomen soft [Non Tender] : non-tender [Non-distended] : non-distended [Normal Bowel Sounds] : normal bowel sounds [Normal Supraclavicular Nodes] : no supraclavicular lymphadenopathy [Normal Posterior Cervical Nodes] : no posterior cervical lymphadenopathy [Normal Anterior Cervical Nodes] : no anterior cervical lymphadenopathy [Normal Gait] : normal gait [Coordination Grossly Intact] : coordination grossly intact [Speech Grossly Normal] : speech grossly normal [Memory Grossly Normal] : memory grossly normal [Normal Affect] : the affect was normal [Alert and Oriented x3] : oriented to person, place, and time [Normal Mood] : the mood was normal [Normal Insight/Judgement] : insight and judgment were intact

## 2019-05-31 NOTE — HEALTH RISK ASSESSMENT
[No falls in past year] : Patient reported no falls in the past year [0] : 1) Little interest or pleasure doing things: Not at all (0) [1] : 2) Feeling down, depressed, or hopeless for several days (1) [] : No [de-identified] : yes PSY [[id number]] : 1

## 2019-05-31 NOTE — HISTORY OF PRESENT ILLNESS
[FreeTextEntry1] : Follow up for chronic medical conditions  [de-identified] : Patient presents for follow up for her chronic medical conditions. She was noted to have a persistently elevated WBC. She reports cough, sneezing, and possible bacterial vaginosis infection. She reports subjective fevers. No UTI symptoms.

## 2019-06-03 LAB
BASOPHILS # BLD AUTO: 0.03 K/UL
BASOPHILS NFR BLD AUTO: 0.3 %
EOSINOPHIL # BLD AUTO: 0.02 K/UL
EOSINOPHIL NFR BLD AUTO: 0.2 %
HCT VFR BLD CALC: 45.8 %
HGB BLD-MCNC: 14.2 G/DL
IMM GRANULOCYTES NFR BLD AUTO: 0.4 %
LYMPHOCYTES # BLD AUTO: 2.34 K/UL
LYMPHOCYTES NFR BLD AUTO: 23.6 %
MAN DIFF?: NORMAL
MCHC RBC-ENTMCNC: 29.8 PG
MCHC RBC-ENTMCNC: 31 GM/DL
MCV RBC AUTO: 96.2 FL
MONOCYTES # BLD AUTO: 0.66 K/UL
MONOCYTES NFR BLD AUTO: 6.7 %
NEUTROPHILS # BLD AUTO: 6.83 K/UL
NEUTROPHILS NFR BLD AUTO: 68.8 %
PLATELET # BLD AUTO: 398 K/UL
RBC # BLD: 4.76 M/UL
RBC # FLD: 13.1 %
WBC # FLD AUTO: 9.92 K/UL

## 2019-07-02 PROCEDURE — 99213 OFFICE O/P EST LOW 20 MIN: CPT

## 2019-08-01 PROCEDURE — 99213 OFFICE O/P EST LOW 20 MIN: CPT

## 2019-09-04 PROCEDURE — 99213 OFFICE O/P EST LOW 20 MIN: CPT

## 2019-09-16 ENCOUNTER — APPOINTMENT (OUTPATIENT)
Dept: INTERNAL MEDICINE | Facility: CLINIC | Age: 44
End: 2019-09-16
Payer: MEDICARE

## 2019-09-16 VITALS
TEMPERATURE: 98.5 F | RESPIRATION RATE: 18 BRPM | WEIGHT: 170 LBS | SYSTOLIC BLOOD PRESSURE: 104 MMHG | HEIGHT: 67 IN | HEART RATE: 105 BPM | OXYGEN SATURATION: 98 % | BODY MASS INDEX: 26.68 KG/M2 | DIASTOLIC BLOOD PRESSURE: 64 MMHG

## 2019-09-16 DIAGNOSIS — T14.8XXA OTHER INJURY OF UNSPECIFIED BODY REGION, INITIAL ENCOUNTER: ICD-10-CM

## 2019-09-16 DIAGNOSIS — Z86.2 PERSONAL HISTORY OF DISEASES OF THE BLOOD AND BLOOD-FORMING ORGANS AND CERTAIN DISORDERS INVOLVING THE IMMUNE MECHANISM: ICD-10-CM

## 2019-09-16 DIAGNOSIS — M79.674 PAIN IN RIGHT TOE(S): ICD-10-CM

## 2019-09-16 PROCEDURE — 99213 OFFICE O/P EST LOW 20 MIN: CPT

## 2019-09-16 RX ORDER — IBUPROFEN 400 MG/1
400 TABLET, FILM COATED ORAL
Qty: 30 | Refills: 0 | Status: DISCONTINUED | COMMUNITY
Start: 2019-02-01 | End: 2019-09-15

## 2019-09-16 RX ORDER — EPINEPHRINE 0.3 MG/.3ML
0.3 INJECTION INTRAMUSCULAR
Qty: 2 | Refills: 0 | Status: DISCONTINUED | COMMUNITY
Start: 2017-05-15 | End: 2019-09-15

## 2019-09-16 RX ORDER — ARIPIPRAZOLE 5 MG/1
5 TABLET ORAL
Refills: 0 | Status: DISCONTINUED | COMMUNITY
End: 2019-07-16

## 2019-09-16 NOTE — HISTORY OF PRESENT ILLNESS
[de-identified] : Patient presents for evaluation of right first toe pain. Symptoms started 1 week ago and has been improving since. She states that the symptoms are in the medial aspect of the right foot. \par \par She also reports coughing with deep inhalation.

## 2019-09-16 NOTE — HEALTH RISK ASSESSMENT
[Yes] : Yes [Monthly or less (1 pt)] : Monthly or less (1 point) [1 or 2 (0 pts)] : 1 or 2 (0 points) [Never (0 pts)] : Never (0 points) [No] : In the past 12 months have you used drugs other than those required for medical reasons? No [No falls in past year] : Patient reported no falls in the past year [Patient not ambulatory] : Patient is not ambulatory [0] : 2) Feeling down, depressed, or hopeless: Not at all (0) [] : No [de-identified] : yes ENT and psy  [Audit-CScore] : 1 [[id number]] : 0

## 2019-09-16 NOTE — PLAN
[FreeTextEntry1] : - Symptomatic treatment for now\par - Short interval follow up if symptoms do not improve.

## 2019-09-22 ENCOUNTER — RX RENEWAL (OUTPATIENT)
Age: 44
End: 2019-09-22

## 2019-10-07 PROCEDURE — 99213 OFFICE O/P EST LOW 20 MIN: CPT

## 2019-10-21 NOTE — ED ADULT NURSE NOTE - NS ED NOTE  TALK SOMEONE YN
A Medicare Annual Wellness Visit and questionnaire was performed today  The visit included a review of your health habits, risk factors and age/risk appropriate screening tests/immunizations that you may be due for  This visit is not a substitute for follow-up visits for chronic medical conditions or evaluation of acute problems  Since the Annual Wellness health review is covered only once every 365 days, your next one should not occur before then  SHINGRIX is the new, more effective vaccine for Shingles  It is more than 90% effective  You should check with your local pharmacy and insurance company for availability and coverage  It is a 2 shot series, with the second shot given between 2-6 months after the first, and is approved for ages 48 and up  Medicare Preventive Visit Patient Instructions  Thank you for completing your Welcome to Medicare Visit or Medicare Annual Wellness Visit today  Your next wellness visit will be due in one year (10/21/2020)  The screening/preventive services that you may require over the next 5-10 years are detailed below  Some tests may not apply to you based off risk factors and/or age  Screening tests ordered at today's visit but not completed yet may show as past due  Also, please note that scanned in results may not display below  Preventive Screenings:  Service Recommendations Previous Testing/Comments   Colorectal Cancer Screening  · Colonoscopy    · Fecal Occult Blood Test (FOBT)/Fecal Immunochemical Test (FIT)  · Fecal DNA/Cologuard Test  · Flexible Sigmoidoscopy Age: 54-65 years old   Colonoscopy: every 10 years (May be performed more frequently if at higher risk)  OR  FOBT/FIT: every 1 year  OR  Cologuard: every 3 years  OR  Sigmoidoscopy: every 5 years  Screening may be recommended earlier than age 48 if at higher risk for colorectal cancer   Also, an individualized decision between you and your healthcare provider will decide whether screening between the ages of 76-85 would be appropriate  Colonoscopy: 12/08/2017  FOBT/FIT: Not on file  Cologuard: Not on file  Sigmoidoscopy: Not on file    Screening Current     Prostate Cancer Screening Individualized decision between patient and health care provider in men between ages of 53-78   Medicare will cover every 12 months beginning on the day after your 50th birthday PSA: 3 4 ng/mL     Screening Current     Hepatitis C Screening Once for adults born between 1945 and 1965  More frequently in patients at high risk for Hepatitis C Hep C Antibody: Not on file    Screening Not Indicated   Diabetes Screening 1-2 times per year if you're at risk for diabetes or have pre-diabetes Fasting glucose: No results in last 5 years   A1C: 6 0 %    Screening Current   Cholesterol Screening Once every 5 years if you don't have a lipid disorder  May order more often based on risk factors  Lipid panel: 10/11/2019    Screening Not Indicated  History Lipid Disorder      Other Preventive Screenings Covered by Medicare:  1  Abdominal Aortic Aneurysm (AAA) Screening: covered once if your at risk  You're considered to be at risk if you have a family history of AAA or a male between the age of 73-68 who smoking at least 100 cigarettes in your lifetime  2  Lung Cancer Screening: covers low dose CT scan once per year if you meet all of the following conditions: (1) Age 50-69; (2) No signs or symptoms of lung cancer; (3) Current smoker or have quit smoking within the last 15 years; (4) You have a tobacco smoking history of at least 30 pack years (packs per day x number of years you smoked); (5) You get a written order from a healthcare provider  3  Glaucoma Screening: covered annually if you're considered high risk: (1) You have diabetes OR (2) Family history of glaucoma OR (3)  aged 48 and older OR (3)  American aged 72 and older  3   Osteoporosis Screening: covered every 2 years if you meet one of the following conditions: (1) Have a vertebral abnormality; (2) On glucocorticoid therapy for more than 3 months; (3) Have primary hyperparathyroidism; (4) On osteoporosis medications and need to assess response to drug therapy  5  HIV Screening: covered annually if you're between the age of 12-76  Also covered annually if you are younger than 13 and older than 72 with risk factors for HIV infection  For pregnant patients, it is covered up to 3 times per pregnancy  Immunizations:  Immunization Recommendations   Influenza Vaccine Annual influenza vaccination during flu season is recommended for all persons aged >= 6 months who do not have contraindications   Pneumococcal Vaccine (Prevnar and Pneumovax)  * Prevnar = PCV13  * Pneumovax = PPSV23 Adults 25-60 years old: 1-3 doses may be recommended based on certain risk factors  Adults 72 years old: Prevnar (PCV13) vaccine recommended followed by Pneumovax (PPSV23) vaccine  If already received PPSV23 since turning 65, then PCV13 recommended at least one year after PPSV23 dose  Hepatitis B Vaccine 3 dose series if at intermediate or high risk (ex: diabetes, end stage renal disease, liver disease)   Tetanus (Td) Vaccine - COST NOT COVERED BY MEDICARE PART B Following completion of primary series, a booster dose should be given every 10 years to maintain immunity against tetanus  Td may also be given as tetanus wound prophylaxis  Tdap Vaccine - COST NOT COVERED BY MEDICARE PART B Recommended at least once for all adults  For pregnant patients, recommended with each pregnancy     Shingles Vaccine (Shingrix) - COST NOT COVERED BY MEDICARE PART B  2 shot series recommended in those aged 48 and above     Health Maintenance Due:      Topic Date Due    Hepatitis C Screening  1951    CRC Screening: Colonoscopy  12/08/2027     Immunizations Due:      Topic Date Due    DTaP,Tdap,and Td Vaccines (1 - Tdap) 02/11/1972    Pneumococcal Vaccine: 65+ Years (1 of 2 - PCV13) 02/11/2016    INFLUENZA VACCINE 07/01/2019     Advance Directives   What are advance directives? Advance directives are legal documents that state your wishes and plans for medical care  These plans are made ahead of time in case you lose your ability to make decisions for yourself  Advance directives can apply to any medical decision, such as the treatments you want, and if you want to donate organs  What are the types of advance directives? There are many types of advance directives, and each state has rules about how to use them  You may choose a combination of any of the following:  · Living will: This is a written record of the treatment you want  You can also choose which treatments you do not want, which to limit, and which to stop at a certain time  This includes surgery, medicine, IV fluid, and tube feedings  · Durable power of  for healthcare [facility] This is a written record that states who you want to make healthcare choices for you when you are unable to make them for yourself  This person, called a proxy, is usually a family member or a friend  You may choose more than 1 proxy  · Do not resuscitate (DNR) order:  A DNR order is used in case your heart stops beating or you stop breathing  It is a request not to have certain forms of treatment, such as CPR  A DNR order may be included in other types of advance directives  · Medical directive: This covers the care that you want if you are in a coma, near death, or unable to make decisions for yourself  You can list the treatments you want for each condition  Treatment may include pain medicine, surgery, blood transfusions, dialysis, IV or tube feedings, and a ventilator (breathing machine)  · Values history: This document has questions about your views, beliefs, and how you feel and think about life  This information can help others choose the care that you would choose  Why are advance directives important? An advance directive helps you control your care  Although spoken wishes may be used, it is better to have your wishes written down  Spoken wishes can be misunderstood, or not followed  Treatments may be given even if you do not want them  An advance directive may make it easier for your family to make difficult choices about your care  Weight Management   Why it is important to manage your weight:  Being overweight increases your risk of health conditions such as heart disease, high blood pressure, type 2 diabetes, and certain types of cancer  It can also increase your risk for osteoarthritis, sleep apnea, and other respiratory problems  Aim for a slow, steady weight loss  Even a small amount of weight loss can lower your risk of health problems  How to lose weight safely:  A safe and healthy way to lose weight is to eat fewer calories and get regular exercise  You can lose up about 1 pound a week by decreasing the number of calories you eat by 500 calories each day  Healthy meal plan for weight management:  A healthy meal plan includes a variety of foods, contains fewer calories, and helps you stay healthy  A healthy meal plan includes the following:  · Eat whole-grain foods more often  A healthy meal plan should contain fiber  Fiber is the part of grains, fruits, and vegetables that is not broken down by your body  Whole-grain foods are healthy and provide extra fiber in your diet  Some examples of whole-grain foods are whole-wheat breads and pastas, oatmeal, brown rice, and bulgur  · Eat a variety of vegetables every day  Include dark, leafy greens such as spinach, kale, sergey greens, and mustard greens  Eat yellow and orange vegetables such as carrots, sweet potatoes, and winter squash  · Eat a variety of fruits every day  Choose fresh or canned fruit (canned in its own juice or light syrup) instead of juice  Fruit juice has very little or no fiber  · Eat low-fat dairy foods  Drink fat-free (skim) milk or 1% milk   Eat fat-free yogurt and low-fat cottage cheese  Try low-fat cheeses such as mozzarella and other reduced-fat cheeses  · Choose meat and other protein foods that are low in fat  Choose beans or other legumes such as split peas or lentils  Choose fish, skinless poultry (chicken or turkey), or lean cuts of red meat (beef or pork)  Before you cook meat or poultry, cut off any visible fat  · Use less fat and oil  Try baking foods instead of frying them  Add less fat, such as margarine, sour cream, regular salad dressing and mayonnaise to foods  Eat fewer high-fat foods  Some examples of high-fat foods include french fries, doughnuts, ice cream, and cakes  · Eat fewer sweets  Limit foods and drinks that are high in sugar  This includes candy, cookies, regular soda, and sweetened drinks  Exercise:  Exercise at least 30 minutes per day on most days of the week  Some examples of exercise include walking, biking, dancing, and swimming  You can also fit in more physical activity by taking the stairs instead of the elevator or parking farther away from stores  Ask your healthcare provider about the best exercise plan for you  © Copyright [employer] 2018 Information is for End User's use only and may not be sold, redistributed or otherwise used for commercial purposes   All illustrations and images included in CareNotes® are the copyrighted property of A D A M , Inc  or [address] [place] [address] no

## 2019-10-22 ENCOUNTER — MEDICATION RENEWAL (OUTPATIENT)
Age: 44
End: 2019-10-22

## 2019-11-07 PROCEDURE — 99214 OFFICE O/P EST MOD 30 MIN: CPT

## 2019-12-13 PROCEDURE — 99214 OFFICE O/P EST MOD 30 MIN: CPT

## 2019-12-24 ENCOUNTER — OUTPATIENT (OUTPATIENT)
Dept: OUTPATIENT SERVICES | Facility: HOSPITAL | Age: 44
LOS: 1 days | Discharge: HOME | End: 2019-12-24
Payer: MEDICARE

## 2019-12-31 DIAGNOSIS — F39 UNSPECIFIED MOOD [AFFECTIVE] DISORDER: ICD-10-CM

## 2020-01-03 NOTE — ED BEHAVIORAL HEALTH ASSESSMENT NOTE - PRIOR MEDICATION SIDE EFFECTS OR ADVERSE REACTIONS
Refill Request:  Adderall 15mg tablet   Take one tablet in the morning and 1 tablet in the afternoon. #60  Garrett Sutton  Has an appointment scheduled 1/16/20 for med check.  Last refill 12-6-19   #60    Takes for ADHD  Predominately unintentive type  Taking rx since 3-24-17       Yes

## 2020-02-10 PROCEDURE — 99214 OFFICE O/P EST MOD 30 MIN: CPT

## 2020-03-23 PROCEDURE — 99213 OFFICE O/P EST LOW 20 MIN: CPT

## 2020-04-17 PROCEDURE — 99213 OFFICE O/P EST LOW 20 MIN: CPT

## 2020-05-18 PROCEDURE — 99442: CPT | Mod: 95

## 2020-06-18 PROCEDURE — 99442: CPT | Mod: 95

## 2020-07-10 NOTE — ED BEHAVIORAL HEALTH ASSESSMENT NOTE - ORIENTED TO SITUATION
How Did The Hair Loss Occur?: gradual in onset How Severe Is Your Hair Loss?: mild Additional History: Patient washes her hair with Danette Shave and Mielle hair products. Yes

## 2020-07-16 PROCEDURE — 99443: CPT | Mod: 95

## 2020-07-18 ENCOUNTER — EMERGENCY (EMERGENCY)
Facility: HOSPITAL | Age: 45
LOS: 1 days | Discharge: HOME | End: 2020-07-18
Attending: EMERGENCY MEDICINE | Admitting: EMERGENCY MEDICINE
Payer: MEDICARE

## 2020-07-18 VITALS
TEMPERATURE: 98 F | RESPIRATION RATE: 16 BRPM | DIASTOLIC BLOOD PRESSURE: 91 MMHG | OXYGEN SATURATION: 96 % | HEART RATE: 83 BPM | SYSTOLIC BLOOD PRESSURE: 132 MMHG | HEIGHT: 67 IN | WEIGHT: 160.06 LBS

## 2020-07-18 LAB
APPEARANCE UR: CLEAR — SIGNIFICANT CHANGE UP
BILIRUB UR-MCNC: NEGATIVE — SIGNIFICANT CHANGE UP
BLOOD UR QL VISUAL: SIGNIFICANT CHANGE UP
COLOR SPEC: YELLOW — SIGNIFICANT CHANGE UP
GLUCOSE UR-MCNC: NEGATIVE — SIGNIFICANT CHANGE UP
KETONES UR-MCNC: NEGATIVE — SIGNIFICANT CHANGE UP
LEUKOCYTE ESTERASE UR-ACNC: NEGATIVE — SIGNIFICANT CHANGE UP
NITRITE UR-MCNC: NEGATIVE — SIGNIFICANT CHANGE UP
PH UR: 6 — SIGNIFICANT CHANGE UP (ref 5–8)
PROT UR-MCNC: NEGATIVE — SIGNIFICANT CHANGE UP
SP GR SPEC: 1.02 — SIGNIFICANT CHANGE UP (ref 1–1.04)
UROBILINOGEN FLD QL: NORMAL — SIGNIFICANT CHANGE UP

## 2020-07-18 PROCEDURE — 71045 X-RAY EXAM CHEST 1 VIEW: CPT | Mod: 26

## 2020-07-18 PROCEDURE — 93010 ELECTROCARDIOGRAM REPORT: CPT

## 2020-07-18 PROCEDURE — 99284 EMERGENCY DEPT VISIT MOD MDM: CPT | Mod: 25,GC

## 2020-07-18 RX ORDER — ACETAMINOPHEN 500 MG
650 TABLET ORAL ONCE
Refills: 0 | Status: COMPLETED | OUTPATIENT
Start: 2020-07-18 | End: 2020-07-18

## 2020-07-18 RX ADMIN — Medication 650 MILLIGRAM(S): at 09:48

## 2020-07-18 NOTE — ED PROVIDER NOTE - CLINICAL SUMMARY MEDICAL DECISION MAKING FREE TEXT BOX
Sue: pt concerned about her health. has intermittent sob (none now) intermittent HA (none now) diarrhea. Found to have ?cva pain. will check UA, give tylenol, and reassure. Nl vitals here

## 2020-07-18 NOTE — ED PROVIDER NOTE - ATTENDING CONTRIBUTION TO CARE
I performed a history and physical exam of the patient and discussed their management with the resident and /or advanced care provider. I reviewed the resident and /or ACP's note and agree with the documented findings and plan of care. My medical decision making and observations are found above.  Lungs clear, abd slight superpubic tenderness

## 2020-07-18 NOTE — ED PROVIDER NOTE - OBJECTIVE STATEMENT
44 y/o female with epilepsy, bipolar disorder and anxiety presents c/o nausea, subjective fever, shortness of breath, headache and "diarrhea" for one week. Diarrhea on further questioning is loose stool about 4-5 times a day. Shortness of breath and nausea began this morning. SOB was intermittent and pt did not experience it in the ED. Headache is 6/10 on left frontotemporal region. States this is not like any of her previous headaches. States her temp yesterday was 99.8. States she is due for her menstrual period. Denies chest pain, vomiting, cough, sick contacts, recent travel, abdominal pain.

## 2020-07-18 NOTE — ED PROVIDER NOTE - CARE PLAN
Principal Discharge DX:	Acute non intractable tension-type headache  Secondary Diagnosis:	Shortness of breath  Secondary Diagnosis:	Anxiety

## 2020-07-18 NOTE — ED ADULT TRIAGE NOTE - CHIEF COMPLAINT QUOTE
Pt brought in by ambulance from home for eval of multiple medical complaints. Pt states that has had "diarrhea" for one week with soft, loose, not watery stools. Pt denies abd pain or pressure. Pt also c/o headache since yesterday. States that she began feeling SOB this am with "fever". States that her normal temp is 96.7 and she was 99 since yesterday. No cough or chills.

## 2020-07-18 NOTE — PROVIDER CONTACT NOTE (OTHER) - ASSESSMENT
SW contacted by Clinical Provider, Pt is cleared for D/C requests Medicaid taxi transport Pt going home address on file confirmed. SW contacted [facility] [phone] spoke with worker Dickson who provided Conf. #[id number] with [employer] [phone], [facility] contacted taxi service, staff inform  will call on arrival to ER entrance. Clinical Provider is in agreement with Taxi [employer].  SW informed Pt and medical team. No further SW intervention required at this time.

## 2020-07-18 NOTE — ED PROVIDER NOTE - PATIENT PORTAL LINK FT
You can access the FollowMyHealth Patient Portal offered by [facility] by registering at the following website: [email or web address]. By joining [employer]’s FollowMyHealth portal, you will also be able to view your health information using other applications (apps) compatible with our system.

## 2020-07-18 NOTE — ED ADULT NURSE NOTE - OBJECTIVE STATEMENT
Patient received in intake room 1. alert and oriented x3, ambulatory. History of bipolar, depression, ptsd, seizures, high cholesterol. Presents to ED with multiple medical complaints- c/o loose stools, mild headache, intermittent sob, subjective fever. Denies cough, chest pain, nausea, vomiting. Vital signs stable. Afebrile. No swelling noted to extremities. Urinalysis sent as per MD orders. UCG negative. EKG completed. Appears comfortable and in no acute distress. Will monitor.

## 2020-07-18 NOTE — ED ADULT NURSE NOTE - NSIMPLEMENTINTERV_GEN_ALL_ED
Implemented All Universal Safety Interventions:  [address] to call system. Call bell, personal items and telephone within reach. Instruct patient to call for assistance. Room bathroom lighting operational. Non-slip footwear when patient is off stretcher. Physically safe environment: no spills, clutter or unnecessary equipment. Stretcher in lowest position, wheels locked, appropriate side rails in place.

## 2020-07-18 NOTE — ED PROVIDER NOTE - NSFOLLOWUPCLINICS_GEN_ALL_ED_FT
[facility] General Internal Medicine  General Internal Medicine  [address] [id number]  Phone: [phone]  Fax:   Follow Up Time: 1-3 Days

## 2020-07-18 NOTE — ED PROVIDER NOTE - NS ED ROS FT
CONSTITUTIONAL +subjective  fever, No diaphoresis, No weight change  EYES - No blurred vision  ENT -No sore throat, No neck pain, No rhinorrhea, No ear pain  RESPIRATORY + shortness of breath, No cough  CARDIAC -No chest pain, No palpitations  GI - No abdominal pain, +nausea, No vomiting, +diarrhea, No constipation, No bright red blood per rectum or melena. No flank pain  - No dysuria, frequency, hematuria.   MUSCULOSKELETAL - No joint paint, No swelling, No back pain  NEUROLOGIC - No numbness, No focal weakness, +headache, + dizziness

## 2020-07-18 NOTE — ED PROVIDER NOTE - NSFOLLOWUPINSTRUCTIONS_ED_ALL_ED_FT
(1) Follow up with your primary care physician as discussed. In addition, we did not find evidence of a life threatening illness on your testing here today, but listed below are the specialists that will be necessary to see as an outpatient to continue the workup.  Please call the numbers listed below or [phone] to set up the necessary appointments.  (2) Immediately seek care at your nearest emergency room if your symptoms worsen, persist, or do not resolve

## 2020-07-18 NOTE — ED PROVIDER NOTE - PHYSICAL EXAMINATION
CONSTITUTIONAL: Well-developed; well-nourished; in no acute distress.   SKIN: warm, dry  HEAD: Normocephalic; atraumatic.  EYES: no conjunctival injection.  ENT: No nasal discharge; airway clear.  NECK: Supple; non tender.  CARD: S1, S2 normal; no murmurs, gallops, or rubs. Regular rate and rhythm.   RESP: No wheezes, rales or rhonchi.  ABD: soft non-distended. Tenderness in suprapubic region. No guarding, rigidity or distention. Mild right sided CVA tenderness.   EXT: Normal ROM.    NEURO: Alert, oriented, grossly unremarkable  PSYCH: Cooperative, appropriate.

## 2020-07-22 ENCOUNTER — APPOINTMENT (OUTPATIENT)
Dept: INTERNAL MEDICINE | Facility: CLINIC | Age: 45
End: 2020-07-22
Payer: MEDICARE

## 2020-07-22 DIAGNOSIS — B34.9 VIRAL INFECTION, UNSPECIFIED: ICD-10-CM

## 2020-07-22 PROCEDURE — 99442: CPT | Mod: 95

## 2020-07-22 RX ORDER — FLUOXETINE HYDROCHLORIDE 10 MG/1
10 TABLET ORAL
Qty: 15 | Refills: 0 | Status: COMPLETED | COMMUNITY
Start: 2020-02-10

## 2020-07-22 RX ORDER — TRAZODONE HYDROCHLORIDE 50 MG/1
50 TABLET ORAL
Qty: 30 | Refills: 2 | Status: DISCONTINUED | COMMUNITY
Start: 2019-04-02 | End: 2020-07-21

## 2020-07-22 RX ORDER — CLONAZEPAM 0.5 MG/1
0.5 TABLET ORAL
Qty: 60 | Refills: 0 | Status: ACTIVE | COMMUNITY

## 2020-07-22 RX ORDER — FAMOTIDINE 20 MG/1
20 TABLET, FILM COATED ORAL
Qty: 180 | Refills: 1 | Status: DISCONTINUED | COMMUNITY
Start: 2018-02-01 | End: 2020-07-21

## 2020-07-22 RX ORDER — FLUOXETINE HYDROCHLORIDE 10 MG/1
10 CAPSULE ORAL DAILY
Refills: 0 | Status: DISCONTINUED | COMMUNITY
End: 2020-07-21

## 2020-08-20 PROCEDURE — 99442: CPT | Mod: 95

## 2020-09-30 PROCEDURE — 99442: CPT | Mod: 95

## 2020-11-12 PROCEDURE — 99442: CPT | Mod: 95

## 2020-12-22 PROCEDURE — 99442: CPT | Mod: 95

## 2021-01-04 PROCEDURE — 99214 OFFICE O/P EST MOD 30 MIN: CPT

## 2021-02-03 PROCEDURE — 99214 OFFICE O/P EST MOD 30 MIN: CPT | Mod: 95

## 2021-02-24 NOTE — ED ADULT NURSE NOTE - NS ED NOTE ABUSE RESPONSE YN
Lab Hours  Mon-Th    7 a.m. to 6 p.m.  Friday      7 a.m. to 5 p.m.  Saturday  7 a.m. to 12 p.m.           no

## 2021-03-25 PROCEDURE — 99214 OFFICE O/P EST MOD 30 MIN: CPT | Mod: 95

## 2021-04-06 ENCOUNTER — APPOINTMENT (OUTPATIENT)
Age: 46
End: 2021-04-06

## 2021-04-30 PROCEDURE — 99214 OFFICE O/P EST MOD 30 MIN: CPT | Mod: 95

## 2021-05-12 ENCOUNTER — EMERGENCY (EMERGENCY)
Facility: HOSPITAL | Age: 46
LOS: 1 days | Discharge: LEFT BEFORE DISCHARGE | End: 2021-05-12
Attending: EMERGENCY MEDICINE | Admitting: EMERGENCY MEDICINE
Payer: MEDICARE

## 2021-05-12 VITALS
SYSTOLIC BLOOD PRESSURE: 150 MMHG | RESPIRATION RATE: 17 BRPM | HEART RATE: 120 BPM | TEMPERATURE: 98 F | OXYGEN SATURATION: 100 % | DIASTOLIC BLOOD PRESSURE: 96 MMHG

## 2021-05-12 DIAGNOSIS — Z53.21 PROCEDURE AND TREATMENT NOT CARRIED OUT DUE TO PATIENT LEAVING PRIOR TO BEING SEEN BY HEALTH CARE PROVIDER: ICD-10-CM

## 2021-05-12 DIAGNOSIS — Z88.1 ALLERGY STATUS TO OTHER ANTIBIOTIC AGENTS STATUS: ICD-10-CM

## 2021-05-12 DIAGNOSIS — Z88.7 ALLERGY STATUS TO SERUM AND VACCINE: ICD-10-CM

## 2021-05-12 DIAGNOSIS — R00.2 PALPITATIONS: ICD-10-CM

## 2021-05-12 DIAGNOSIS — Z88.5 ALLERGY STATUS TO NARCOTIC AGENT: ICD-10-CM

## 2021-05-12 PROCEDURE — L9991: CPT

## 2021-05-12 PROCEDURE — 93005 ELECTROCARDIOGRAM TRACING: CPT

## 2021-05-12 PROCEDURE — 99283 EMERGENCY DEPT VISIT LOW MDM: CPT

## 2021-05-12 NOTE — ED ADULT NURSE NOTE - OBJECTIVE STATEMENT
Patient reports on/ off palpitation anxiety S/P smoking medical marijuana today, also reports neck pain after taking 2nd covid vaccine om March1,2021, no dizziness, blurry vision, headache noted.

## 2021-05-12 NOTE — ED PROVIDER NOTE - PROGRESS NOTE DETAILS
Called Pt on listed phone number and she answered.  I explained that we wanted to evaluate her and she could return but she declines and wants to go home.

## 2021-05-12 NOTE — ED ADULT NURSE NOTE - EXPLANATION OF PATIENT'S REASON FOR LEAVING
stated wait time is too long cannot wait for MD evaluation, MD Quinn made aware and explain to Patient that He's coming to evaluate Patient soon, however patient walked out, gait steady not in apparent distress .

## 2021-05-17 ENCOUNTER — EMERGENCY (EMERGENCY)
Facility: HOSPITAL | Age: 46
LOS: 1 days | Discharge: HOME | End: 2021-05-17
Attending: EMERGENCY MEDICINE | Admitting: EMERGENCY MEDICINE
Payer: MEDICARE

## 2021-05-17 VITALS
RESPIRATION RATE: 18 BRPM | OXYGEN SATURATION: 98 % | HEART RATE: 78 BPM | SYSTOLIC BLOOD PRESSURE: 118 MMHG | DIASTOLIC BLOOD PRESSURE: 78 MMHG | TEMPERATURE: 98 F

## 2021-05-17 VITALS
RESPIRATION RATE: 16 BRPM | DIASTOLIC BLOOD PRESSURE: 91 MMHG | TEMPERATURE: 98 F | SYSTOLIC BLOOD PRESSURE: 150 MMHG | HEIGHT: 67 IN | HEART RATE: 53 BPM | OXYGEN SATURATION: 98 %

## 2021-05-17 LAB
ALBUMIN SERPL ELPH-MCNC: 4.7 G/DL — SIGNIFICANT CHANGE UP (ref 3.3–5)
ALP SERPL-CCNC: 81 U/L — SIGNIFICANT CHANGE UP (ref 40–120)
ALT FLD-CCNC: 9 U/L — SIGNIFICANT CHANGE UP (ref 4–33)
ANION GAP SERPL CALC-SCNC: 11 MMOL/L — SIGNIFICANT CHANGE UP (ref 7–14)
AST SERPL-CCNC: 14 U/L — SIGNIFICANT CHANGE UP (ref 4–32)
BASOPHILS # BLD AUTO: 0.01 K/UL — SIGNIFICANT CHANGE UP (ref 0–0.2)
BASOPHILS NFR BLD AUTO: 0.1 % — SIGNIFICANT CHANGE UP (ref 0–2)
BILIRUB SERPL-MCNC: 0.4 MG/DL — SIGNIFICANT CHANGE UP (ref 0.2–1.2)
BUN SERPL-MCNC: 16 MG/DL — SIGNIFICANT CHANGE UP (ref 7–23)
CALCIUM SERPL-MCNC: 10.7 MG/DL — HIGH (ref 8.4–10.5)
CHLORIDE SERPL-SCNC: 101 MMOL/L — SIGNIFICANT CHANGE UP (ref 98–107)
CO2 SERPL-SCNC: 24 MMOL/L — SIGNIFICANT CHANGE UP (ref 22–31)
CREAT SERPL-MCNC: 1.05 MG/DL — SIGNIFICANT CHANGE UP (ref 0.5–1.3)
EOSINOPHIL # BLD AUTO: 0 K/UL — SIGNIFICANT CHANGE UP (ref 0–0.5)
EOSINOPHIL NFR BLD AUTO: 0 % — SIGNIFICANT CHANGE UP (ref 0–6)
GLUCOSE SERPL-MCNC: 117 MG/DL — HIGH (ref 70–99)
HCT VFR BLD CALC: 44.9 % — SIGNIFICANT CHANGE UP (ref 34.5–45)
HGB BLD-MCNC: 15.2 G/DL — SIGNIFICANT CHANGE UP (ref 11.5–15.5)
IANC: 5.36 K/UL — SIGNIFICANT CHANGE UP (ref 1.5–8.5)
IMM GRANULOCYTES NFR BLD AUTO: 0.4 % — SIGNIFICANT CHANGE UP (ref 0–1.5)
LYMPHOCYTES # BLD AUTO: 2.69 K/UL — SIGNIFICANT CHANGE UP (ref 1–3.3)
LYMPHOCYTES # BLD AUTO: 30 % — SIGNIFICANT CHANGE UP (ref 13–44)
MCHC RBC-ENTMCNC: 30 PG — SIGNIFICANT CHANGE UP (ref 27–34)
MCHC RBC-ENTMCNC: 33.9 GM/DL — SIGNIFICANT CHANGE UP (ref 32–36)
MCV RBC AUTO: 88.7 FL — SIGNIFICANT CHANGE UP (ref 80–100)
MONOCYTES # BLD AUTO: 0.87 K/UL — SIGNIFICANT CHANGE UP (ref 0–0.9)
MONOCYTES NFR BLD AUTO: 9.7 % — SIGNIFICANT CHANGE UP (ref 2–14)
NEUTROPHILS # BLD AUTO: 5.36 K/UL — SIGNIFICANT CHANGE UP (ref 1.8–7.4)
NEUTROPHILS NFR BLD AUTO: 59.8 % — SIGNIFICANT CHANGE UP (ref 43–77)
NRBC # BLD: 0 /100 WBCS — SIGNIFICANT CHANGE UP
NRBC # FLD: 0 K/UL — SIGNIFICANT CHANGE UP
PLATELET # BLD AUTO: 378 K/UL — SIGNIFICANT CHANGE UP (ref 150–400)
POTASSIUM SERPL-MCNC: 3.8 MMOL/L — SIGNIFICANT CHANGE UP (ref 3.5–5.3)
POTASSIUM SERPL-SCNC: 3.8 MMOL/L — SIGNIFICANT CHANGE UP (ref 3.5–5.3)
PROT SERPL-MCNC: 7.7 G/DL — SIGNIFICANT CHANGE UP (ref 6–8.3)
RBC # BLD: 5.06 M/UL — SIGNIFICANT CHANGE UP (ref 3.8–5.2)
RBC # FLD: 12.2 % — SIGNIFICANT CHANGE UP (ref 10.3–14.5)
SODIUM SERPL-SCNC: 136 MMOL/L — SIGNIFICANT CHANGE UP (ref 135–145)
TROPONIN T, HIGH SENSITIVITY RESULT: <6 NG/L — SIGNIFICANT CHANGE UP
WBC # BLD: 8.97 K/UL — SIGNIFICANT CHANGE UP (ref 3.8–10.5)
WBC # FLD AUTO: 8.97 K/UL — SIGNIFICANT CHANGE UP (ref 3.8–10.5)

## 2021-05-17 PROCEDURE — 99285 EMERGENCY DEPT VISIT HI MDM: CPT | Mod: 25

## 2021-05-17 PROCEDURE — 93010 ELECTROCARDIOGRAM REPORT: CPT

## 2021-05-17 NOTE — ED ADULT NURSE NOTE - OBJECTIVE STATEMENT
Pt received to room 9 A&Ox4 and ambulatory. Pt C/O new onset of head, neck and right sided arm pain. Pt states she feels febrile at this time, afebrile in ED. Pt endorsing palpitations. Pt states "I took medical marijuana," new prescription for neck pain. PMHx epilepsy, BPD, endometrosis. Respirations even and unlabored. 20G IV placed in LAC and labs drawn as ordered. Remains on cardiac monitor. MD mann in progress.

## 2021-05-17 NOTE — ED ADULT TRIAGE NOTE - CHIEF COMPLAINT QUOTE
I had neck pain x 2 weeks, no trauma or injury. I have a fever 99.1. I feel its a fever because my body temp is usually 96.8. I was recently started on medical marijuana on wed- after I took the marijuana I felt my heart rate was very high. tonight, I have pain to my chest, feels like pressure, around 12:30AM, the pain lasted couple seconds and went away spontaneously I was listening to the music. Last used marjiuana around 11PM tonight. I been feeling palpitations on and off for the last 4 days ever since I started medical marijuana. PMH Lyme, epilepsy, endometriosis, bipolar disorder.

## 2021-05-17 NOTE — ED PROVIDER NOTE - OBJECTIVE STATEMENT
47 yo F with pmxh of HLD, epilepsy, psychogenic seizures, presents with 2hrs of intermittent chest pain started after she had THC lozenge. States she started taking lozenges for her neck pain 4ds ago, and has similar sxs after she takes it. Denies chest pain now, palpitations, n/v/d/abd pain/f/c. 45 yo F with h/o of Bipolar, HLD, epilepsy, psychogenic seizures, presents with 2hrs of intermittent chest pain started after she had THC lozenge. States she started taking lozenges for her neck pain 4ds ago, and has similar sxs after she takes it. Denies chest pain now, palpitations, n/v/d/abd pain/f/c.  Attributes neck pain to COVID vax which she received a few days ago.  No HA . No vision changes .No neck stiffness.  States she saw a "marijuana doctor" who rx'd the THC for her to tx her neck pain of last few days . Has not seen ortho/PT or taken NSAID or other OTC meds for the pain.

## 2021-05-17 NOTE — ED PROVIDER NOTE - ATTENDING CONTRIBUTION TO CARE
Attending Attestation: Dr. French  I have personally performed a history and physical examination of the patient and discussed management with the resident as well as the patient.  I reviewed the resident's note and agree with the documented findings and plan of care.  I have authored and modified critical sections of the Provider Note, including but not limited to HPI, Physical Exam and MDM. Pt c complex med and psych hx who p/w chest pain after taking THC lozenge for neck pain.  No neck pain at present.  Neuro intact. Chest pain now resolved. EKG - NSR. Will send labs to risk stratify for MACE (low risk pending trop, by HEART).  Low risk PE by [facility] and no specific s/s of PE. Declining pain medication for neck pain (suspect MSK pain, mild LEFT cervical paraspinal tenderness).  Chest pain sx may be related to initiation of THC, pending eval.

## 2021-05-17 NOTE — ED PROVIDER NOTE - CLINICAL SUMMARY MEDICAL DECISION MAKING FREE TEXT BOX
chest pain after having THC lozenge - now resolved. EKG - NSR. Labs. Likely due to THC, low concern for ACS. Pt c complex med and psych hx who p/w chest pain after taking THC lozenge for neck pain.  No neck pain at present.  Neuro intact. Chest pain now resolved. EKG - NSR. Will send labs to risk stratify for MACE (low risk pending trop, by HEART).  Low risk PE by [facility] and no specific s/s of PE. Declining pain medication for neck pain (suspect MSK pain, mild LEFT cervical paraspinal tenderness).  Chest pain sx may be related to initiation of THC, pending eval.

## 2021-05-17 NOTE — ED PROVIDER NOTE - PATIENT PORTAL LINK FT
You can access the FollowMyHealth Patient Portal offered by [facility] by registering at the following website: [email or web address]. By joining [employer]’s FollowMyHealth portal, you will also be able to view your health information using other applications (apps) compatible with our system.

## 2021-05-17 NOTE — ED PROVIDER NOTE - PROGRESS NOTE DETAILS
Gustavo Weinstein,  PGY-2: no new complains Gustavo Weinstein,  PGY-2: no new complains, all labs reviewed c pt

## 2021-05-17 NOTE — ED PROVIDER NOTE - PHYSICAL EXAMINATION
Gen: non toxic appearing, NAD   Head: NC/NT  Eyes:  anicteric  ENT: airway patent, mmm   CV: RRR, +S1/S2,  symmetric pulses   Resp: CTAB, symmetric breath sounds  GI:  abdomen soft non-distended, NTTP  Extremities -    no edema  Neuro: A&Ox4, following commands, speech clear, moving all four extremities spontaneously

## 2021-05-17 NOTE — ED PROVIDER NOTE - NSFOLLOWUPCLINICS_GEN_ALL_ED_FT
[facility]  Cardiology  [address] [place] [id number]  Phone: [phone]  Fax:

## 2021-05-17 NOTE — ED PROVIDER NOTE - NS ED ROS FT
Gen: Denies fever, chills  CV: +chest pain  Resp: Denies SOB, cough  ENT: Denies nasal congestion  GI: Denies nausea, vomiting, diarrhea  Msk: Denies LE swelling  : Denies dysuria  Neuro: Denies headache

## 2021-05-17 NOTE — ED PROVIDER NOTE - MUSCULOSKELETAL, MLM
Spine appears normal, range of motion is not limited, no muscle or joint tenderness. Mild RIGHT cervical paraspinal mm tenderness, no bony tenderness.

## 2021-06-19 ENCOUNTER — EMERGENCY (EMERGENCY)
Facility: HOSPITAL | Age: 46
LOS: 1 days | Discharge: ANOTHER HOSPITAL | End: 2021-06-19
Admitting: EMERGENCY MEDICINE
Payer: MEDICARE

## 2021-06-19 VITALS
SYSTOLIC BLOOD PRESSURE: 156 MMHG | RESPIRATION RATE: 16 BRPM | DIASTOLIC BLOOD PRESSURE: 96 MMHG | TEMPERATURE: 98 F | HEIGHT: 67 IN | HEART RATE: 120 BPM | OXYGEN SATURATION: 100 %

## 2021-06-19 LAB
BASOPHILS # BLD AUTO: 0.02 K/UL — SIGNIFICANT CHANGE UP (ref 0–0.2)
BASOPHILS NFR BLD AUTO: 0.2 % — SIGNIFICANT CHANGE UP (ref 0–2)
EOSINOPHIL # BLD AUTO: 0 K/UL — SIGNIFICANT CHANGE UP (ref 0–0.5)
EOSINOPHIL NFR BLD AUTO: 0 % — SIGNIFICANT CHANGE UP (ref 0–6)
HCT VFR BLD CALC: 45.5 % — HIGH (ref 34.5–45)
HGB BLD-MCNC: 15.3 G/DL — SIGNIFICANT CHANGE UP (ref 11.5–15.5)
IANC: 7.74 K/UL — SIGNIFICANT CHANGE UP (ref 1.5–8.5)
IMM GRANULOCYTES NFR BLD AUTO: 0.3 % — SIGNIFICANT CHANGE UP (ref 0–1.5)
LYMPHOCYTES # BLD AUTO: 2.73 K/UL — SIGNIFICANT CHANGE UP (ref 1–3.3)
LYMPHOCYTES # BLD AUTO: 23.8 % — SIGNIFICANT CHANGE UP (ref 13–44)
MCHC RBC-ENTMCNC: 29.8 PG — SIGNIFICANT CHANGE UP (ref 27–34)
MCHC RBC-ENTMCNC: 33.6 GM/DL — SIGNIFICANT CHANGE UP (ref 32–36)
MCV RBC AUTO: 88.7 FL — SIGNIFICANT CHANGE UP (ref 80–100)
MONOCYTES # BLD AUTO: 0.95 K/UL — HIGH (ref 0–0.9)
MONOCYTES NFR BLD AUTO: 8.3 % — SIGNIFICANT CHANGE UP (ref 2–14)
NEUTROPHILS # BLD AUTO: 7.74 K/UL — HIGH (ref 1.8–7.4)
NEUTROPHILS NFR BLD AUTO: 67.4 % — SIGNIFICANT CHANGE UP (ref 43–77)
NRBC # BLD: 0 /100 WBCS — SIGNIFICANT CHANGE UP
NRBC # FLD: 0 K/UL — SIGNIFICANT CHANGE UP
PLATELET # BLD AUTO: 329 K/UL — SIGNIFICANT CHANGE UP (ref 150–400)
RBC # BLD: 5.13 M/UL — SIGNIFICANT CHANGE UP (ref 3.8–5.2)
RBC # FLD: 12.2 % — SIGNIFICANT CHANGE UP (ref 10.3–14.5)
TOXICOLOGY SCREEN, DRUGS OF ABUSE, SERUM RESULT: SIGNIFICANT CHANGE UP
WBC # BLD: 11.47 K/UL — HIGH (ref 3.8–10.5)
WBC # FLD AUTO: 11.47 K/UL — HIGH (ref 3.8–10.5)

## 2021-06-19 PROCEDURE — 93010 ELECTROCARDIOGRAM REPORT: CPT

## 2021-06-19 PROCEDURE — 99285 EMERGENCY DEPT VISIT HI MDM: CPT | Mod: 25

## 2021-06-19 PROCEDURE — 99285 EMERGENCY DEPT VISIT HI MDM: CPT

## 2021-06-19 RX ORDER — LAMOTRIGINE 25 MG/1
150 TABLET, ORALLY DISINTEGRATING ORAL ONCE
Refills: 0 | Status: COMPLETED | OUTPATIENT
Start: 2021-06-19 | End: 2021-06-19

## 2021-06-19 NOTE — ED PROVIDER NOTE - CLINICAL SUMMARY MEDICAL DECISION MAKING FREE TEXT BOX
45 y/o F hx Bipolar D/O , Seizures, PTSD    Labs, Urine Tox/UA, EKG  Medical evaluation performed. There is no clinical evidence of intoxication or any acute medical problem requiring immediate intervention. Patient is awaiting psychiatric consultation. Final disposition will be determined by psychiatrist.

## 2021-06-19 NOTE — ED PROVIDER NOTE - OBJECTIVE STATEMENT
47 y/o F hx Bipolar D/O , Seizures, PTSD  BIBA secondary to agitation and judson .  Admit to suicidal ideations secondary to a verbal altercation with Jews on face book.  Presents internally preoccupied , expressing a flights of ideas. Denies AH/HI/VH. Denies falling, punching or kicking any objects. Denies pain, SOB, fever, chills,  chest/abdominal discomfort. Denies use of alcohol or illicit drugs. No evidence of physical injuries, broken skin or deformities. Admits to medication compliance.

## 2021-06-19 NOTE — ED ADULT TRIAGE NOTE - CHIEF COMPLAINT QUOTE
Pt c/o SI, agitation, ,manic episode today, Pt tried calling suicide hotline and just called 911 instead. Pt states she got into an argument with someone on facebook that caused this to happen. Pt states she would of taken pills. Pt denies any SI at this time. Pt denies SI/HI.  Pt denies visual or auditory hallucinations or other complaints. No complaints of chest pain, headache, nausea, dizziness, vomiting  SOB, fever, chills verbalized.. Pt noted to be manic and flailing around.

## 2021-06-19 NOTE — ED BEHAVIORAL HEALTH ASSESSMENT NOTE - OTHER
euphoric happy no beds at [facility] . Will try to transfer the pt no beds at [facility] . Will try to transfer the pt.  UPDATE: THE PATIENT WAS ACCEPTED TO [facility] as of 5:40PM

## 2021-06-19 NOTE — ED PROVIDER NOTE - CARE PLAN
Principal Discharge DX:	Bipolar 1 disorder   Principal Discharge DX:	Bipolar 1 disorder, manic, moderate

## 2021-06-19 NOTE — ED BEHAVIORAL HEALTH ASSESSMENT NOTE - HPI (INCLUDE ILLNESS QUALITY, SEVERITY, DURATION, TIMING, CONTEXT, MODIFYING FACTORS, ASSOCIATED SIGNS AND SYMPTOMS)
The patient is 47 yo single white female, non-caregiver, disabled, [facility] [place] [facility], with history of Borderline Personality Disorder, Psychogenic Seizures, Bipolar Disorder and post-traumatic stress disorder, history of suicide attempt vs gesture - superficially cut her neck with glass/did not require sutures/no scars in 2014, PMHx of endometriosis , no history of substance abuse, history of trauma, no legal issues or access to firearms, BIB EMS after she called 911 "by mistake"; presents with manic episode.    During the evaluation patient is acutely manic, she presents with mood lability , smiling, laughing inappropriately, euphoric- dysphoric, becomes loud and irritable, she is difficult to follow, presents with racing thoughts and pressured speech, she admits that she hasn't been sleeping for 3-4 nights and has been searching "things on media" Today she had an argument with a Nondenominational woman on Facebook  because "she was unmasked and I leave in Nondenominational community" " I am pissed off, I don't know anymore whom I protect: [place] or the [place]" "all hector are matter" Then she starts talking about COVID symptoms.   She would often start to laugh during the interview. she denies hearing voices, no visions or delusions, she states that she has been taking her meds properly, but "it seems they are not helping" patient states that she feels hyper and happy. She is loud and admits that she is manic, no grandiosity, speech is fast, but coherent.

## 2021-06-19 NOTE — ED BEHAVIORAL HEALTH ASSESSMENT NOTE - PSYCHIATRIC ISSUES AND PLAN (INCLUDE STANDING AND PRN MEDICATION)
manic: needs mood stabilized and PRN's continue with Lamictal 150mg BID.  Primary treatment team to adjust accordingly; +/- add adjunct mood stabilizer/ AAP.  PRN: Seroquel 25mg HS as off label for sleep disturbance.  May add another 25mg to initial dose if no effect noted.  PRN: ativan 2mg PO/IM q6Hrs for agitation/ SEVERE AGITATION

## 2021-06-19 NOTE — ED BEHAVIORAL HEALTH ASSESSMENT NOTE - DETAILS
no beds pt denies but: Attempt vs gesture of cutting neck superficially with glass as per the chart oxycodone, sudafed self per transfer protocol.  Accepting MD is Dr Sharonda Trevino ED team informed of dispo.  Rediscussed final dispo with ALVARADO Gutierrez

## 2021-06-19 NOTE — ED BEHAVIORAL HEALTH ASSESSMENT NOTE - SUMMARY
The patient is 47 yo single white female, non-caregiver, disabled, [facility] [place] [facility], with history of Borderline Personality Disorder, Psychogenic Seizures, Bipolar Disorder and post-traumatic stress disorder, history of suicide attempt vs gesture - superficially cut her neck with glass/did not require sutures/no scars in 2014, PMHx of endometriosis , no history of substance abuse, history of trauma, no legal issues or access to firearms, BIB EMS after she called 911 "by mistake"; presents with manic episode.    During the evaluation patient is acutely manic, she presents with mood lability , smiling, laughing inappropriately, euphoric- dysphoric, becomes loud and irritable, she is difficult to follow, presents with racing thoughts and pressured speech,, she is thought disorganized, admits to diminished need for sleep and goal directed activity, Patient states that she is manic and she "understands that I need psych admission. In 15 min she tells the nurse that she wants to go back     Pt denies active SI at this time. She denies intent or plan. Denies manic and psychotic sx. Her anxiety and crying are similar to prior ED visits where she only presents this way when a provider is around her and these sx are more attributable to  BPD. Given lack of immediate intent or plan, and give diagnosis of borderline personality disorder, there is evidence that inpatient hospitalization may reinforce maladaptive coping and be counter therapeutic. Pt agreed to follow up with crisis center tomorrow to see an outpatient provider as soon as she can. The patient is 47 yo single white female, non-caregiver, disabled, [facility] [place] [facility], with history of Borderline Personality Disorder, Psychogenic Seizures, Bipolar Disorder and post-traumatic stress disorder, history of suicide attempt vs gesture - superficially cut her neck with glass/did not require sutures/no scars in 2014, PMHx of endometriosis , no history of substance abuse, history of trauma, no legal issues or access to firearms, BIB EMS after she called 911 "by mistake"; presents with manic episode.    During the evaluation patient is acutely manic, she presents with mood lability , smiling, laughing inappropriately, euphoric- dysphoric, becomes loud and irritable, she is difficult to follow, presents with racing thoughts and pressured speech,, she is thought disorganized, admits to diminished need for sleep and goal directed activity, Patient states that she is manic and she "understands that I need psych admission. In 15 min she tells the nurse that she wants to go back "home"

## 2021-06-19 NOTE — ED ADULT NURSE NOTE - OBJECTIVE STATEMENT
Pt received to . Pt presents highly agitated with yelling, screaming and cursing, not responding to verbal redirection. Pt appears delusional, repeatedly screaming "someone out there is trying to kill a girl, get a  now!" Pt evaluated by NP and medicated as ordered. Pts belongings secured for safety. Pt received to . Pt presents manic like behavior with rapid speech and flight of ideas also appears euphoric with intermittent outburst of laughter. Per EMS, pt called suicide prevention hotline; pt admits to calling but denies SI during RN assessment. Pt also endorsing insomnia and racing thoughts. Pts belongings secured for safety. Pt awaiting psychiatric consultation.

## 2021-06-19 NOTE — ED BEHAVIORAL HEALTH ASSESSMENT NOTE - DESCRIPTION
see HPI Vital Signs Last 24 Hrs  T(C): 36.9 (19 Jun 2021 22:06), Max: 36.9 (19 Jun 2021 22:06)  T(F): 98.5 (19 Jun 2021 22:06), Max: 98.5 (19 Jun 2021 22:06)  HR: 120 (19 Jun 2021 22:06) (120 - 120)  BP: 156/96 (19 Jun 2021 22:06) (156/96 - 156/96)  BP(mean): --  RR: 16 (19 Jun 2021 22:06) (16 - 16)  SpO2: 100% (19 Jun 2021 22:06) (100% - 100%) Nonepileptic seizures

## 2021-06-19 NOTE — ED PROVIDER NOTE - PROGRESS NOTE DETAILS
Nawaf OSBORNE: Pt signed out to me.  Labs reviewed, she is medically clear, has been evaluated by psychiatry and will require admission.  Pt noted to be irritable and disorganized earlier in the evening, received home dose of lamictal and po ativan.  Pt cont to be disorganized, given IM ativan with positive effect.  She is now calm, resting comfortably.  Pending bed availability for psych admission and stabilization.

## 2021-06-19 NOTE — ED ADULT NURSE NOTE - NS_NURSE_BED_LOCATION_ED_ALL_ED
Zahida Mckeon is a 55year old female. Patient presents with: Other: arthritis pain in hands--a couple fingers have been more sore than the others, more swelling in RT hand thumb and LT hand index finger. ...room 2      HPI:   Patient has painful finger • BLADDER CANCER [Other] Peng Dy Father         Social History:    Smoking Status: Never Smoker                      Smokeless Status: Never Used                        Alcohol Use: Yes           0.0 oz/week       0 Standard drinks or equivalent per wee Yes (specify)

## 2021-06-19 NOTE — ED BEHAVIORAL HEALTH ASSESSMENT NOTE - OTHER PAST PSYCHIATRIC HISTORY (INCLUDE DETAILS REGARDING ONSET, COURSE OF ILLNESS, INPATIENT/OUTPATIENT TREATMENT)
5 hospitalizations at [facility] last in 3/2019  History of Borderline Personality Disorder, Bipolar Disorder and post-traumatic stress disorder, history of suicide attempt vs gesture (as per the chart- superficially cut her neck with glass, did not require sutures.  She is currently under care of Dr Amaro at adult ambulatory care clinic,

## 2021-06-19 NOTE — ED BEHAVIORAL HEALTH ASSESSMENT NOTE - RISK ASSESSMENT
Low Acute Suicide Risk Protective factors include no violence history, medication compliance, supportive housing, no access to guns, no substance abuse, willingness to seek help, no homicidal ideation, no active SI at this time, agrees to f/u. Chronic elevated background risk given risk factors include history of suicidal thoughts in the past/suicidal gestures, multiple hospitalizations poor social supports and BPD. Low imminent risk of self harm and patient is not currently an acute danger to self or others.

## 2021-06-20 ENCOUNTER — INPATIENT (INPATIENT)
Facility: HOSPITAL | Age: 46
LOS: 4 days | Discharge: HOME | End: 2021-06-25
Attending: PSYCHIATRY & NEUROLOGY | Admitting: PSYCHIATRY & NEUROLOGY
Payer: MEDICARE

## 2021-06-20 VITALS
TEMPERATURE: 97 F | HEIGHT: 67 IN | DIASTOLIC BLOOD PRESSURE: 78 MMHG | RESPIRATION RATE: 20 BRPM | SYSTOLIC BLOOD PRESSURE: 116 MMHG | WEIGHT: 148.59 LBS | HEART RATE: 102 BPM

## 2021-06-20 VITALS
OXYGEN SATURATION: 100 % | HEART RATE: 110 BPM | SYSTOLIC BLOOD PRESSURE: 124 MMHG | TEMPERATURE: 98 F | DIASTOLIC BLOOD PRESSURE: 78 MMHG | RESPIRATION RATE: 16 BRPM

## 2021-06-20 DIAGNOSIS — F31.12 BIPOLAR DISORDER, CURRENT EPISODE MANIC WITHOUT PSYCHOTIC FEATURES, MODERATE: ICD-10-CM

## 2021-06-20 LAB
ALBUMIN SERPL ELPH-MCNC: 4.7 G/DL — SIGNIFICANT CHANGE UP (ref 3.3–5)
ALP SERPL-CCNC: 81 U/L — SIGNIFICANT CHANGE UP (ref 40–120)
ALT FLD-CCNC: 6 U/L — SIGNIFICANT CHANGE UP (ref 4–33)
ANION GAP SERPL CALC-SCNC: 15 MMOL/L — HIGH (ref 7–14)
APPEARANCE UR: CLEAR — SIGNIFICANT CHANGE UP
AST SERPL-CCNC: 14 U/L — SIGNIFICANT CHANGE UP (ref 4–32)
BILIRUB SERPL-MCNC: 0.4 MG/DL — SIGNIFICANT CHANGE UP (ref 0.2–1.2)
BILIRUB UR-MCNC: NEGATIVE — SIGNIFICANT CHANGE UP
BUN SERPL-MCNC: 11 MG/DL — SIGNIFICANT CHANGE UP (ref 7–23)
CALCIUM SERPL-MCNC: 11.2 MG/DL — HIGH (ref 8.4–10.5)
CHLORIDE SERPL-SCNC: 103 MMOL/L — SIGNIFICANT CHANGE UP (ref 98–107)
CO2 SERPL-SCNC: 18 MMOL/L — LOW (ref 22–31)
COLOR SPEC: ABNORMAL
COVID-19 NUCLEOCAPSID GAM AB INTERP: NEGATIVE — SIGNIFICANT CHANGE UP
COVID-19 NUCLEOCAPSID TOTAL GAM ANTIBODY RESULT: 0.09 INDEX — SIGNIFICANT CHANGE UP
CREAT SERPL-MCNC: 0.95 MG/DL — SIGNIFICANT CHANGE UP (ref 0.5–1.3)
DIFF PNL FLD: NEGATIVE — SIGNIFICANT CHANGE UP
GLUCOSE SERPL-MCNC: 148 MG/DL — HIGH (ref 70–99)
GLUCOSE UR QL: NEGATIVE — SIGNIFICANT CHANGE UP
KETONES UR-MCNC: ABNORMAL
LEUKOCYTE ESTERASE UR-ACNC: NEGATIVE — SIGNIFICANT CHANGE UP
NITRITE UR-MCNC: NEGATIVE — SIGNIFICANT CHANGE UP
PCP SPEC-MCNC: SIGNIFICANT CHANGE UP
PH UR: 6.5 — SIGNIFICANT CHANGE UP (ref 5–8)
POTASSIUM SERPL-MCNC: 4 MMOL/L — SIGNIFICANT CHANGE UP (ref 3.5–5.3)
POTASSIUM SERPL-SCNC: 4 MMOL/L — SIGNIFICANT CHANGE UP (ref 3.5–5.3)
PROT SERPL-MCNC: 7.8 G/DL — SIGNIFICANT CHANGE UP (ref 6–8.3)
PROT UR-MCNC: ABNORMAL
SARS-COV-2 IGG+IGM SERPL QL IA: 0.09 INDEX — SIGNIFICANT CHANGE UP
SARS-COV-2 IGG+IGM SERPL QL IA: NEGATIVE — SIGNIFICANT CHANGE UP
SARS-COV-2 RNA SPEC QL NAA+PROBE: SIGNIFICANT CHANGE UP
SODIUM SERPL-SCNC: 136 MMOL/L — SIGNIFICANT CHANGE UP (ref 135–145)
SP GR SPEC: 1.03 — HIGH (ref 1.01–1.02)
TSH SERPL-MCNC: 1.42 UIU/ML — SIGNIFICANT CHANGE UP (ref 0.27–4.2)
UROBILINOGEN FLD QL: SIGNIFICANT CHANGE UP

## 2021-06-20 PROCEDURE — 99232 SBSQ HOSP IP/OBS MODERATE 35: CPT

## 2021-06-20 RX ORDER — LAMOTRIGINE 25 MG/1
150 TABLET, ORALLY DISINTEGRATING ORAL
Refills: 0 | Status: DISCONTINUED | OUTPATIENT
Start: 2021-06-20 | End: 2021-06-25

## 2021-06-20 RX ORDER — HYDROXYZINE HCL 10 MG
25 TABLET ORAL EVERY 6 HOURS
Refills: 0 | Status: DISCONTINUED | OUTPATIENT
Start: 2021-06-20 | End: 2021-06-21

## 2021-06-20 RX ORDER — FAMOTIDINE 10 MG/ML
20 INJECTION INTRAVENOUS
Refills: 0 | Status: DISCONTINUED | OUTPATIENT
Start: 2021-06-20 | End: 2021-06-25

## 2021-06-20 RX ORDER — QUETIAPINE FUMARATE 200 MG/1
100 TABLET, FILM COATED ORAL AT BEDTIME
Refills: 0 | Status: DISCONTINUED | OUTPATIENT
Start: 2021-06-20 | End: 2021-06-21

## 2021-06-20 RX ORDER — HALOPERIDOL DECANOATE 100 MG/ML
5 INJECTION INTRAMUSCULAR ONCE
Refills: 0 | Status: COMPLETED | OUTPATIENT
Start: 2021-06-20 | End: 2021-06-20

## 2021-06-20 RX ORDER — QUETIAPINE FUMARATE 200 MG/1
50 TABLET, FILM COATED ORAL EVERY 6 HOURS
Refills: 0 | Status: DISCONTINUED | OUTPATIENT
Start: 2021-06-20 | End: 2021-06-21

## 2021-06-20 RX ORDER — LAMOTRIGINE 25 MG/1
150 TABLET, ORALLY DISINTEGRATING ORAL
Refills: 0 | Status: DISCONTINUED | OUTPATIENT
Start: 2021-06-20 | End: 2021-06-23

## 2021-06-20 RX ORDER — CLONAZEPAM 1 MG
0.5 TABLET ORAL
Refills: 0 | Status: DISCONTINUED | OUTPATIENT
Start: 2021-06-20 | End: 2021-06-25

## 2021-06-20 RX ORDER — LAMOTRIGINE 25 MG/1
150 TABLET, ORALLY DISINTEGRATING ORAL ONCE
Refills: 0 | Status: COMPLETED | OUTPATIENT
Start: 2021-06-20 | End: 2021-06-20

## 2021-06-20 RX ORDER — DIPHENHYDRAMINE HCL 50 MG
50 CAPSULE ORAL ONCE
Refills: 0 | Status: COMPLETED | OUTPATIENT
Start: 2021-06-20 | End: 2021-06-20

## 2021-06-20 RX ADMIN — LAMOTRIGINE 150 MILLIGRAM(S): 25 TABLET, ORALLY DISINTEGRATING ORAL at 08:05

## 2021-06-20 RX ADMIN — Medication 50 MILLIGRAM(S): at 18:47

## 2021-06-20 RX ADMIN — HALOPERIDOL DECANOATE 5 MILLIGRAM(S): 100 INJECTION INTRAMUSCULAR at 18:47

## 2021-06-20 RX ADMIN — LAMOTRIGINE 150 MILLIGRAM(S): 25 TABLET, ORALLY DISINTEGRATING ORAL at 00:21

## 2021-06-20 RX ADMIN — Medication 2 MILLIGRAM(S): at 16:25

## 2021-06-20 RX ADMIN — Medication 0.5 MILLIGRAM(S): at 22:57

## 2021-06-20 RX ADMIN — FAMOTIDINE 20 MILLIGRAM(S): 10 INJECTION INTRAVENOUS at 22:57

## 2021-06-20 RX ADMIN — Medication 2 MILLIGRAM(S): at 04:05

## 2021-06-20 RX ADMIN — LAMOTRIGINE 150 MILLIGRAM(S): 25 TABLET, ORALLY DISINTEGRATING ORAL at 22:57

## 2021-06-20 RX ADMIN — Medication 2 MILLIGRAM(S): at 00:21

## 2021-06-20 NOTE — H&P ADULT - NSICDXPASTMEDICALHX_GEN_ALL_CORE_FT
PAST MEDICAL HISTORY:  Bipolar 1 disorder     Endometriosis     Epilepsy     Genital herpes     High cholesterol     Major depression     PTSD (post-traumatic stress disorder)     Seizures

## 2021-06-20 NOTE — BH CONSULTATION LIAISON PROGRESS NOTE - NSBHCONSULTPSYCHPLAN_PSY_A_CORE FT
continue Lamictal 150 mg BID, prn ativan 2 mg PO/IM for anxiety/agitation continue Lamictal 150 mg BID, prn ativan 2 mg PO/IM for anxiety/agitation, Seroquel 50 mg prn qhs for insomnia

## 2021-06-20 NOTE — ED ADULT NURSE REASSESSMENT NOTE - NS ED NURSE REASSESS COMMENT FT1
1710 Report giving to covering MARY Ernandez.  1845 Pt calm & cooperative denies si/hi/avh presently, pt transferred to [facility] via EMS.
Received report from night RN, pt calm & cooperative denies si/hi presently, pt tolerated breakfast awaiting bed assignment eval on going.
break coverage RN - Pt transferred to [facility] by [facility]. Report given to unit by JOLANTA Chen. VS done, pt irritated and anxious about transfer, requested PO medication for transfer. Medicated per order.
BIBA, patient is awake and disoriented at baseline due dementia, s/p fall from wheelchair, + head injury, on plavix, code trauma b called by Ed staff

## 2021-06-20 NOTE — ED BEHAVIORAL HEALTH NOTE - BEHAVIORAL HEALTH NOTE
COVID Exposure Screen- Patient     *Have you had a COVID-19 test in the last 90 days?  (  ) Yes   (X  ) No   (  ) Unknown- Reason: _____     IF YES PROCEED TO QUESTION #2. IF NO OR UNKNOWN, PLEASE SKIP TO QUESTION #3.     Date of test(s) and result(s): ________     *Have you tested positive for COVID-19 antibodies? (  ) Yes   ( X ) No   (  ) Unknown- Reason: _____     IF YES PROCEED TO QUESTION #4. IF NO or UNKNOWN, PLEASE SKIP TO QUESTION #5.     Date of positive antibody test: ________     *Have you received 2 doses of the COVID-19 vaccine? ( X ) Yes   (  ) No   (  ) Unknown- Reason: _____      IF YES PROCEED TO QUESTION #6. IF NO or UNKNOWN, PLEASE SKIP TO QUESTION #7.     Date of second dose: 5/1/21________     *In the past 10 days, have you been around anyone with a positive COVID-19 test?* (  ) Yes   (  X) No   (  ) Unknown- Reason: ____     IF YES PROCEED TO QUESTION #8. IF NO or UNKNOWN, PLEASE SKIP TO QUESTION #13.     Were you within 6 feet of them for at least 15 minutes? (  ) Yes   (  ) No   (  ) Unknown- Reason: _____     Have you provided care for them? (  ) Yes   (  ) No   (  ) Unknown- Reason: ______     Have you had direct physical contact with them (touched, hugged, or kissed them)? (  ) Yes   (  ) No    (  ) Unknown- Reason: _____     Have you shared eating or drinking utensils with them? (  ) Yes   (  ) No    (  ) Unknown- Reason: ____     Have they sneezed, coughed, or somehow gotten respiratory droplets on you? (  ) Yes   (  ) No    (  ) Unknown- Reason: ______     *Have you been out of [place] within the past 10 days?* (  ) Yes   ( X ) No   (  ) Unknown- Reason: _____     IF YES PLEASE ANSWER THE FOLLOWING QUESTIONS:     Which state/country have you been to? ______     Were you there over 24 hours? (  ) Yes   (  ) No    (  ) Unknown- Reason: ______     Date of return to [facility]: ______

## 2021-06-20 NOTE — BH CONSULTATION LIAISON PROGRESS NOTE - MODIFICATIONS
I have seen and examined the Pt with NP LINDA Tate and performed key elements of the History and Mental Status Examination.  I concur with his assessment and recommendations.   I have discussed the Pt's assessment and plan of care with NP LINDA Tate.   I agree with the note as stated above, having amended the EMR as needed to reflect my findings.  This includes during the time I functioned as the attending physician for this Pt at the -ED of [facility].

## 2021-06-20 NOTE — BH CONSULTATION LIAISON PROGRESS NOTE - NSBHFUPINTERVALHXFT_PSY_A_CORE
47 y/o female with reported hx of Borderline Personality Disorder, Psychogenic Seizures, Bipolar Disorder and post-traumatic stress disorder, history of suicide attempt vs gesture - superficially cut her neck with glass/did not require sutures/no scars in 2014, presents to the ED on 6/19 after calling the suicide hotline as she was having suicide thoughts. Pt reports has not been able to sleep for the past few weeks, having racing thoughts, due to the covid pandemic, her bipolar and thoughts that she might be having a seizure (last reported sz "years ago").  Her mood is elated, hyperverbal with pressure speech, difficult to follow at times. She denies feeling suicidal at present, states trying figure out if she needs to be admitted. Reports of fair sleep last night even with prn med of ativan 2 mg x 2. Pt denies AVH, paranoia. Pt made aware of admission status and that the team is working on a bed and will transfer when one becomes available.

## 2021-06-20 NOTE — BH CONSULTATION LIAISON PROGRESS NOTE - NSBHASSESSMENTFT_PSY_ALL_CORE
Pt presenting with some manic symptoms, including pressured speech, flight of ideas, tangential thought process, elated mood. She admits to racing thoughts, not sleeping and feeling "manic". She endorsed suicidal thoughts earlier, calling the suicide hotline, denies at presents, ambivalent about be able to safety plan and needing inpatient admission. At this time will continue pursuing admission for safety and stabilization.

## 2021-06-20 NOTE — H&P ADULT - HISTORY OF PRESENT ILLNESS
47 y/o F hx Bipolar D/O , Seizures, PTSD  BIBA secondary to agitation and judson .  Admit to suicidal ideations secondary to a verbal altercation with Jews on face book.  Presents internally preoccupied , expressing a flights of ideas. Denies AH/HI/VH. Denies falling, punching or kicking any objects. Denies pain, SOB, fever, chills,  chest/abdominal discomfort. Denies use of alcohol or illicit drugs. No evidence of physical injuries, broken skin or deformities. Admits to medication compliance.    Presenting Symptoms in ER on 6/19 : AGITATION, ANXIETY, IRRITABILITY, PARANOIA, SUICIDAL    Pt seen, calm and denies any medical complaints

## 2021-06-20 NOTE — PROGRESS NOTE BEHAVIORAL HEALTH - NSBHFUPADDHPIFT_PSY_A_CORE
Pt reports feeling tired after she required haldol and benadryl in the ED. Pt said she knew she is hyper-sensitive now, with psychogenic seizure episode - flailing her arms.    Pt said she slept about 15-20 hours in the last two weeks. She attributes from her current manic episode to the sequelae from the 2nd dose of COVID-19 vacacine which she received at the beginning of May. She read a lot about the mental health sequelae from the internet blogs. Pt said she also hears conversation in her head. She said she called EMS because she does not want her become deteriotes and to kill herself. She has poor appetite too.

## 2021-06-20 NOTE — BH CONSULTATION LIAISON PROGRESS NOTE - CURRENT MEDICATION
MEDICATIONS  (STANDING):  lamoTRIgine 150 milliGRAM(s) Oral two times a day    MEDICATIONS  (PRN): MEDICATIONS  (STANDING):  lamoTRIgine 150 milliGRAM(s) Oral two times a day    MEDICATIONS  (PRN):  Klonopin 0.5 mg BID

## 2021-06-20 NOTE — BH CONSULTATION LIAISON PROGRESS NOTE - NSBHCHARTREVIEWLAB_PSY_A_CORE FT
06-19    136  |  103  |  11  ----------------------------<  148<H>  4.0   |  18<L>  |  0.95    Ca    11.2<H>      19 Jun 2021 23:41    TPro  7.8  /  Alb  4.7  /  TBili  0.4  /  DBili  x   /  AST  14  /  ALT  6   /  AlkPhos  81  06-19

## 2021-06-20 NOTE — BH CONSULTATION LIAISON PROGRESS NOTE - CASE SUMMARY
46/F with hx of bipolar disorder, PTSD and borderline PD; has multiple past psych admissions, hx of SA and no documented substance abuse issues.  Currently, remains disorganized in her TP, is severely affectively dysregulated, remains unpredictable, is minimizing symptoms as a ploy to be discharged, has poor insight and impaired judgement.  Said symptoms causing functional impairment.  Pt needs in-Pt psych admission aimed at mood stabilization.      RECOMMENDATIONS:  1. AS DISCUSSED during hand over with night shift Psych ED attending, to continue with Lamictal 150mg BID.  Primary treatment team to adjust accordingly; +/- add adjunct mood stabilizer/ AAP   2. PRN: Seroquel 25mg HS as off label for sleep disturbance.  May add another 25mg to initial dose if no effect noted.   3. PRN: ativan 2mg PO/IM q6Hrs for agitation/ SEVERE AGITATION  4. once medically cleared, facilitate transfer to in-Pt psych unit on involuntary status  - for now, was informed that no beds are available at [facility], [facility] or [facility]  - will attempt to transfer to other [facility] (if there is available beds)  - discussed with  staff       I STOP Reference #: [id number] - NO CURRENT OR RECENT BZD PRESCRIBED  Pt was prescribed last 05/11/2021 and filled on 06/04/2021 with [facility] (20:1) 5mgthc and 0.25 mgcbd/curfrancois orange	1	2	Sultana Sam ()	[id number]	[place]	[facility] [place]    she was last prescribed on 03/25/2021	and filled on 03/26/2021 with clonazepam 0.5 mg x 120 tablets for 30 days by Rick Lemus	[id number]	Medicare	[facility]

## 2021-06-20 NOTE — H&P ADULT - NSHPLABSRESULTS_GEN_ALL_CORE
06-19    136  |  103  |  11  ----------------------------<  148<H>  4.0   |  18<L>  |  0.95    Ca    11.2<H>      19 Jun 2021 23:41    TPro  7.8  /  Alb  4.7  /  TBili  0.4  /  DBili  x   /  AST  14  /  ALT  6   /  AlkPhos  81  06-19                            15.3   11.47 )-----------( 329      ( 19 Jun 2021 23:41 )             45.5

## 2021-06-20 NOTE — H&P ADULT - NSHPPHYSICALEXAM_GEN_ALL_CORE
Vital Signs Last 24 Hrs  T(C): 36.3 (20 Jun 2021 21:52), Max: 37 (20 Jun 2021 12:00)  T(F): 97.3 (20 Jun 2021 21:52), Max: 98.6 (20 Jun 2021 12:00)  HR: 102 (20 Jun 2021 21:52) (99 - 115)  BP: 116/78 (20 Jun 2021 21:52) (116/78 - 132/95)  RR: 20 (20 Jun 2021 21:52) (16 - 20)  SpO2: 100% (20 Jun 2021 18:52) (99% - 100%)    · Appearance: well appearing no apparent awake, alert OVERWEIGHT  · ENMT: Airway patent, Nasal mucosa clear. Mouth with normal mucosa.   · EYES: Clear bilaterally  · CARDIAC: Normal rate, regular rhythm.  Heart sounds S1, S2.  No murmurs, rubs or gallops.  · RESPIRATORY: Breath sounds clear and equal bilaterally.  · GASTROINTESTINAL: Abdomen soft, non-tender, no guarding.  · MUSCULOSKELETAL:  range of motion is not limited, no muscle or joint tenderness  · NEUROLOGICAL: Alert and oriented, no focal deficits, no motor or sensory deficits.  · SKIN: Skin normal color for race, warm, dry and intact. No evidence of rash.

## 2021-06-20 NOTE — ED BEHAVIORAL HEALTH NOTE - BEHAVIORAL HEALTH NOTE
Spoke with Dr. Casper on call psychiatrist at [facility] ([phone]), for possible transfer. She will review the chart and inform of acceptance. Chart faxed to [phone] for review.

## 2021-06-20 NOTE — PROGRESS NOTE BEHAVIORAL HEALTH - NSBHFUPINTERVALHXFT_PSY_A_CORE
Pt was alert and awake, oriented. She said she realized that her voice is loud. SHe endorses having many thoughts running in her head. Pt said she takes her meds daily. She said she does not want to take Lithium and Depakote.    Pt said majority of the time, her mood was depressed, with suicidal thoughts in the past. She said in the past few years, she is not doing well, with mood  symptoms coming and going. Pt said she has over 10 IPPs in the past, more for suicidal ideation. "I did not really want to kill myself, but ask for help".  Pt denies current suicidal ideation. SHe said she likes to take seroquel for sleep.

## 2021-06-20 NOTE — ED BEHAVIORAL HEALTH NOTE - BEHAVIORAL HEALTH NOTE
SW advised pt planned for admit,called for bed availability @ [facility] spoke with Dr Hernandez, no beds, staff spoke with [facility], possible beds faxed over clinicals labs ekg

## 2021-06-20 NOTE — ED BEHAVIORAL HEALTH NOTE - BEHAVIORAL HEALTH NOTE
ARIS attempted to fax over vital docs legals/covid results to [facility], was advised by Marco pt is cleared for transfer, to send original docs w/ pt

## 2021-06-20 NOTE — BH CONSULTATION LIAISON PROGRESS NOTE - NSBHCHARTREVIEWVS_PSY_A_CORE FT
Vital Signs Last 24 Hrs  T(C): 36.9 (20 Jun 2021 03:20), Max: 36.9 (19 Jun 2021 22:06)  T(F): 98.5 (20 Jun 2021 03:20), Max: 98.5 (19 Jun 2021 22:06)  HR: 115 (20 Jun 2021 03:20) (115 - 120)  BP: 132/95 (20 Jun 2021 03:20) (132/95 - 156/96)  BP(mean): --  RR: 16 (20 Jun 2021 03:20) (16 - 16)  SpO2: 99% (20 Jun 2021 03:20) (99% - 100%)

## 2021-06-20 NOTE — ED BEHAVIORAL HEALTH NOTE - BEHAVIORAL HEALTH NOTE
called [facility] and initially spoke with Dr Frank Grayson ([phone]) at 1040AM with aim to possibly transfer this Pt as per bed board, [facility] shows that there is a female bed available (as of 7:40AM)  - the line was cut off.  I then attempted to call back. NO answer; I left a voice mail  at around 1052AM, I once again attempted to call back the 2nd time. NO answer.    discussed with our  SW, Ms IGNACIA Dang who called [facility].  As per bed board, [facility] has 2 female beds available (as of 7:40AM)  - Ms Mayo was instructed to have the Pt's info packet faxed over.  will await for final dispo called [facility] and initially spoke with Dr Frank Grayson ([phone]) at 1040AM with aim to possibly transfer this Pt as per bed board, [facility] shows that there is a female bed available (as of 7:40AM)  - the line was cut off.  I then attempted to call back. NO answer; I left a voice mail  at around 1052AM, I once again attempted to call back the 2nd time. NO answer.    discussed with our  SW, Ms IGNACIA Dang who called [facility].  As per bed board, [facility] has 2 female beds available (as of 7:40AM)  - Ms Mayo was instructed to have the Pt's info packet faxed over.  will await for final dispo    at 12:02PM, once again attempted to follow up with Dr Grayson re: bed availability. No answer; once again, left voice mail called [facility] and initially spoke with Dr Frank Grayson ([phone]) at 1040AM with aim to possibly transfer this Pt as per bed board, [facility] shows that there is a female bed available (as of 7:40AM)  - the line was cut off.  I then attempted to call back. NO answer; I left a voice mail  at around 1052AM, I once again attempted to call back the 2nd time. NO answer.    discussed with our  SW, Ms IGNACIA Dang who called [facility].  As per bed board, [facility] has 2 female beds available (as of 7:40AM)  - Ms Mayo was instructed to have the Pt's info packet faxed over.  will await for final dispo    at 12:02PM, once again attempted to follow up with Dr Grayson re: bed availability. No answer; once again, left voice mail    at 12:25PM, Dr Grayson responded via Teams. Informed us that [facility] has no more beds available.   Also spoke with Dr Hernandez, Psych attending at [facility] who also informed us that there are no more beds available called [facility] and initially spoke with Dr Frank Grayson ([phone]) at 1040AM with aim to possibly transfer this Pt as per bed board, [facility] shows that there is a female bed available (as of 7:40AM)  - the line was cut off.  I then attempted to call back. NO answer; I left a voice mail  at around 1052AM, I once again attempted to call back the 2nd time. NO answer.    discussed with our  SW, Ms IGNACIA Dang who called [facility].  As per bed board, [facility] has 2 female beds available (as of 7:40AM)  - Ms Mayo was instructed to have the Pt's info packet faxed over.  will await for final dispo    at 12:02PM, once again attempted to follow up with Dr Grayson re: bed availability. No answer; once again, left voice mail    at 12:25PM, Dr Grayson responded via Teams. Informed us that [facility] has no more beds available.   Also spoke with Dr Hernandez, Psych attending at [facility] who also informed us that there are no more beds available    at 5:40PM, we called [facility] ([phone]).  All required documents - data sheet + labs + covid test results including 9.27 legals have been received.  Pt has been accepted as per [facility] staff, Robb.  Accepting MD is Dr Sharonda Trevino  - [facility]  ED adviced re: this acceptance  -  ED staff to facilitate the transfer to [facility]

## 2021-06-20 NOTE — H&P ADULT - ASSESSMENT
47 y/o F hx Bipolar D/O , Seizures, PTSD  BIBA secondary to agitation and judson .  no medical history and no medical complaints   noted all the allergies   reviewed labs  mild leukocytosis , no fever, vitals stable     - c/w IPP management   - monitor vitals   - call medical team as needed

## 2021-06-21 DIAGNOSIS — F31.11 BIPOLAR DISORDER, CURRENT EPISODE MANIC WITHOUT PSYCHOTIC FEATURES, MILD: ICD-10-CM

## 2021-06-21 DIAGNOSIS — F39 UNSPECIFIED MOOD [AFFECTIVE] DISORDER: ICD-10-CM

## 2021-06-21 LAB
A1C WITH ESTIMATED AVERAGE GLUCOSE RESULT: 5.5 % — SIGNIFICANT CHANGE UP (ref 4–5.6)
ALBUMIN SERPL ELPH-MCNC: 4.2 G/DL — SIGNIFICANT CHANGE UP (ref 3.5–5.2)
ALP SERPL-CCNC: 79 U/L — SIGNIFICANT CHANGE UP (ref 30–115)
ALT FLD-CCNC: 8 U/L — SIGNIFICANT CHANGE UP (ref 0–41)
ANION GAP SERPL CALC-SCNC: 7 MMOL/L — SIGNIFICANT CHANGE UP (ref 7–14)
AST SERPL-CCNC: 16 U/L — SIGNIFICANT CHANGE UP (ref 0–41)
BASOPHILS # BLD AUTO: 0.01 K/UL — SIGNIFICANT CHANGE UP (ref 0–0.2)
BASOPHILS NFR BLD AUTO: 0.1 % — SIGNIFICANT CHANGE UP (ref 0–1)
BILIRUB SERPL-MCNC: 0.3 MG/DL — SIGNIFICANT CHANGE UP (ref 0.2–1.2)
BUN SERPL-MCNC: 13 MG/DL — SIGNIFICANT CHANGE UP (ref 10–20)
CALCIUM SERPL-MCNC: 10.5 MG/DL — HIGH (ref 8.5–10.1)
CHLORIDE SERPL-SCNC: 105 MMOL/L — SIGNIFICANT CHANGE UP (ref 98–110)
CHOLEST SERPL-MCNC: 248 MG/DL — HIGH
CO2 SERPL-SCNC: 27 MMOL/L — SIGNIFICANT CHANGE UP (ref 17–32)
CREAT SERPL-MCNC: 0.9 MG/DL — SIGNIFICANT CHANGE UP (ref 0.7–1.5)
EOSINOPHIL # BLD AUTO: 0 K/UL — SIGNIFICANT CHANGE UP (ref 0–0.7)
EOSINOPHIL NFR BLD AUTO: 0 % — SIGNIFICANT CHANGE UP (ref 0–8)
ESTIMATED AVERAGE GLUCOSE: 111 MG/DL — SIGNIFICANT CHANGE UP (ref 68–114)
GLUCOSE SERPL-MCNC: 153 MG/DL — HIGH (ref 70–99)
HCG SERPL-ACNC: <0.6 MIU/ML — SIGNIFICANT CHANGE UP
HCT VFR BLD CALC: 43.7 % — SIGNIFICANT CHANGE UP (ref 37–47)
HDLC SERPL-MCNC: 74 MG/DL — SIGNIFICANT CHANGE UP
HGB BLD-MCNC: 14.5 G/DL — SIGNIFICANT CHANGE UP (ref 12–16)
IMM GRANULOCYTES NFR BLD AUTO: 0.5 % — HIGH (ref 0.1–0.3)
LIPID PNL WITH DIRECT LDL SERPL: 161 MG/DL — HIGH
LYMPHOCYTES # BLD AUTO: 2.4 K/UL — SIGNIFICANT CHANGE UP (ref 1.2–3.4)
LYMPHOCYTES # BLD AUTO: 31.8 % — SIGNIFICANT CHANGE UP (ref 20.5–51.1)
MAGNESIUM SERPL-MCNC: 2 MG/DL — SIGNIFICANT CHANGE UP (ref 1.8–2.4)
MCHC RBC-ENTMCNC: 30.1 PG — SIGNIFICANT CHANGE UP (ref 27–31)
MCHC RBC-ENTMCNC: 33.2 G/DL — SIGNIFICANT CHANGE UP (ref 32–37)
MCV RBC AUTO: 90.7 FL — SIGNIFICANT CHANGE UP (ref 81–99)
MONOCYTES # BLD AUTO: 0.61 K/UL — HIGH (ref 0.1–0.6)
MONOCYTES NFR BLD AUTO: 8.1 % — SIGNIFICANT CHANGE UP (ref 1.7–9.3)
NEUTROPHILS # BLD AUTO: 4.48 K/UL — SIGNIFICANT CHANGE UP (ref 1.4–6.5)
NEUTROPHILS NFR BLD AUTO: 59.5 % — SIGNIFICANT CHANGE UP (ref 42.2–75.2)
NON HDL CHOLESTEROL: 174 MG/DL — HIGH
NRBC # BLD: 0 /100 WBCS — SIGNIFICANT CHANGE UP (ref 0–0)
PLATELET # BLD AUTO: 283 K/UL — SIGNIFICANT CHANGE UP (ref 130–400)
POTASSIUM SERPL-MCNC: 4.7 MMOL/L — SIGNIFICANT CHANGE UP (ref 3.5–5)
POTASSIUM SERPL-SCNC: 4.7 MMOL/L — SIGNIFICANT CHANGE UP (ref 3.5–5)
PROT SERPL-MCNC: 7.2 G/DL — SIGNIFICANT CHANGE UP (ref 6–8)
RBC # BLD: 4.82 M/UL — SIGNIFICANT CHANGE UP (ref 4.2–5.4)
RBC # FLD: 12.5 % — SIGNIFICANT CHANGE UP (ref 11.5–14.5)
SODIUM SERPL-SCNC: 139 MMOL/L — SIGNIFICANT CHANGE UP (ref 135–146)
TRIGL SERPL-MCNC: 89 MG/DL — SIGNIFICANT CHANGE UP
WBC # BLD: 7.54 K/UL — SIGNIFICANT CHANGE UP (ref 4.8–10.8)
WBC # FLD AUTO: 7.54 K/UL — SIGNIFICANT CHANGE UP (ref 4.8–10.8)

## 2021-06-21 PROCEDURE — 99232 SBSQ HOSP IP/OBS MODERATE 35: CPT

## 2021-06-21 PROCEDURE — 93010 ELECTROCARDIOGRAM REPORT: CPT

## 2021-06-21 RX ORDER — QUETIAPINE FUMARATE 200 MG/1
50 TABLET, FILM COATED ORAL AT BEDTIME
Refills: 0 | Status: DISCONTINUED | OUTPATIENT
Start: 2021-06-21 | End: 2021-06-25

## 2021-06-21 RX ORDER — HYDROXYZINE HCL 10 MG
25 TABLET ORAL EVERY 6 HOURS
Refills: 0 | Status: DISCONTINUED | OUTPATIENT
Start: 2021-06-21 | End: 2021-06-25

## 2021-06-21 RX ORDER — LITHIUM CARBONATE 300 MG/1
150 TABLET, EXTENDED RELEASE ORAL
Refills: 0 | Status: DISCONTINUED | OUTPATIENT
Start: 2021-06-21 | End: 2021-06-25

## 2021-06-21 RX ADMIN — Medication 25 MILLIGRAM(S): at 17:18

## 2021-06-21 RX ADMIN — FAMOTIDINE 20 MILLIGRAM(S): 10 INJECTION INTRAVENOUS at 20:12

## 2021-06-21 RX ADMIN — LITHIUM CARBONATE 150 MILLIGRAM(S): 300 TABLET, EXTENDED RELEASE ORAL at 20:12

## 2021-06-21 RX ADMIN — LAMOTRIGINE 150 MILLIGRAM(S): 25 TABLET, ORALLY DISINTEGRATING ORAL at 08:11

## 2021-06-21 RX ADMIN — Medication 0.5 MILLIGRAM(S): at 08:10

## 2021-06-21 RX ADMIN — FAMOTIDINE 20 MILLIGRAM(S): 10 INJECTION INTRAVENOUS at 08:10

## 2021-06-21 RX ADMIN — Medication 0.5 MILLIGRAM(S): at 20:12

## 2021-06-21 RX ADMIN — QUETIAPINE FUMARATE 50 MILLIGRAM(S): 200 TABLET, FILM COATED ORAL at 22:30

## 2021-06-21 RX ADMIN — LAMOTRIGINE 150 MILLIGRAM(S): 25 TABLET, ORALLY DISINTEGRATING ORAL at 20:12

## 2021-06-21 NOTE — BH SAFETY PLAN - DISTRACTION PLACE 2
Another place that I would go to that would provide distraction for me would be to take a walk around.

## 2021-06-21 NOTE — CHART NOTE - NSCHARTNOTEFT_GEN_A_CORE
Social Work Admit Note:    Patient is 46 years of age female who was admitted for evaluation after calling the suicide hotline. Patient has a history of mental illness with multiple IPP admissions. Patient indicates she was arguing with someone on facebook and it sent her over the edge. Patient reports she took a picture of various pills and texted it to a friend with a caption along the lines of "I should do this".  When Pt did not get the response she wanted from her friend she called the suicide hotline. Patient then states she did not  response she wanted from suicide hotline so she took herself to the ED. Patient presents with pressured speech, bizarre affect and is tangential. Patient is somatic and anxious. Patient reports a prior attempt where she cut herself with pieces of glass about her neck not requiring any medical attention. Patient has a diagnosis of biploar disorder. Patient presents with cluster B traits. Patient is active n treatment at  [facility] adult ambulatory care clinic, Dr Amaro. In the community patient resides in supportive housing,  Selam Stinsonjames @ [facility]. Patient reports she is single never  no kids no work history currently on disability.      Sexual History – patient identifies as heterosexual. 	  Family relationships and history – Patient is  with two children.  She reports good relations with family members.    Leisure Activity Assessment – spending time with her family and children.       Community Supports – No known attendance in any self- help groups or other organizations.   Employment – patient is not employed at this time.   Substance Use Assessment – use of alcohol or other substances denied.     History of suicidality or self- injurious behaviors – history of cutting.      Significant Loses – None identified.    Life Goals – patient verbalized goal of possible return to work in the future.     will continue to meet with patient 1:1 and with treatment team daily.  Discharge plan is for continued mental health treatment in outpatient setting.    Please refer to Social Work Psychosocial for additional information. Social Work Admit Note:    Patient is 46 years of age female who was admitted for evaluation after calling the suicide hotline. Patient has a history of mental illness with multiple IPP admissions. Patient indicates she was arguing with someone on facebook and it sent her over the edge. Patient reports she took a picture of various pills and texted it to a friend with a caption along the lines of "I should do this".  When Pt did not get the response she wanted from her friend she called the suicide hotline. Patient then states she did not  response she wanted from suicide hotline so she took herself to the ED. Patient presents with pressured speech, bizarre affect and is tangential. Patient is somatic and anxious. Patient reports a prior attempt where she cut herself with pieces of glass about her neck not requiring any medical attention. Patient has a diagnosis of biploar disorder. Patient presents with cluster B traits. Patient is active n treatment at  [facility] adult ambulatory care clinic, Dr Amaro. In the community patient resides in supportive housing,  Selam Jannette @ [facility]. Patient reports she is single never  no kids no work history currently on disability.      Sexual History – patient identifies as heterosexual. 	  Family relationships and history – patient is single reports strained relations with family members.    Leisure Activity Assessment – spending time with friends.       Community Supports – [facility].   Employment – patient is disabled.   Substance Use Assessment – use of alcohol or other substances denied.     History of suicidality or self- injurious behaviors – history of prior attempt.      Significant Loses – none identified.    Life Goals – " I don't know"       will continue to meet with patient 1:1 and with treatment team daily.      Discharge plan is for continued mental health treatment in outpatient setting.      Please refer to Social Work Psychosocial for additional information.

## 2021-06-21 NOTE — BH SAFETY PLAN - WARNING SIGN 3
Also another warning sign when I am getting manic would be that I know when I am going manic. I have been through this most of my life.

## 2021-06-21 NOTE — BH SAFETY PLAN - LOCAL URGENT CARE ADDRESS
[facility] is located on the first floor of the [facility], within the campus of [facility]. The main entrance to our building is at the corner of [address] [place] [address] and [address] [place] [address] in [place], [place]. You can also access our campus through the hospital entrance at [phone] [place] [address]

## 2021-06-21 NOTE — PROGRESS NOTE BEHAVIORAL HEALTH - NSBHCHARTREVIEWINVESTIGATE_PSY_A_CORE FT
< from: 12 Lead ECG (05.17.21 @ 01:23) >    QTC Calculation(Bazett) 443 ms    < end of copied text >

## 2021-06-21 NOTE — PROGRESS NOTE BEHAVIORAL HEALTH - SUMMARY
45 yo single white female, non-caregiver, disabled, [facility] [place] since 12/[facility], with history of Borderline Personality Disorder, Psychogenic Seizures, Bipolar Disorder and post-traumatic stress disorder, history of suicide attempt vs gesture - superficially cut her neck with glass/did not require sutures/no scars in 2014, PMHx of endometriosis , no history of substance abuse, history of trauma, no legal issues or access to firearms, BIB EMS after she called 911 "by mistake"; presents with manic episode. Pt presenting with some manic symptoms, including pressured speech, flight of ideas, tangential thought process, elated mood. She admits to racing thoughts, not sleeping and feeling "manic". She endorsed suicidal thoughts earlier, calling the suicide hotline, denies at presents, ambivalent about be able to safety plan and needing inpatient admission. At this time will continue pursuing admission for safety and stabilization.    Upon re-evaluation today, patient continues to present manic, with elated mood, hyperverbal speech and circumstantial thought process. At this time, will continue to optimize medications and monitor for stabilization and safety. No suicidality endorsed on exam.     #Bipolar disorder; MRE judson   -continue with Lamictal 150mg po BID   -continue with Seroquel     #PRN  For acute agitation not amenable to verbal redirection give haldol 5mg po, benadryl 50mg, ativan 2 mg po q6 with escalation to IM if patient is danger to self/other; if IM formulation used please order repeat EKG to ensure qtc <500ms 47 yo single white female, non-caregiver, disabled, domiciled [facility] in [place] since 12/2014, with history of Borderline Personality Disorder, Psychogenic Seizures, Bipolar Disorder and post-traumatic stress disorder, history of suicide attempt vs gesture - superficially cut her neck with glass/did not require sutures/no scars in 2014, PMHx of endometriosis , no history of substance abuse, history of trauma, no legal issues or access to firearms, BIB EMS after she called 911 "by mistake"; presents with manic episode. Pt presenting with some manic symptoms, including pressured speech, flight of ideas, tangential thought process, elated mood. She admits to racing thoughts, not sleeping and feeling "manic". She endorsed suicidal thoughts earlier, calling the suicide hotline, denies at presents, ambivalent about be able to safety plan and needing inpatient admission. At this time will continue pursuing admission for safety and stabilization.    Upon re-evaluation today, patient continues to present manic, with elated mood, hyperverbal speech and circumstantial thought process. At this time, will continue to optimize medications and monitor for stabilization and safety. No acute suicidality on exam.     #Bipolar disorder; MRE ujdson   -continue with Lamictal 150mg po BID   -Start Lithium 150mg po BID   -Seroquel 50mg po PRN for insomnia   -Atarax 25mg po PRN for anxiety/insomnia   -continue with home medication Klonopin 0.5mg po BID     #PRN  For acute agitation not amenable to verbal redirection give haldol 5mg po, benadryl 50mg, ativan 2 mg po q6 with escalation to IM if patient is danger to self/other; if IM formulation used please order repeat EKG to ensure qtc <500ms 47 yo single white female, non-caregiver, disabled, domiciled [facility] in [place] since 12/2014, with history of Borderline Personality Disorder, Psychogenic Seizures, mood disorder unspecified , history of suicide attempt vs gesture - superficially cut her neck with glass/did not require sutures/no scars in 2014, PMHx of endometriosis , no history of substance abuse, history of trauma, no legal issues or access to firearms, BIB EMS after she called 911 "by mistake"; presents with manic episode. Pt presenting with some manic symptoms, including pressured speech, flight of ideas, tangential thought process, elated mood. She admits to racing thoughts, not sleeping and feeling "manic". She endorsed suicidal thoughts earlier, calling the suicide hotline, denies at presents, ambivalent about be able to safety plan and needing inpatient admission. At this time will continue pursuing admission for safety and stabilization.    Upon re-evaluation today, patient continues to present with mood lability, hyperverbal speech and circumstantial thought process. At this time, will continue to optimize medications and monitor for stabilization and safety. No acute suicidality on exam.     #Mood disorder NOS   #Borderline personality disorder   -continue with Lamictal 150mg po BID   -Start Lithium 150mg po BID for impulsivity, suicide-preventing effects   -Seroquel 50mg po PRN for insomnia   -Atarax 25mg po PRN for anxiety/insomnia   -continue with home medication Klonopin 0.5mg po BID     #PRN  For acute agitation not amenable to verbal redirection give haldol 5mg po, benadryl 50mg, ativan 2 mg po q6 with escalation to IM if patient is danger to self/other; if IM formulation used please order repeat EKG to ensure qtc <500ms

## 2021-06-21 NOTE — PROGRESS NOTE BEHAVIORAL HEALTH - NSBHFUPINTERVALHXFT_PSY_A_CORE
Chart reviewed, pt seen and examined at team meeting. As per staff, no acute overnight event, no PRN received.     Upon evaluation, pt cooperative, but hyperverbal and circumstantial in thought process. She reports that she called 911 promoting ED visit stating that she "found myself suicidal." States that on the day of her ED visit, she reached out to a friend to talk to and had even sent him a photo of pills, stating she poured out "2 bottles of pills" one of which was Klonopin. She reports that she had no intent of taking the pills, stating "I didn't even touch them" but rather that she "just wanted to send a message" as she felt that her friend did not understand the severity of her thoughts. States she then decided to call the suicide hotline, knowing that she can even "fast track" getting to someone by "pressing 1", she however felt that took too long and then decided to call 911. States that the moment she went to the hospital, her suicidal ideations resolved, stating that she was "relieved" and states that she has been feeling well since being admitted. She however states that she sill feels "manic" as she feels like she's been talking "non-stop" and not sleeping well for several weeks, although she reports feeling well now after receiving PRN in the ED. Pt is additionally fixated on symptoms that she appears to be sequale of COVID, including vertigo. PT has been compliant with Lamictal and her outpatient follow up treatment prior to admission. Patient has been compliant with medication while on the unit, denies negative side effects. Endorsed fair appetite/sleep. Denied A/V hallucinations. Denied paranoia. Denied active suicidal/homicidal ideation, intent or plan. Chart reviewed, pt seen and examined at team meeting. As per staff, no acute overnight event, no PRN received.     Upon evaluation, pt cooperative, but hyperverbal and circumstantial in thought process. She reports that she called 911 promoting ED visit stating that she "found myself suicidal." States that on the day of her ED visit, she reached out to a friend to talk to and had even sent him a photo of pills, stating she poured out "2 bottles of pills" one of which was Klonopin. She reports that she had no intent of taking the pills, stating "I didn't even touch them" but rather that she "just wanted to send a message" as she felt that her friend did not understand the severity of her thoughts. States she then decided to call the suicide hotline, knowing that she can even "fast track" getting to someone by "pressing 1", she however felt that they "asked to many questions" and took too long so she hung up and called 911. States that the moment she went to the hospital, her suicidal ideations resolved, stating that she was "relieved" and states that she has been feeling well since being admitted. She however states that she sill feels "manic" as she feels like she's been talking "non-stop" and not sleeping well for several weeks, although she reports feeling well now after receiving PRN in the ED. Pt is additionally fixated on symptoms that she appears to be sequale of COVID, including vertigo. PT has been compliant with Lamictal and her outpatient follow up treatment prior to admission. She also later disclosed that she has been on Prozac 5mg for the last 1.5 year. Patient has been compliant with medication while on the unit, denies negative side effects. Endorsed fair appetite/sleep. Denied A/V hallucinations. Denied paranoia. Denied active suicidal/homicidal ideation, intent or plan. Chart reviewed, pt seen and examined at team meeting. As per staff, no acute overnight event, no PRN received.     Upon evaluation, pt cooperative, but hyperverbal and circumstantial in thought process. She reports that she called 911 promoting ED visit stating that she "found myself suicidal." States that on the day of her ED visit, she reached out to a friend to talk to and had even sent him a photo of pills, stating she poured out "2 bottles of pills" one of which was Klonopin. She reports that she had no intent of taking the pills, stating "I didn't even touch them" but rather that she "just wanted to send a message" as she felt that her friend did not understand the severity of her thoughts. States she then decided to call the suicide hotline, knowing that she can even "fast track" getting to someone by "pressing 1", she however felt that they "asked to many questions" and took too long so she hung up and called 911. States that the moment she went to the hospital, her suicidal ideations resolved, stating that she was "relieved" and states that she has been feeling well since being admitted. She however states that she sill feels "manic" as she feels like she's been talking "non-stop" and not sleeping well for several weeks, although she reports feeling well now after receiving PRN in the ED. Pt is additionally fixated on symptoms that she appears to be sequale of COVID, including vertigo. PT has been compliant with Lamictal and her outpatient follow up treatment prior to admission. She also later disclosed that she has been on Prozac 5mg for the last 1.5 year. She additionally endorses medical marijuana use, which she uses for her chronic pain.  Patient has been compliant with medication while on the unit, denies negative side effects. Endorsed fair appetite/sleep. Denied A/V hallucinations. Denied paranoia. Denied active suicidal/homicidal ideation, intent or plan.

## 2021-06-21 NOTE — BH SAFETY PLAN - THE ONE THING THAT IS MOST IMPORTANT TO ME AND WORTH LIVING FOR IS:
The one thing that is most important to me and worth living for would be saving lives and helping out people as much as I can.

## 2021-06-22 LAB
COVID-19 SPIKE DOMAIN AB INTERP: POSITIVE
COVID-19 SPIKE DOMAIN ANTIBODY RESULT: >250 U/ML — HIGH
SARS-COV-2 IGG+IGM SERPL QL IA: >250 U/ML — HIGH
SARS-COV-2 IGG+IGM SERPL QL IA: POSITIVE
TSH SERPL-MCNC: 1 UIU/ML — SIGNIFICANT CHANGE UP (ref 0.27–4.2)

## 2021-06-22 PROCEDURE — 99232 SBSQ HOSP IP/OBS MODERATE 35: CPT

## 2021-06-22 RX ORDER — MECLIZINE HCL 12.5 MG
25 TABLET ORAL EVERY 8 HOURS
Refills: 0 | Status: DISCONTINUED | OUTPATIENT
Start: 2021-06-22 | End: 2021-06-25

## 2021-06-22 RX ADMIN — Medication 25 MILLIGRAM(S): at 10:17

## 2021-06-22 RX ADMIN — LITHIUM CARBONATE 150 MILLIGRAM(S): 300 TABLET, EXTENDED RELEASE ORAL at 20:14

## 2021-06-22 RX ADMIN — LAMOTRIGINE 150 MILLIGRAM(S): 25 TABLET, ORALLY DISINTEGRATING ORAL at 08:35

## 2021-06-22 RX ADMIN — QUETIAPINE FUMARATE 50 MILLIGRAM(S): 200 TABLET, FILM COATED ORAL at 23:44

## 2021-06-22 RX ADMIN — LITHIUM CARBONATE 150 MILLIGRAM(S): 300 TABLET, EXTENDED RELEASE ORAL at 08:35

## 2021-06-22 RX ADMIN — Medication 25 MILLIGRAM(S): at 11:28

## 2021-06-22 RX ADMIN — Medication 25 MILLIGRAM(S): at 20:59

## 2021-06-22 RX ADMIN — LAMOTRIGINE 150 MILLIGRAM(S): 25 TABLET, ORALLY DISINTEGRATING ORAL at 20:14

## 2021-06-22 RX ADMIN — FAMOTIDINE 20 MILLIGRAM(S): 10 INJECTION INTRAVENOUS at 08:35

## 2021-06-22 RX ADMIN — FAMOTIDINE 20 MILLIGRAM(S): 10 INJECTION INTRAVENOUS at 20:14

## 2021-06-22 RX ADMIN — Medication 0.5 MILLIGRAM(S): at 20:14

## 2021-06-22 RX ADMIN — Medication 0.5 MILLIGRAM(S): at 08:35

## 2021-06-22 NOTE — CONSULT NOTE ADULT - SUBJECTIVE AND OBJECTIVE BOX
HPI:  45 y/o F hx Bipolar D/O , Seizures, PTSD  BIBA secondary to agitation and judson .  Admit to suicidal ideations secondary to a verbal altercation with Jews on face book.  Presents internally preoccupied , expressing a flights of ideas. Denies AH/HI/VH. Denies falling, punching or kicking any objects. Denies pain, SOB, fever, chills,  chest/abdominal discomfort. Denies use of alcohol or illicit drugs. No evidence of physical injuries, broken skin or deformities. Admits to medication compliance.    Presenting Symptoms in ER on 6/19 : AGITATION, ANXIETY, IRRITABILITY, PARANOIA, SUICIDAL    Pt seen, calm and denies any medical complaints          (20 Jun 2021 22:42)    PAST MEDICAL & SURGICAL HISTORY:  Epilepsy    High cholesterol    Endometriosis    Genital herpes    PTSD (post-traumatic stress disorder)    Major depression    Bipolar 1 disorder    Seizures    No significant past surgical history      MEDICATIONS  (STANDING):  clonazePAM  Tablet 0.5 milliGRAM(s) Oral two times a day  famotidine    Tablet 20 milliGRAM(s) Oral two times a day  lamoTRIgine 150 milliGRAM(s) Oral two times a day  lithium 150 milliGRAM(s) Oral two times a day    MEDICATIONS  (PRN):  hydrOXYzine hydrochloride 25 milliGRAM(s) Oral every 6 hours PRN anxiety and agitation  meclizine 25 milliGRAM(s) Oral every 8 hours PRN vertigo  QUEtiapine 50 milliGRAM(s) Oral at bedtime PRN insomnia  QUEtiapine 50 milliGRAM(s) Oral at bedtime PRN insomnia      ICU Vital Signs Last 24 Hrs  T(C): 36.3 (22 Jun 2021 05:41), Max: 36.3 (22 Jun 2021 05:41)  T(F): 97.3 (22 Jun 2021 05:41), Max: 97.3 (22 Jun 2021 05:41)  HR: 78 (22 Jun 2021 05:41) (78 - 78)  BP: 98/62 (22 Jun 2021 05:41) (98/62 - 98/62)  BP(mean): --  ABP: --  ABP(mean): --  RR: 20 (22 Jun 2021 05:41) (20 - 20)  SpO2: --  Pe  nad  [id number]  [id number]  ctabl  [id number]  nocce

## 2021-06-22 NOTE — PROGRESS NOTE BEHAVIORAL HEALTH - NSBHADMITIPBHPROVFT_PSY_A_CORE
left VM with callback for outpatient psychiatrist Dr. Lemus [phone] discussed with outpatient psychiatrist Dr. Lemus [phone]

## 2021-06-22 NOTE — PROGRESS NOTE BEHAVIORAL HEALTH - SUMMARY
45 yo single white female, non-caregiver, disabled, domiciled [facility] in [place] since 12/2014, with history of Borderline Personality Disorder, Psychogenic Seizures, mood disorder unspecified , history of suicide attempt vs gesture - superficially cut her neck with glass/did not require sutures/no scars in 2014, PMHx of endometriosis , no history of substance abuse, history of trauma, no legal issues or access to firearms, BIB EMS after she called 911 "by mistake"; presents with manic episode. Pt presenting with some manic symptoms, including pressured speech, flight of ideas, tangential thought process, elated mood. She admits to racing thoughts, not sleeping and feeling "manic". She endorsed suicidal thoughts earlier, calling the suicide hotline, denies at presents, ambivalent about be able to safety plan and needing inpatient admission. At this time will continue pursuing admission for safety and stabilization.    Upon re-evaluation today, patient continues to report resolution of suicidal ideations and improvement in mood lability and sleep. Pt remains somewhat anxious and somatic. She however does not appear to be an acute danger to self/other and is anticipated for discharge on Thursday.     #Mood disorder NOS   #Borderline personality disorder   -continue with Lamictal 150mg po BID   -continue Lithium 150mg po BID for impulsivity, suicide-preventing effects   -Seroquel 50mg po PRN for insomnia   -Atarax 25mg po PRN for anxiety/insomnia   -continue with home medication Klonopin 0.5mg po BID     #PRN  For acute agitation not amenable to verbal redirection give haldol 5mg po, benadryl 50mg, ativan 2 mg po q6 with escalation to IM if patient is danger to self/other; if IM formulation used please order repeat EKG to ensure qtc <500ms 45 yo single white female, non-caregiver, disabled, domiciled [facility] in [place] since 12/2014, with history of Borderline Personality Disorder, Psychogenic Seizures, mood disorder unspecified , history of suicide attempt vs gesture - superficially cut her neck with glass/did not require sutures/no scars in 2014, PMHx of endometriosis , no history of substance abuse, history of trauma, no legal issues or access to firearms, BIB EMS after she called 911 "by mistake"; presents with manic episode. Pt presenting with some manic symptoms, including pressured speech, flight of ideas, tangential thought process, elated mood. She admits to racing thoughts, not sleeping and feeling "manic". She endorsed suicidal thoughts earlier, calling the suicide hotline, denies at presents, ambivalent about be able to safety plan and needing inpatient admission. At this time will continue pursuing admission for safety and stabilization.    Upon re-evaluation today, patient continues to report resolution of suicidal ideations and improvement in mood lability and sleep. No pressured speech, grandiosity appreciated. Pt remains somewhat anxious and somatic. She however does not appear to be acutely manic or suicidal and is not an acute danger to self/other. She is anticipated for discharge on Thursday.     #Mood disorder NOS   #Cluster B traits   -continue with Lamictal 150mg po BID   -continue Lithium 150mg po BID for impulsivity, suicide-preventing effects   -Seroquel 50mg po PRN for insomnia   -Atarax 25mg po PRN for anxiety/insomnia   -continue with home medication Klonopin 0.5mg po BID     #PRN  For acute agitation not amenable to verbal redirection give haldol 5mg po, benadryl 50mg, ativan 2 mg po q6 with escalation to IM if patient is danger to self/other; if IM formulation used please order repeat EKG to ensure qtc <500ms

## 2021-06-22 NOTE — PROGRESS NOTE BEHAVIORAL HEALTH - NSBHFUPINTERVALHXFT_PSY_A_CORE
Chart reviewed, pt seen and examined at bedside. Pt received PRN Seroquel, atarax for sleep last night. No acute overnight events.     Upon evaluation, pt linear, goal directed, no flight of ideas or pressured speech observed. She continues to report that she feels "manic" stating that she feels she is talking "mile a minute." Although speech appears to be normal in rate and volume.  She however states that she has been sleeping well on the unit. She continues to report vertigo, which she states that been going on for a couple weeks now. She is agreeable to PRN meclizine for above. She continues to deny suicidal ideations, stating "I stopped feeling suicidal the moment I called 911." Otherwise, no new complaints. Patient has been compliant with medication, denies negative side effects. Endorsed fair appetite/sleep. Denied A/V hallucinations. Denied paranoia. Denied active suicidal/homicidal ideation, intent or plan. Chart reviewed, pt seen and examined at bedside. Pt received PRN Seroquel, atarax for sleep last night. No acute overnight events.     Upon evaluation, pt linear, goal directed, no flight of ideas or pressured speech observed. She continues to report that she feels "manic" stating that she feels she is talking "mile a minute." Although speech appears to be normal in rate and volume.  She has been sleeping well on the unit. She continues to report vertigo, which she states that been going on for a couple weeks now. She is agreeable to PRN meclizine for above. She continues to deny suicidal ideations, stating "I stopped feeling suicidal the moment I called 911." Otherwise, no new complaints. Patient has been compliant with medication, denies negative side effects. Endorsed fair appetite/sleep. Denied A/V hallucinations. Denied paranoia. Denied active suicidal/homicidal ideation, intent or plan.

## 2021-06-22 NOTE — CONSULT NOTE ADULT - ASSESSMENT
#bipolar  #PTSD  #MDD    medication adjustment per psych                          14.5   7.54  )-----------( 283      ( 21 Jun 2021 09:21 )             43.7   06-21    139  |  105  |  13  ----------------------------<  153<H>  4.7   |  27  |  0.9    Ca    10.5<H>      21 Jun 2021 09:21  Mg     2.0     06-21    TPro  7.2  /  Alb  4.2  /  TBili  0.3  /  DBili  x   /  AST  16  /  ALT  8   /  AlkPhos  79  06-21      < from: 12 Lead ECG (06.21.21 @ 12:13) >  QTC Calculation(Bazett) 430 ms    P Axis 60 degrees    R Axis 48 degrees    T Axis 41 degrees    Diagnosis Line Normal sinus rhythm  Normal ECG    < end of copied text >      recall prn  #bipolar  #PTSD  #MDD  #epilepsy - last seizure 2 years ago - on lamictal    she is worried about side effects of lithium   medication adjustment per psych                          14.5   7.54  )-----------( 283      ( 21 Jun 2021 09:21 )             43.7   06-21    139  |  105  |  13  ----------------------------<  153<H>  4.7   |  27  |  0.9    Ca    10.5<H>      21 Jun 2021 09:21  Mg     2.0     06-21    TPro  7.2  /  Alb  4.2  /  TBili  0.3  /  DBili  x   /  AST  16  /  ALT  8   /  AlkPhos  79  06-21      < from: 12 Lead ECG (06.21.21 @ 12:13) >  QTC Calculation(Bazett) 430 ms    P Axis 60 degrees    R Axis 48 degrees    T Axis 41 degrees    Diagnosis Line Normal sinus rhythm  Normal ECG    < end of copied text >      recall prn

## 2021-06-23 PROCEDURE — 99232 SBSQ HOSP IP/OBS MODERATE 35: CPT

## 2021-06-23 RX ADMIN — Medication 0.5 MILLIGRAM(S): at 20:20

## 2021-06-23 RX ADMIN — Medication 0.5 MILLIGRAM(S): at 08:05

## 2021-06-23 RX ADMIN — LAMOTRIGINE 150 MILLIGRAM(S): 25 TABLET, ORALLY DISINTEGRATING ORAL at 20:18

## 2021-06-23 RX ADMIN — Medication 25 MILLIGRAM(S): at 08:04

## 2021-06-23 RX ADMIN — FAMOTIDINE 20 MILLIGRAM(S): 10 INJECTION INTRAVENOUS at 20:18

## 2021-06-23 RX ADMIN — QUETIAPINE FUMARATE 50 MILLIGRAM(S): 200 TABLET, FILM COATED ORAL at 22:03

## 2021-06-23 RX ADMIN — Medication 500 MILLIGRAM(S): at 19:30

## 2021-06-23 RX ADMIN — FAMOTIDINE 20 MILLIGRAM(S): 10 INJECTION INTRAVENOUS at 08:04

## 2021-06-23 RX ADMIN — LITHIUM CARBONATE 150 MILLIGRAM(S): 300 TABLET, EXTENDED RELEASE ORAL at 20:18

## 2021-06-23 RX ADMIN — LAMOTRIGINE 150 MILLIGRAM(S): 25 TABLET, ORALLY DISINTEGRATING ORAL at 08:03

## 2021-06-23 RX ADMIN — Medication 500 MILLIGRAM(S): at 18:47

## 2021-06-23 RX ADMIN — LITHIUM CARBONATE 150 MILLIGRAM(S): 300 TABLET, EXTENDED RELEASE ORAL at 08:03

## 2021-06-23 NOTE — CHART NOTE - NSCHARTNOTEFT_GEN_A_CORE
Writer met with patient to discuss discharge plan. Patient states she would like to discharge before the weekend. Patient receives psychiatric services from Dr Lemus and Addie Salcido at [facility] . Patient receives Health Home Care Coordination from [facility]  [phone]. Patient receives supportive housing services from [facility] .     Writer left messages with above outpatient treatment team to discuss and plan for discharge. Discharge planning ensues.

## 2021-06-23 NOTE — PROGRESS NOTE BEHAVIORAL HEALTH - NSBHFUPINTERVALCCFT_PSY_A_CORE
Pt states she is much better and is asking about discharge.  She states she no longer feels her manic, and is not suicidal.  No psychosis or somatic c/o.

## 2021-06-24 LAB — LITHIUM SERPL-MCNC: 0.2 MMOL/L — LOW (ref 0.6–1.2)

## 2021-06-24 PROCEDURE — 99232 SBSQ HOSP IP/OBS MODERATE 35: CPT

## 2021-06-24 RX ORDER — CLONAZEPAM 1 MG
1 TABLET ORAL
Qty: 0 | Refills: 0 | DISCHARGE
Start: 2021-06-24

## 2021-06-24 RX ORDER — HYDROXYZINE HCL 10 MG
1 TABLET ORAL
Qty: 28 | Refills: 0
Start: 2021-06-24 | End: 2021-07-07

## 2021-06-24 RX ORDER — FAMOTIDINE 10 MG/ML
1 INJECTION INTRAVENOUS
Qty: 0 | Refills: 0 | DISCHARGE
Start: 2021-06-24

## 2021-06-24 RX ORDER — MECLIZINE HCL 12.5 MG
1 TABLET ORAL
Qty: 28 | Refills: 0
Start: 2021-06-24 | End: 2021-07-07

## 2021-06-24 RX ORDER — LITHIUM CARBONATE 300 MG/1
1 TABLET, EXTENDED RELEASE ORAL
Qty: 28 | Refills: 0
Start: 2021-06-24 | End: 2021-07-07

## 2021-06-24 RX ORDER — QUETIAPINE FUMARATE 200 MG/1
1 TABLET, FILM COATED ORAL
Qty: 0 | Refills: 0 | DISCHARGE
Start: 2021-06-24

## 2021-06-24 RX ORDER — IBUPROFEN 200 MG
400 TABLET ORAL EVERY 4 HOURS
Refills: 0 | Status: DISCONTINUED | OUTPATIENT
Start: 2021-06-24 | End: 2021-06-25

## 2021-06-24 RX ORDER — LAMOTRIGINE 25 MG/1
1 TABLET, ORALLY DISINTEGRATING ORAL
Qty: 28 | Refills: 0
Start: 2021-06-24 | End: 2021-07-07

## 2021-06-24 RX ADMIN — LITHIUM CARBONATE 150 MILLIGRAM(S): 300 TABLET, EXTENDED RELEASE ORAL at 20:01

## 2021-06-24 RX ADMIN — FAMOTIDINE 20 MILLIGRAM(S): 10 INJECTION INTRAVENOUS at 08:35

## 2021-06-24 RX ADMIN — QUETIAPINE FUMARATE 50 MILLIGRAM(S): 200 TABLET, FILM COATED ORAL at 22:28

## 2021-06-24 RX ADMIN — Medication 25 MILLIGRAM(S): at 18:39

## 2021-06-24 RX ADMIN — LITHIUM CARBONATE 150 MILLIGRAM(S): 300 TABLET, EXTENDED RELEASE ORAL at 08:35

## 2021-06-24 RX ADMIN — Medication 25 MILLIGRAM(S): at 07:11

## 2021-06-24 RX ADMIN — LAMOTRIGINE 150 MILLIGRAM(S): 25 TABLET, ORALLY DISINTEGRATING ORAL at 20:01

## 2021-06-24 RX ADMIN — FAMOTIDINE 20 MILLIGRAM(S): 10 INJECTION INTRAVENOUS at 20:01

## 2021-06-24 RX ADMIN — LAMOTRIGINE 150 MILLIGRAM(S): 25 TABLET, ORALLY DISINTEGRATING ORAL at 08:35

## 2021-06-24 RX ADMIN — Medication 0.5 MILLIGRAM(S): at 20:01

## 2021-06-24 RX ADMIN — Medication 0.5 MILLIGRAM(S): at 08:35

## 2021-06-24 NOTE — CHART NOTE - NSCHARTNOTEFT_GEN_A_CORE
Patient remains on unit for continued treatment safety and observation. Patient is visible on unit and compliant with treatment as well as unit protocol. Writer meets with patient daily on rounds and or team meeting. Writer provides support and education to the patient daily. Patient to remain on unit until cleared by attending for discharge. Patient denies suicidal ideation, homicidal ideation and presents with no evidence of audio visual hallucinations. Activities of daily living are within normal limits. Patient remains somatic and with cluster B traits. Patient considered for discharge tomorrow providing no regressive behaviors. Discharge planning ensues. Patient states she is eager to discharge and go home. Patient contracts for safety and states she will be ok at home. Writer contacted outpatient treatment team to obtain follow up appointments, awaiting response. Patient receives psychiatric services from Dr Lemus and Addie Salcido at [facility] . Patient receives Health Home Care Coordination from [facility]  [phone]. Patient receives supportive housing services from [facility] .     Mental Status Exam:    Mood – Neutral  Sleep - Fair  Appetite - Good  ADLs - WNL  Thought Process – Linear    Observation – [id number]    No barriers to discharge identified at this time. Plan is for referral to patient’s private community psychiatrist.

## 2021-06-24 NOTE — DISCHARGE NOTE BEHAVIORAL HEALTH - MEDICATION SUMMARY - MEDICATIONS TO TAKE
I will START or STAY ON the medications listed below when I get home from the hospital:    clonazePAM 0.5 mg oral tablet  -- 1 tab(s) by mouth 2 times a day, until otherwise instructed by MD  -- Indication: For Anxiety    lamoTRIgine 150 mg oral tablet  -- 1 tab(s) by mouth 2 times a day, until otherwise instructed by MD  -- Indication: For Mood disorder    meclizine 25 mg oral tablet  -- 1 tab(s) by mouth 1 to 2 times a day x 14 days, As Needed -vertigo   -- Indication: For Vertigo    lithium 150 mg oral capsule  -- 1 cap(s) by mouth 2 times a day x 14 days   -- Indication: For Mood disorder    QUEtiapine 50 mg oral tablet  -- 1 tab(s) by mouth once a day (at bedtime), As needed, insomnia, until otherwise instructed by MD  -- Indication: For Insomnia     hydrOXYzine hydrochloride 25 mg oral tablet  -- 1 tab(s) by mouth 1 to 2 times a day x 14 days, As Needed -Anxiety MDD:max 2 tabl/day   -- Indication: For Anxiety/Insomnia     famotidine 20 mg oral tablet  -- 1 tab(s) by mouth 2 times a day, until otherwise instructed by MD  -- Indication: For Antacid

## 2021-06-24 NOTE — DISCHARGE NOTE BEHAVIORAL HEALTH - MEDICATION SUMMARY - MEDICATIONS TO STOP TAKING
I will STOP taking the medications listed below when I get home from the hospital:    traZODone 50 mg oral tablet  -- 1.5  by mouth once a day (at bedtime)    diphenhydrAMINE 50 mg oral capsule  -- 1 cap(s) by mouth once a day, As Needed -agitation    clonazePAM 1 mg oral tablet  -- 1 tab(s) by mouth 2 times a day MDD:max 2 tabl    sertraline 25 mg oral tablet  -- 3 tab(s) by mouth once a day

## 2021-06-24 NOTE — PROGRESS NOTE BEHAVIORAL HEALTH - SUMMARY
45 yo single white female, non-caregiver, disabled, domiciled [facility] in [place] since 12/2014, with history of Borderline Personality Disorder, Psychogenic Seizures, mood disorder unspecified , history of suicide attempt vs gesture - superficially cut her neck with glass/did not require sutures/no scars in 2014, PMHx of endometriosis , no history of substance abuse, history of trauma, no legal issues or access to firearms, BIB EMS after she called 911 "by mistake"; presents with manic episode. Pt presenting with some manic symptoms, including pressured speech, flight of ideas, tangential thought process, elated mood. She admits to racing thoughts, not sleeping and feeling "manic". She endorsed suicidal thoughts earlier, calling the suicide hotline, denies at presents, ambivalent about be able to safety plan and needing inpatient admission. At this time will continue pursuing admission for safety and stabilization.    Upon re-evaluation pt no longer exhibits elated mood, pressured speech or distractibility. She remains  anxious and somatic. She however does not appear to be acutely manic or suicidal and is not an acute danger to self/other.     #Mood disorder NOS   #Cluster B traits   -continue with Lamictal 150mg po BID   -continue Lithium 150mg po BID for impulsivity, suicide-preventing effects   -Seroquel 50mg po PRN for insomnia   -Atarax 25mg po PRN for anxiety/insomnia   -continue with home medication Klonopin 0.5mg po BID     #PRN  For acute agitation not amenable to verbal redirection give haldol 5mg po, benadryl 50mg, ativan 2 mg po q6 with escalation to IM if patient is danger to self/other; if IM formulation used please order repeat EKG to ensure qtc <500ms 45 yo single white female, non-caregiver, disabled, domiciled [facility] in [place] since 12/2014, with history of Borderline Personality Disorder, Psychogenic Seizures, mood disorder unspecified , history of suicide attempt vs gesture - superficially cut her neck with glass/did not require sutures/no scars in 2014, PMHx of endometriosis , no history of substance abuse, history of trauma, no legal issues or access to firearms, BIB EMS after she called 911 "by mistake"; presents with manic episode. Pt presenting with some manic symptoms, including pressured speech, flight of ideas, tangential thought process, elated mood. She admits to racing thoughts, not sleeping and feeling "manic". She endorsed suicidal thoughts earlier, calling the suicide hotline, denies at presents, ambivalent about be able to safety plan and needing inpatient admission. At this time will continue pursuing admission for safety and stabilization.    Upon re-evaluation pt no longer exhibits elated mood, pressured speech or distractibility. She remains  anxious and somatic. She however does not appear to be acutely manic or suicidal and is not an acute danger to self/other. Patient is anticipated for discharge tomorrow.     #Mood disorder NOS   #Cluster B traits   -continue with Lamictal 150mg po BID   -continue Lithium 150mg po BID for impulsivity, suicide-preventing effects   -Seroquel 50mg po PRN for insomnia   -Atarax 25mg po PRN for anxiety/insomnia   -continue with home medication Klonopin 0.5mg po BID     #PRN  For acute agitation not amenable to verbal redirection give haldol 5mg po, benadryl 50mg, ativan 2 mg po q6 with escalation to IM if patient is danger to self/other; if IM formulation used please order repeat EKG to ensure qtc <500ms

## 2021-06-24 NOTE — DISCHARGE NOTE BEHAVIORAL HEALTH - NSBHDCRESPONSEFT_PSY_A_CORE
Patient has made significant progress over the course of hospitalization. With continuous psychotherapy from the treatment team and medications patient reports feeling better, sleeping, and eating well. Thought process and insight improved. Patient was calm and cooperative and was in good behavioral control. Patient denied any suicidal or homicidal ideations. Patient denied any auditory of visual hallucinations. Patient was more visible on the unit, attending groups. Pt was evaluated by treatment team, pt is stable for discharge and shows no imminent risk to self other or property at this item.

## 2021-06-24 NOTE — PROGRESS NOTE BEHAVIORAL HEALTH - NSBHFUPINTERVALHXFT_PSY_A_CORE
Chart reviewed, pt seen and examined at bedside. No acute overnight events, pt received PRN meclizine, Seroquel 50mg for sleep last night.     Upon evaluation, pt continues to be somatically preoccupied. She continues to express anxiety about long haul COVID symptoms. She however is now is unsure if she is ready to be discharged stating passive SI Chart reviewed, pt seen and examined at bedside. No acute overnight events, pt received PRN meclizine for vertigo, Seroquel 50mg for sleep last night.     Upon evaluation, pt continues to be somatically preoccupied. She continues to express anxiety about long haul COVID symptoms. She has been visible on the unit, in the day room interacting with staff/peers. She continues to make needs met. No pressure speech noted. She remains linear, goal directed.  Patient has been compliant with medication, denies negative side effects. Endorsed fair appetite/sleep. Denied A/V hallucinations. Denied paranoia. Denied active suicidal/homicidal ideation, intent or plan. Chart reviewed, pt seen and examined at bedside. No acute overnight events, pt received PRN meclizine for vertigo, Seroquel 50mg for sleep last night.     Upon evaluation, pt continues to be somatically preoccupied. She continues to express anxiety about long haul COVID symptoms. She has been visible on the unit, in the day room interacting with staff/peers. She continues to make needs met. No pressured speech noted. She remains linear, goal directed.  Appears future oriented, stating that she looks forwards to "eating" and having her own "bathroom" on discharge. Patient has been compliant with medication, denies negative side effects. Endorsed fair appetite/sleep. Denied A/V hallucinations. Denied paranoia. Denied active suicidal/homicidal ideation, intent or plan.

## 2021-06-24 NOTE — DISCHARGE NOTE BEHAVIORAL HEALTH - NSBHDCSUICPROTECTFT_PSY_A_CORE
Admit,  IR  ABX  Thoracic surgery consult  NGT if N/V    Pt seen on 7/1 medication adherence, engagement in weekly therapy, utilizer of inpatient/ED services during times of crisis, help seeking behaviors, no known history of substance abuse

## 2021-06-24 NOTE — PROGRESS NOTE BEHAVIORAL HEALTH - NSBHCHARTREVIEWVS_PSY_A_CORE FT
ICU Vital Signs Last 24 Hrs  T(C): 36.7 (21 Jun 2021 09:24), Max: 36.8 (20 Jun 2021 18:52)  T(F): 98.1 (21 Jun 2021 09:24), Max: 98.2 (20 Jun 2021 18:52)  HR: 91 (21 Jun 2021 09:24) (78 - 115)  BP: 117/62 (21 Jun 2021 09:24) (99/66 - 124/95)  BP(mean): --  ABP: --  ABP(mean): --  RR: 15 (21 Jun 2021 09:24) (15 - 20)  SpO2: 100% (20 Jun 2021 18:52) (99% - 100%)
ICU Vital Signs Last 24 Hrs  T(C): 36.3 (22 Jun 2021 05:41), Max: 36.8 (21 Jun 2021 16:00)  T(F): 97.3 (22 Jun 2021 05:41), Max: 98.3 (21 Jun 2021 16:00)  HR: 78 (22 Jun 2021 05:41) (73 - 78)  BP: 98/62 (22 Jun 2021 05:41) (96/58 - 98/62)  BP(mean): --  ABP: --  ABP(mean): --  RR: 20 (22 Jun 2021 05:41) (16 - 20)  SpO2: --
ICU Vital Signs Last 24 Hrs  T(C): 36.3 (24 Jun 2021 08:09), Max: 36.4 (24 Jun 2021 05:45)  T(F): 97.3 (24 Jun 2021 08:09), Max: 97.6 (24 Jun 2021 05:45)  HR: 82 (24 Jun 2021 08:09) (77 - 94)  BP: 124/78 (24 Jun 2021 08:09) (111/72 - 124/78)  BP(mean): --  ABP: --  ABP(mean): --  RR: 18 (24 Jun 2021 08:09) (16 - 18)  SpO2: --

## 2021-06-24 NOTE — DISCHARGE NOTE BEHAVIORAL HEALTH - HPI (INCLUDE ILLNESS QUALITY, SEVERITY, DURATION, TIMING, CONTEXT, MODIFYING FACTORS, ASSOCIATED SIGNS AND SYMPTOMS)
The patient is 47 yo single white female, non-caregiver, disabled, [facility] [place] since 12/2014, with history of Borderline Personality Disorder, Psychogenic Seizures, unspecified mood d/o, history of suicide attempt vs gesture - superficially cut her neck with glass/did not require sutures/no scars in 2014, PMHx of endometriosis , no history of substance abuse, history of trauma, no legal issues or access to firearms, BIB EMS after she called 911 "by mistake"; presents with manic episode.    During the evaluation patient is acutely manic, she presents with mood lability , smiling, laughing inappropriately, euphoric- dysphoric, becomes loud and irritable, she is difficult to follow, presents with racing thoughts and pressured speech, she admits that she hasn't been sleeping for 3-4 nights and has been searching "things on media" Today she had an argument with a Sikh woman on Facebook  because "she was unmasked and I leave in Sikh community" " I am pissed off, I don't know anymore whom I protect: [place] or the [place]" "all hector are matter" Then she starts talking about COVID symptoms.   She would often start to laugh during the interview. she denies hearing voices, no visions or delusions, she states that she has been taking her meds properly, but "it seems they are not helping" patient states that she feels hyper and happy. She is loud and admits that she is manic, no grandiosity, speech is fast, but coherent.

## 2021-06-24 NOTE — PROGRESS NOTE BEHAVIORAL HEALTH - MODIFICATIONS
seen/discussed with resident.  No psychosis or s/h ideation. States she feels manic but appears much calmer and at ease.  Continue meds and d/c planning
seen and discussed with resident in team meeting.  Pt is not acutely suicidal or psychotic, and says he is manic and not at baseline.  Will adjust meds as indicated and monitor. Use of medical marijuana noted
seen/discussed with resident.  No s/h ideation or psychosis. As noted pt remains preoccupied with "long haul covid", but is without any acute objective somatic sx warranting further intervention at this time.  No contra-indications to d/c tomorrow with return to home and outpatient f/u

## 2021-06-24 NOTE — DISCHARGE NOTE BEHAVIORAL HEALTH - NSBHDCSUICFCTRMIT_PSY_A_CORE
help seeking behavior, strong alliance with weekly therapist, utilizer of 911 and suicide hotline during times of crisis

## 2021-06-24 NOTE — PROGRESS NOTE BEHAVIORAL HEALTH - NSBHADMITIPREASON_PSY_A_CORE
Danger to self; mental illness expected to respond to inpatient care
SHANELL Dawkins RN

## 2021-06-24 NOTE — DISCHARGE NOTE BEHAVIORAL HEALTH - NSBHDCSWCOMMENTSFT_PSY_A_CORE
discharge fax to [facility]  and   discharge faxed to [facility]  and   on 06/25/2021 at 10:15am.

## 2021-06-24 NOTE — DISCHARGE NOTE BEHAVIORAL HEALTH - NSBHDCMEDSFT_PSY_A_CORE
Patient was re-started on home medications: Lamictal 150mg po BID, Klonopin 0.5mg po BID, Seroquel 50mg po PRN for insomnia. Her Prozac 5mg po daily was held due to concerns for mood lability, ?judson. Patient was also started on Lithium 150mg po BID for mood lability, impulsivity and anti-suicidal effects. She responded well to above medications without any adverse side effects noted.

## 2021-06-24 NOTE — DISCHARGE NOTE BEHAVIORAL HEALTH - PAST PSYCHIATRIC HISTORY
multiple prior [facility] admissions, most recent Summer 2016 for anxiety/depression, dx of mood disorder, cluster b personality traits, 1 prior hx of SA-superficial cut across throat not requiring medical attention, denies hx of NSSIB, currently in outpatient txt at [facility] Dr. Mercado, therapist Addie Salcido

## 2021-06-25 VITALS
HEART RATE: 83 BPM | SYSTOLIC BLOOD PRESSURE: 118 MMHG | TEMPERATURE: 97 F | RESPIRATION RATE: 16 BRPM | DIASTOLIC BLOOD PRESSURE: 77 MMHG

## 2021-06-25 LAB — GLUCOSE BLDC GLUCOMTR-MCNC: 99 MG/DL — SIGNIFICANT CHANGE UP (ref 70–99)

## 2021-06-25 PROCEDURE — 99238 HOSP IP/OBS DSCHRG MGMT 30/<: CPT

## 2021-06-25 RX ORDER — CLONAZEPAM 1 MG
1 TABLET ORAL
Qty: 14 | Refills: 0
Start: 2021-06-25

## 2021-06-25 RX ADMIN — Medication 25 MILLIGRAM(S): at 06:14

## 2021-06-25 RX ADMIN — LAMOTRIGINE 150 MILLIGRAM(S): 25 TABLET, ORALLY DISINTEGRATING ORAL at 08:11

## 2021-06-25 RX ADMIN — FAMOTIDINE 20 MILLIGRAM(S): 10 INJECTION INTRAVENOUS at 08:11

## 2021-06-25 RX ADMIN — Medication 0.5 MILLIGRAM(S): at 08:12

## 2021-06-25 RX ADMIN — LITHIUM CARBONATE 150 MILLIGRAM(S): 300 TABLET, EXTENDED RELEASE ORAL at 08:11

## 2021-06-25 NOTE — CHART NOTE - NSCHARTNOTEFT_GEN_A_CORE
Pt was d/c'd today in improved state.  No psychosis or s/h ideation. F/u in place, and pt understood need for continued compliance FREE:[LAST:[Addes],FIRST:[[place]antiago],PHONE:[(   )    -],FAX:[(   )    -],ADDRESS:[Primary medicine doctor]],FREE:[LAST:[[place]],FIRST:[[place]],PHONE:[(   )    -],FAX:[(   )    -]]

## 2021-06-26 ENCOUNTER — NON-APPOINTMENT (OUTPATIENT)
Age: 46
End: 2021-06-26

## 2021-06-29 ENCOUNTER — NON-APPOINTMENT (OUTPATIENT)
Age: 46
End: 2021-06-29

## 2021-06-30 PROCEDURE — 99214 OFFICE O/P EST MOD 30 MIN: CPT | Mod: 95

## 2021-07-01 DIAGNOSIS — F43.10 POST-TRAUMATIC STRESS DISORDER, UNSPECIFIED: ICD-10-CM

## 2021-07-01 DIAGNOSIS — E78.00 PURE HYPERCHOLESTEROLEMIA, UNSPECIFIED: ICD-10-CM

## 2021-07-01 DIAGNOSIS — F31.11 BIPOLAR DISORDER, CURRENT EPISODE MANIC WITHOUT PSYCHOTIC FEATURES, MILD: ICD-10-CM

## 2021-07-01 DIAGNOSIS — Z88.5 ALLERGY STATUS TO NARCOTIC AGENT: ICD-10-CM

## 2021-07-01 DIAGNOSIS — Z91.5 PERSONAL HISTORY OF SELF-HARM: ICD-10-CM

## 2021-07-01 DIAGNOSIS — G40.909 EPILEPSY, UNSPECIFIED, NOT INTRACTABLE, WITHOUT STATUS EPILEPTICUS: ICD-10-CM

## 2021-07-01 DIAGNOSIS — Z88.1 ALLERGY STATUS TO OTHER ANTIBIOTIC AGENTS STATUS: ICD-10-CM

## 2021-07-01 DIAGNOSIS — R45.851 SUICIDAL IDEATIONS: ICD-10-CM

## 2021-07-01 DIAGNOSIS — F60.3 BORDERLINE PERSONALITY DISORDER: ICD-10-CM

## 2021-08-12 PROCEDURE — 99214 OFFICE O/P EST MOD 30 MIN: CPT | Mod: 95

## 2021-11-08 PROCEDURE — 99214 OFFICE O/P EST MOD 30 MIN: CPT | Mod: 95

## 2021-12-02 NOTE — ED BEHAVIORAL HEALTH ASSESSMENT NOTE - NS ED BHA REVIEW OF ED CHART AVAILABLE LABS REVIEWED
Procedure To Be Performed At Next Visit: Excision Introduction Text (Please End With A Colon): The following procedure was deferred: Detail Level: Simple Yes

## 2021-12-23 NOTE — ED BEHAVIORAL HEALTH ASSESSMENT NOTE - NS ED BHA PLAN NEED FOR 1 TO 1 ON INPATIENT UNIT YN
[facility] ED Admit Snapshot                                           Diagnosis:  Alcoholic intoxication without complication (CMS/[facility])  (primary encounter diagnosis)  Alcohol abuse  Depression, unspecified depression type  Generalized weakness  Inability to ambulate due to multiple joints      CMS work-up in progress: N/A    Orientation: oriented to time, place, and person    Safety:  fall risk    Mobility:  Up with Assist    Last set of vitals:  Patient Vitals for the past 24 hrs:   BP Temp Temp src Pulse Resp SpO2 Height Weight   12/23/21 1600 (!) 163/77 -- -- 90 18 95 % -- --   12/23/21 1530 123/76 -- -- 99 20 96 % -- --   12/23/21 1515 (!) 158/88 -- -- 96 18 97 % -- --   12/23/21 1430 109/60 -- -- 85 15 94 % -- --   12/23/21 1400 124/68 -- -- 81 16 93 % -- --   12/23/21 1330 97/62 -- -- 83 17 95 % -- --   12/23/21 1300 103/62 -- -- 82 17 96 % -- --   12/23/21 1230 103/65 -- -- 84 14 96 % -- --   12/23/21 1200 105/59 -- -- 89 20 98 % -- --   12/23/21 1140 -- -- -- -- -- 96 % -- --   12/23/21 1131 120/73 97.3 °F (36.3 °C) Oral 95 18 96 % 5' 9\" (1.753 m) 124.9 kg (275 lb 5.7 oz)                Pain: Mild pain    Infusions: None    Special Considerations: None      Alyse Barth, GARY    Extension:  [id number]                 No

## 2022-03-19 ENCOUNTER — EMERGENCY (EMERGENCY)
Facility: HOSPITAL | Age: 47
LOS: 1 days | Discharge: HOME | End: 2022-03-19
Attending: STUDENT IN AN ORGANIZED HEALTH CARE EDUCATION/TRAINING PROGRAM | Admitting: STUDENT IN AN ORGANIZED HEALTH CARE EDUCATION/TRAINING PROGRAM
Payer: MEDICARE

## 2022-03-19 VITALS
DIASTOLIC BLOOD PRESSURE: 96 MMHG | OXYGEN SATURATION: 100 % | SYSTOLIC BLOOD PRESSURE: 124 MMHG | HEART RATE: 80 BPM | RESPIRATION RATE: 18 BRPM

## 2022-03-19 VITALS
HEART RATE: 109 BPM | DIASTOLIC BLOOD PRESSURE: 90 MMHG | TEMPERATURE: 98 F | RESPIRATION RATE: 16 BRPM | HEIGHT: 67 IN | SYSTOLIC BLOOD PRESSURE: 149 MMHG | OXYGEN SATURATION: 100 %

## 2022-03-19 LAB
ANION GAP SERPL CALC-SCNC: 10 MMOL/L — SIGNIFICANT CHANGE UP (ref 7–14)
BUN SERPL-MCNC: 11 MG/DL — SIGNIFICANT CHANGE UP (ref 7–23)
CALCIUM SERPL-MCNC: 10.7 MG/DL — HIGH (ref 8.4–10.5)
CHLORIDE SERPL-SCNC: 105 MMOL/L — SIGNIFICANT CHANGE UP (ref 98–107)
CO2 SERPL-SCNC: 24 MMOL/L — SIGNIFICANT CHANGE UP (ref 22–31)
CREAT SERPL-MCNC: 0.86 MG/DL — SIGNIFICANT CHANGE UP (ref 0.5–1.3)
EGFR: 84 ML/MIN/1.73M2 — SIGNIFICANT CHANGE UP
GLUCOSE SERPL-MCNC: 133 MG/DL — HIGH (ref 70–99)
HCT VFR BLD CALC: 42.3 % — SIGNIFICANT CHANGE UP (ref 34.5–45)
HGB BLD-MCNC: 14.1 G/DL — SIGNIFICANT CHANGE UP (ref 11.5–15.5)
MAGNESIUM SERPL-MCNC: 2 MG/DL — SIGNIFICANT CHANGE UP (ref 1.6–2.6)
MCHC RBC-ENTMCNC: 30.3 PG — SIGNIFICANT CHANGE UP (ref 27–34)
MCHC RBC-ENTMCNC: 33.3 GM/DL — SIGNIFICANT CHANGE UP (ref 32–36)
MCV RBC AUTO: 91 FL — SIGNIFICANT CHANGE UP (ref 80–100)
NRBC # BLD: 0 /100 WBCS — SIGNIFICANT CHANGE UP
NRBC # FLD: 0 K/UL — SIGNIFICANT CHANGE UP
PHOSPHATE SERPL-MCNC: 2.5 MG/DL — SIGNIFICANT CHANGE UP (ref 2.5–4.5)
PLATELET # BLD AUTO: 357 K/UL — SIGNIFICANT CHANGE UP (ref 150–400)
POTASSIUM SERPL-MCNC: 4.8 MMOL/L — SIGNIFICANT CHANGE UP (ref 3.5–5.3)
POTASSIUM SERPL-SCNC: 4.8 MMOL/L — SIGNIFICANT CHANGE UP (ref 3.5–5.3)
RBC # BLD: 4.65 M/UL — SIGNIFICANT CHANGE UP (ref 3.8–5.2)
RBC # FLD: 12.3 % — SIGNIFICANT CHANGE UP (ref 10.3–14.5)
SODIUM SERPL-SCNC: 139 MMOL/L — SIGNIFICANT CHANGE UP (ref 135–145)
TROPONIN T, HIGH SENSITIVITY RESULT: <6 NG/L — SIGNIFICANT CHANGE UP
WBC # BLD: 11.95 K/UL — HIGH (ref 3.8–10.5)
WBC # FLD AUTO: 11.95 K/UL — HIGH (ref 3.8–10.5)

## 2022-03-19 PROCEDURE — 99285 EMERGENCY DEPT VISIT HI MDM: CPT | Mod: GC

## 2022-03-19 PROCEDURE — 71045 X-RAY EXAM CHEST 1 VIEW: CPT | Mod: 26

## 2022-03-19 RX ORDER — LAMOTRIGINE 25 MG/1
150 TABLET, ORALLY DISINTEGRATING ORAL ONCE
Refills: 0 | Status: COMPLETED | OUTPATIENT
Start: 2022-03-19 | End: 2022-03-19

## 2022-03-19 RX ORDER — SODIUM CHLORIDE 9 MG/ML
1000 INJECTION INTRAMUSCULAR; INTRAVENOUS; SUBCUTANEOUS ONCE
Refills: 0 | Status: COMPLETED | OUTPATIENT
Start: 2022-03-19 | End: 2022-03-19

## 2022-03-19 RX ADMIN — LAMOTRIGINE 150 MILLIGRAM(S): 25 TABLET, ORALLY DISINTEGRATING ORAL at 20:57

## 2022-03-19 RX ADMIN — SODIUM CHLORIDE 1000 MILLILITER(S): 9 INJECTION INTRAMUSCULAR; INTRAVENOUS; SUBCUTANEOUS at 18:51

## 2022-03-19 NOTE — ED PROVIDER NOTE - NSFOLLOWUPINSTRUCTIONS_ED_ALL_ED_FT
You came to the ED because you were experiencing chest pain. Your blood work and EKG results were unremarkable. You do not have any evidence of a heart attack.     General instructions   •Pay attention to any changes in your symptoms. Tell your health care provider about them or any new symptoms.  •Avoid any activities that cause chest pain.  •Keep all follow-up visits as told by your health care provider. This is important. This includes visits for any further testing if your chest pain does not go away.    Contact a health care provider if:  •Your chest pain does not go away.  •You feel depressed.  •You have a fever.    Get help right away if:  •Your chest pain gets worse.  •You have a cough that gets worse, or you cough up blood.  •You have severe pain in your abdomen.  •You faint.  •You have sudden, unexplained chest discomfort.  •You have sudden, unexplained discomfort in your arms, back, neck, or jaw.  •You have shortness of breath at any time.  •You suddenly start to sweat, or your skin gets clammy.  •You feel nausea or you vomit.  •You suddenly feel lightheaded or dizzy.  •You have severe weakness, or unexplained weakness or fatigue.  •Your heart begins to beat quickly, or it feels like it is skipping beats.    These symptoms may represent a serious problem that is an emergency. Do not wait to see if the symptoms will go away. Get medical help right away. Call your local emergency services (911 in the [place] Do not drive yourself to the hospital. You came to the ED because you were experiencing chest pain. Your blood work and EKG results were unremarkable. You do not have any evidence of a heart attack. Please continue to follow up with closely with your neurologist (dr. Hoskins) for ongoing evaluation and management and continue to take your medications as prescribed.     General instructions   •Pay attention to any changes in your symptoms. Tell your health care provider about them or any new symptoms.  •Avoid any activities that cause chest pain.  •Keep all follow-up visits as told by your health care provider. This is important. This includes visits for any further testing if your chest pain does not go away.    Contact a health care provider if:  •Your chest pain does not go away.  •You feel depressed.  •You have a fever.    Get help right away if:  •Your chest pain gets worse.  •You have a cough that gets worse, or you cough up blood.  •You have severe pain in your abdomen.  •You faint.  •You have sudden, unexplained chest discomfort.  •You have sudden, unexplained discomfort in your arms, back, neck, or jaw.  •You have shortness of breath at any time.  •You suddenly start to sweat, or your skin gets clammy.  •You feel nausea or you vomit.  •You suddenly feel lightheaded or dizzy.  •You have severe weakness, or unexplained weakness or fatigue.  •Your heart begins to beat quickly, or it feels like it is skipping beats.    These symptoms may represent a serious problem that is an emergency. Do not wait to see if the symptoms will go away. Get medical help right away. Call your local emergency services (911 in the [place] Do not drive yourself to the hospital.

## 2022-03-19 NOTE — ED PROVIDER NOTE - PHYSICAL EXAMINATION
Gen: no acute distress, nervous and anxious, pressured speech, awake, alert and oriented x 3  Cardiac: regular rate and rhythm, +S1S2  Pulm: Clear to auscultation bilaterally  Abd: soft, nontender, nondistended, no guarding  Back: neg CVA ttp, nontender spine  Extremity: no edema, no deformity, warm and well perfused, FROM all extremities    Neuro: awake, alert, oriented x 3, sensorimotor intact  Psych: normal affect

## 2022-03-19 NOTE — ED ADULT TRIAGE NOTE - CHIEF COMPLAINT QUOTE
Pt c/o chest pain, and sob x 2 hrs.  Pt seen at [facility] with similar symptoms  and was dx with aneurysm., referred to cardiologist

## 2022-03-19 NOTE — ED PROVIDER NOTE - CLINICAL SUMMARY MEDICAL DECISION MAKING FREE TEXT BOX
46 F with extensive psychiatric history including borderline personality disorder, psychogenic seizures, mood disorder, presenting with complaints of chest pain. At time of encounter, patient is well-appearing, endorsing minimal pain, and vitals stable. Given absence of significant cardiac risk factors, low suspicion for MI or ACS. However, she may be having atypical chest pain. Suspect symptoms mostly due to underlying anxiety. EKG showing sinus tach, no acute ischemic changes. Will obtain labs including troponin for initial assessment.

## 2022-03-19 NOTE — ED PROVIDER NOTE - PROGRESS NOTE DETAILS
Fili Cruz MD PGY2: EKG sinus tachy. Troponin negative. Noted to be slightly hypercalcemic. Will give 1L IVF. Check CXR to complete chest pain workup. Likely discharge.

## 2022-03-19 NOTE — ED PROVIDER NOTE - PATIENT PORTAL LINK FT
You can access the FollowMyHealth Patient Portal offered by [facility] by registering at the following website: [email or web address]. By joining [employer]’s FollowMyHealth portal, you will also be able to view your health information using other applications (apps) compatible with our system.

## 2022-03-19 NOTE — ED PROVIDER NOTE - ATTENDING CONTRIBUTION TO CARE
45 yo female with PMH borderline personality disorder, psychogenic seizures, mood disorder, previous hospitalization for suicidal ideation, for evaluation of chest pain earlier today. State she recently found out she had a aneurysm and is concerned if it will explode. Also reports that she thinks she is having nocturnal focal seizures, not able to convey what symptoms she is specifically experiencing. States she had an evaluation with her neurologist 2 days ago and was scheduled for an upcoming EEG evaluation outpatient. Denies any suicidal or homicidal ideations. Denies AV hallucinations. Requesting klonopin because she thinks she may get anxious. PE: pressured speech but able to converse with linear conversation, RRR, CTA BL Lungs, has episodes of eyes closed and jerking movement which she is aware of and awake for, neuro intact A/P Labs, imaging, medicate, reassess

## 2022-03-19 NOTE — ED PROVIDER NOTE - TOBACCO USE
Patient calls our office stating that she believes she is way overdue for a thyroid US and follow-up with Dr. Fowler. Patient also states that she has lost about 20 pounds in the past 4-5 weeks and has very little energy and wonders if it could be due to her thyroid. Per Dr. Fowler, thyroid US and TSH lab orders are placed and follow-up is scheduled on 9-25-19 to go over results. Patient is also encouraged to contact her PCP and inform them of the weight loss and see if there are any additional tests they want ordered. Patient verbalizes understanding and will contact Dr. Looney. This message will also be routed to her office.   Never smoker

## 2022-03-19 NOTE — ED ADULT NURSE NOTE - OBJECTIVE STATEMENT
pt received in rm 19 AAO x 3. pt reports left sided chest pain and SOB for a couple hours this morning. pt states she was seen a [facility] this week for similar symptoms and diagnosed with an aneurysm. pt also states she felt like she was going to pass out every time should would get up from a lying position. pt denies sob and chest pain at this time. pt denies n/v/d, fevers, chills, cough. respirations even and unlabored. 20g iv placed to left ac. NSR on monitor. will continue to monitor.

## 2022-03-19 NOTE — ED PROVIDER NOTE - OBJECTIVE STATEMENT
46 F with hx of borderline personality disorder, psychogenic seizures, mood disorder, previous hospitalization for suicidal ideation, presenting to ED with complaints of chest pain. Issues has been ongoing for a few weeks. Pt experiences intermittent chest pain, sharp, in left chest. Resolves on its own. Associated with shortness of breath and exertion. Sometimes occurs at rest. Per patient, she recently had an echocardiogram at [facility] 46 F with hx of borderline personality disorder, psychogenic seizures, mood disorder, previous hospitalization for suicidal ideation, presenting to ED with complaints of chest pain. Issues has been ongoing for a few weeks. Pt experiences intermittent chest pain, sharp, in left chest. Resolves on its own. Associated with shortness of breath and exertion. Sometimes occurs at rest. Per patient, she recently had an echocardiogram at [facility] which showed congenital interatrial septum aneurysm. Rest of study was WNL. At time of encounter, patient's chest pain has improved. Patient speech noted to be pressured and would at times be tangential.

## 2022-03-19 NOTE — ED ADULT NURSE NOTE - CHIEF COMPLAINT QUOTE
Pt c/o chest pain, and sob x 2 hrs.  Pt seen at [facility] with similar symptoms  and was dx with aneurysm., referred to cardiologist

## 2022-03-19 NOTE — ED PROVIDER NOTE - NS ED ROS FT
Constitutional: no fevers or chills  Cardiac: no palpitations, + chest pain  Lungs: no shortness of breath, wheezes  Abd: no abd pain, nausea, vomiting, diarrhea  Genitourinary: no dysuria, increased urinary frequency, hematuria  Neurology: no sensorimotor deficits, no dizziness, no headache, no visual changes  Skin: no rashes  All other ROS negative except as per HPI

## 2022-04-02 ENCOUNTER — EMERGENCY (EMERGENCY)
Facility: HOSPITAL | Age: 47
LOS: 1 days | Discharge: HOME | End: 2022-04-02
Admitting: EMERGENCY MEDICINE
Payer: MEDICARE

## 2022-04-02 PROCEDURE — 99283 EMERGENCY DEPT VISIT LOW MDM: CPT

## 2022-04-04 ENCOUNTER — INPATIENT (INPATIENT)
Facility: HOSPITAL | Age: 47
LOS: 1 days | Discharge: HOME | End: 2022-04-06
Attending: PSYCHIATRY & NEUROLOGY | Admitting: PSYCHIATRY & NEUROLOGY
Payer: MEDICARE

## 2022-04-04 VITALS
RESPIRATION RATE: 18 BRPM | HEIGHT: 67 IN | DIASTOLIC BLOOD PRESSURE: 100 MMHG | OXYGEN SATURATION: 99 % | HEART RATE: 106 BPM | TEMPERATURE: 98 F | SYSTOLIC BLOOD PRESSURE: 160 MMHG

## 2022-04-04 DIAGNOSIS — F43.10 POST-TRAUMATIC STRESS DISORDER, UNSPECIFIED: ICD-10-CM

## 2022-04-04 DIAGNOSIS — F31.9 BIPOLAR DISORDER, UNSPECIFIED: ICD-10-CM

## 2022-04-04 DIAGNOSIS — F60.3 BORDERLINE PERSONALITY DISORDER: ICD-10-CM

## 2022-04-04 LAB
ALBUMIN SERPL ELPH-MCNC: 4.6 G/DL — SIGNIFICANT CHANGE UP (ref 3.3–5)
ALP SERPL-CCNC: 111 U/L — SIGNIFICANT CHANGE UP (ref 40–120)
ALT FLD-CCNC: 9 U/L — SIGNIFICANT CHANGE UP (ref 4–33)
ANION GAP SERPL CALC-SCNC: 13 MMOL/L — SIGNIFICANT CHANGE UP (ref 7–14)
APAP SERPL-MCNC: <10 UG/ML — LOW (ref 15–25)
AST SERPL-CCNC: 18 U/L — SIGNIFICANT CHANGE UP (ref 4–32)
BASOPHILS # BLD AUTO: 0.03 K/UL — SIGNIFICANT CHANGE UP (ref 0–0.2)
BASOPHILS NFR BLD AUTO: 0.3 % — SIGNIFICANT CHANGE UP (ref 0–2)
BILIRUB SERPL-MCNC: 0.3 MG/DL — SIGNIFICANT CHANGE UP (ref 0.2–1.2)
BUN SERPL-MCNC: 7 MG/DL — SIGNIFICANT CHANGE UP (ref 7–23)
CALCIUM SERPL-MCNC: 10.6 MG/DL — HIGH (ref 8.4–10.5)
CHLORIDE SERPL-SCNC: 103 MMOL/L — SIGNIFICANT CHANGE UP (ref 98–107)
CO2 SERPL-SCNC: 23 MMOL/L — SIGNIFICANT CHANGE UP (ref 22–31)
COVID-19 SPIKE DOMAIN AB INTERP: POSITIVE
COVID-19 SPIKE DOMAIN ANTIBODY RESULT: >250 U/ML — HIGH
CREAT SERPL-MCNC: 0.88 MG/DL — SIGNIFICANT CHANGE UP (ref 0.5–1.3)
EGFR: 82 ML/MIN/1.73M2 — SIGNIFICANT CHANGE UP
EOSINOPHIL # BLD AUTO: 0.17 K/UL — SIGNIFICANT CHANGE UP (ref 0–0.5)
EOSINOPHIL NFR BLD AUTO: 1.7 % — SIGNIFICANT CHANGE UP (ref 0–6)
ETHANOL SERPL-MCNC: <10 MG/DL — SIGNIFICANT CHANGE UP
FLUAV AG NPH QL: SIGNIFICANT CHANGE UP
FLUBV AG NPH QL: SIGNIFICANT CHANGE UP
GLUCOSE SERPL-MCNC: 126 MG/DL — HIGH (ref 70–99)
HCG SERPL-ACNC: <5 MIU/ML — SIGNIFICANT CHANGE UP
HCT VFR BLD CALC: 45.1 % — HIGH (ref 34.5–45)
HGB BLD-MCNC: 14.9 G/DL — SIGNIFICANT CHANGE UP (ref 11.5–15.5)
IANC: 6.3 K/UL — SIGNIFICANT CHANGE UP (ref 1.8–7.4)
IMM GRANULOCYTES NFR BLD AUTO: 0.5 % — SIGNIFICANT CHANGE UP (ref 0–1.5)
LYMPHOCYTES # BLD AUTO: 2.68 K/UL — SIGNIFICANT CHANGE UP (ref 1–3.3)
LYMPHOCYTES # BLD AUTO: 26.4 % — SIGNIFICANT CHANGE UP (ref 13–44)
MCHC RBC-ENTMCNC: 30.3 PG — SIGNIFICANT CHANGE UP (ref 27–34)
MCHC RBC-ENTMCNC: 33 GM/DL — SIGNIFICANT CHANGE UP (ref 32–36)
MCV RBC AUTO: 91.7 FL — SIGNIFICANT CHANGE UP (ref 80–100)
MONOCYTES # BLD AUTO: 0.94 K/UL — HIGH (ref 0–0.9)
MONOCYTES NFR BLD AUTO: 9.2 % — SIGNIFICANT CHANGE UP (ref 2–14)
NEUTROPHILS # BLD AUTO: 6.3 K/UL — SIGNIFICANT CHANGE UP (ref 1.8–7.4)
NEUTROPHILS NFR BLD AUTO: 61.9 % — SIGNIFICANT CHANGE UP (ref 43–77)
NRBC # BLD: 0 /100 WBCS — SIGNIFICANT CHANGE UP
NRBC # FLD: 0 K/UL — SIGNIFICANT CHANGE UP
PLATELET # BLD AUTO: 331 K/UL — SIGNIFICANT CHANGE UP (ref 150–400)
POTASSIUM SERPL-MCNC: 4.7 MMOL/L — SIGNIFICANT CHANGE UP (ref 3.5–5.3)
POTASSIUM SERPL-SCNC: 4.7 MMOL/L — SIGNIFICANT CHANGE UP (ref 3.5–5.3)
PROT SERPL-MCNC: 7.3 G/DL — SIGNIFICANT CHANGE UP (ref 6–8.3)
RBC # BLD: 4.92 M/UL — SIGNIFICANT CHANGE UP (ref 3.8–5.2)
RBC # FLD: 12.4 % — SIGNIFICANT CHANGE UP (ref 10.3–14.5)
RSV RNA NPH QL NAA+NON-PROBE: SIGNIFICANT CHANGE UP
SALICYLATES SERPL-MCNC: <0.3 MG/DL — LOW (ref 15–30)
SARS-COV-2 IGG+IGM SERPL QL IA: >250 U/ML — HIGH
SARS-COV-2 IGG+IGM SERPL QL IA: POSITIVE
SARS-COV-2 RNA SPEC QL NAA+PROBE: SIGNIFICANT CHANGE UP
SODIUM SERPL-SCNC: 139 MMOL/L — SIGNIFICANT CHANGE UP (ref 135–145)
TOXICOLOGY SCREEN, DRUGS OF ABUSE, SERUM RESULT: SIGNIFICANT CHANGE UP
TSH SERPL-MCNC: 0.62 UIU/ML — SIGNIFICANT CHANGE UP (ref 0.27–4.2)
WBC # BLD: 10.17 K/UL — SIGNIFICANT CHANGE UP (ref 3.8–10.5)
WBC # FLD AUTO: 10.17 K/UL — SIGNIFICANT CHANGE UP (ref 3.8–10.5)

## 2022-04-04 PROCEDURE — 99285 EMERGENCY DEPT VISIT HI MDM: CPT

## 2022-04-04 PROCEDURE — 99284 EMERGENCY DEPT VISIT MOD MDM: CPT | Mod: 25

## 2022-04-04 PROCEDURE — 93010 ELECTROCARDIOGRAM REPORT: CPT

## 2022-04-04 RX ORDER — PANTOPRAZOLE SODIUM 20 MG/1
40 TABLET, DELAYED RELEASE ORAL
Refills: 0 | Status: DISCONTINUED | OUTPATIENT
Start: 2022-04-04 | End: 2022-04-06

## 2022-04-04 RX ORDER — QUETIAPINE FUMARATE 200 MG/1
50 TABLET, FILM COATED ORAL AT BEDTIME
Refills: 0 | Status: DISCONTINUED | OUTPATIENT
Start: 2022-04-04 | End: 2022-04-06

## 2022-04-04 RX ORDER — HYDROXYZINE HCL 10 MG
25 TABLET ORAL ONCE
Refills: 0 | Status: COMPLETED | OUTPATIENT
Start: 2022-04-04 | End: 2022-04-04

## 2022-04-04 RX ORDER — LAMOTRIGINE 25 MG/1
150 TABLET, ORALLY DISINTEGRATING ORAL
Refills: 0 | Status: DISCONTINUED | OUTPATIENT
Start: 2022-04-04 | End: 2022-04-06

## 2022-04-04 RX ORDER — IBUPROFEN 200 MG
600 TABLET ORAL EVERY 6 HOURS
Refills: 0 | Status: DISCONTINUED | OUTPATIENT
Start: 2022-04-04 | End: 2022-04-05

## 2022-04-04 RX ORDER — ACETAMINOPHEN 500 MG
650 TABLET ORAL ONCE
Refills: 0 | Status: COMPLETED | OUTPATIENT
Start: 2022-04-04 | End: 2022-04-04

## 2022-04-04 RX ORDER — ATORVASTATIN CALCIUM 80 MG/1
20 TABLET, FILM COATED ORAL AT BEDTIME
Refills: 0 | Status: DISCONTINUED | OUTPATIENT
Start: 2022-04-04 | End: 2022-04-06

## 2022-04-04 RX ADMIN — Medication 600 MILLIGRAM(S): at 20:12

## 2022-04-04 RX ADMIN — Medication 650 MILLIGRAM(S): at 22:41

## 2022-04-04 RX ADMIN — LAMOTRIGINE 150 MILLIGRAM(S): 25 TABLET, ORALLY DISINTEGRATING ORAL at 20:12

## 2022-04-04 RX ADMIN — ATORVASTATIN CALCIUM 20 MILLIGRAM(S): 80 TABLET, FILM COATED ORAL at 20:12

## 2022-04-04 RX ADMIN — Medication 1 MILLIGRAM(S): at 15:28

## 2022-04-04 RX ADMIN — Medication 600 MILLIGRAM(S): at 21:27

## 2022-04-04 RX ADMIN — Medication 2 MILLIGRAM(S): at 20:14

## 2022-04-04 NOTE — ED BEHAVIORAL HEALTH NOTE - BEHAVIORAL HEALTH NOTE
COVID Exposure Screen- Patient  1.	*Have you had a COVID-19 test in the last 90 days?  (  ) Yes   ( X ) No   (  ) Unknown- Reason: _____  IF YES PROCEED TO QUESTION #2. IF NO OR UNKNOWN, PLEASE SKIP TO QUESTION #3.  2.	Date of test(s) and result(s): ________  3.	*Have you tested positive for COVID-19 antibodies? (  ) Yes   ( X ) No   (  ) Unknown- Reason: _____  IF YES PROCEED TO QUESTION #4. IF NO or UNKNOWN, PLEASE SKIP TO QUESTION #5.  4.	Date of positive antibody test: ________  5.	*Have you received 2 doses of the COVID-19 vaccine? ( X ) Yes   (  ) No   (  ) Unknown- Reason: _____   IF YES PROCEED TO QUESTION #6. IF NO or UNKNOWN, PLEASE SKIP TO QUESTION #7.  6.	Date of second dose: ________ completed series last 5/2021; + got booster dose   7.	*In the past 10 days, have you been around anyone with a positive COVID-19 test?* (  ) Yes   ( X ) No   (  ) Unknown- Reason: ____  IF YES PROCEED TO QUESTION #8. IF NO or UNKNOWN, PLEASE SKIP TO QUESTION #13.  8.	Were you within 6 feet of them for at least 15 minutes? (  ) Yes   (  ) No   (  ) Unknown- Reason: _____  9.	Have you provided care for them? (  ) Yes   (  ) No   (  ) Unknown- Reason: ______  10.	Have you had direct physical contact with them (touched, hugged, or kissed them)? (  ) Yes   (  ) No    (  ) Unknown- Reason: _____  11.	Have you shared eating or drinking utensils with them? (  ) Yes   (  ) No    (  ) Unknown- Reason: ____  12.	Have they sneezed, coughed, or somehow gotten respiratory droplets on you? (  ) Yes   (  ) No    (  ) Unknown- Reason: ______  13.	*Have you been out of [place] within the past 10 days?* (  ) Yes   ( X ) No   (  ) Unknown- Reason: _____  IF YES PLEASE ANSWER THE FOLLOWING QUESTIONS:  14.	Which state/country have you been to? ______  15.	Were you there over 24 hours? (  ) Yes   (  ) No    (  ) Unknown- Reason: ______  16.	Date of return to [facility]: ______

## 2022-04-04 NOTE — ED BEHAVIORAL HEALTH ASSESSMENT NOTE - ADDITIONAL DETAILS ALL
documented past hx of Attempt vs gesture via cutting neck superficially using a  glass in 2014. denied any other suicidal attempts nor engaged in self injurious behaviors

## 2022-04-04 NOTE — ED BEHAVIORAL HEALTH ASSESSMENT NOTE - DESCRIPTION
Since his  ED arrival, the Pt has been calm and cooperative.  There has been no agitation/aggressive behavior.  No verbalization of active/ passive SI/HI.   There are no signs/symptoms of acute judson or florid psychosis.  The Pt is not experiencing any AH/VH.  Pt is not showing any signs/symptoms of intoxication or withdrawal.  He has not tested limits.. Has maintained appropriate boundaries. Pt has been easily redirected.  Overall, there has been no management issues. single, domiciled. currently has a BF. unemployed. 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 has a pet cat named Anival. no reported access to guns. Nonepileptic seizures, GERD and HLD. current meds: Protonix 40mg daily and Lipitor 20mg HS

## 2022-04-04 NOTE — ED PROVIDER NOTE - CLINICAL SUMMARY MEDICAL DECISION MAKING FREE TEXT BOX
psych consult requested- recommendation inpatient tx   labs- psych consult requested- recommendation voluntary inpatient tx   labs- unremarkable

## 2022-04-04 NOTE — ED BEHAVIORAL HEALTH ASSESSMENT NOTE - HPI (INCLUDE ILLNESS QUALITY, SEVERITY, DURATION, TIMING, CONTEXT, MODIFYING FACTORS, ASSOCIATED SIGNS AND SYMPTOMS)
46 yr old female, single, domiciled and unemployed.  with paste hx of bipolar disorder, PTSD, borderline personality disorder and Psychogenic Seizures.  with past psych admissions including last hospitalization to [facility] back in 6/2021 due to "acute judson".. currently, not in psych care. Used to follow up with CORRIE [facility]ÍREZ. claimed she has been out of treatment for the last 6 months.  Has hx of suicide attempt vs gesture - superficially cut her neck with glass (which did not require suturing) in 2014. denied any illicit substance use including alcohol.  Pertinent medical issues: endometriosis, chronic pains and ? seizure disorder.  This afternoon, presented to the ED BIB EMS after she called 911 due to feeling increasingly overwhelmed and anxious re: recent events in the [place] (which triggered her PTSD symptoms).      she is seen bedside. reported that for the past few days, has been sad and anxious; feeling increasingly overwhelmed over the [place] claims that for the last 3 weeks, she has volunteered with a [employer] organization. her role, she claimed, is of a disseminator of information of what is going [place] with [place]. said organization is using various social media platform like [employer], face book, etc. during her time volunteering, she claimed coming across graphic images of the war. described as seeing "dead bodies, misery, burnt houses, people crying, etc.". has been feeling sorry for the plight of the people of [facility]. she reports being triggered by these graphic images - her own "traumatic event during childhood".. which she refused to further elaborate. "I don't feel like talking about her", she says in reference to the trauma experienced during her childhood.     as she had been feeling upset, overwhelmed by these recent experiences (as described above), she began to get increasingly anxious.. no SI but short of having passive SI - as she admitted "not feeling safe at home" this morning.. worried that "she might do something impulsively". she reports having a "huge knife" at the house. hence, decided that she needed help - and subsequently called 911.  on top of this, reported that she has been out of treatment for the past 6 months. used to be under the care of Dr Lemus but claimed that she did not find him helpful towards managing her symptoms. does not want to be prescribed antidepressant medications nor any benzodiazepines for concern that it might trigger her manic episode. claimed she last felt manic back in 6/2021. described symptoms as experiencing racing thoughts along with elevated mood and sleep disturbances. since then, there has been no recurrence of said manic episode. rather for the past few months, has been having bouts of feeling depressed and anxious. denied experiencing any specific anxiety disorder symptoms. She is not feeling paranoid. NO perceptual disturbances experienced.  denied abusing any drugs including alcohol.     see [facility] notes for collateral information obtained

## 2022-04-04 NOTE — BH PATIENT PROFILE - STATED REASON FOR ADMISSION
This is a 46 yr F, pmh bipolar, seizures with c/o sever anxiety, & depression unable to function well. Reports she is volunteering for a organization and helping the [place]. She concerns being triggered by  the war. She reports that she having "PTSD" and refusing to elaborate, unable to think clearly and almost walked into the traffic,not feeling safe @ home. She cut her neck with glass superficially back in 2014.PMHX of non-epileptic seizures,GERD & HLD. Pt is c-operative with admission procedures,sharp search& skin check done. no contraband found. Oriented to unit & staff. Pt denies S/H/I/I/P.,& denies A/VH.Continue to monitor& assessment ongoing.

## 2022-04-04 NOTE — ED BEHAVIORAL HEALTH ASSESSMENT NOTE - SUMMARY
46/F with hx of bipolar disorder, PTSD, borderline personality disorder and Psychogenic Seizures; has multiple past psych admissions; currently is not in psych treatment. has past hx of suicide attempt vs gesture - superficially cut her neck with glass (which did not require suturing) in 2014. denied any illicit substance use including alcohol.  Today, self presented to the ED BIB EMS after she called 911 due to feeling increasingly overwhelmed and anxious re: recent events in the [place] (which triggered her PTSD symptoms).      at this time, endorses feeling depressed and anxious over recent events in the [place]. claims that she has "recently discovered [place] and [place] heritage" and feels connected with the [place] people. been feeling overwhelmed by the death and destruction in [place] much so that, she claimed this has helped triggered her PTSD symptoms. Is unable to partake towards safety planning. has been out of psych treatment for the past 6 months. is help seeking and requests in-Pt psych admissions as she cites: feeling unsafe being alone at home/ unpredictability - "might do something impulsive with a huge knife at home".     at this time, does not appear to be acutely manic nor psychotic. she is not actively suicidal or homicidal. Pt is not delirious. she wants to pursue voluntary psych admission.      RECOMMENDATIONS:   1. CONTINUE WITH HER: Lamictal 150mg BID. primary treatment team to titrate. team's discretion whether to entertain adding an antidepressant or not  2. PRN: Seroquel 50mg HS as off label for sleep disturbance  3. Klonopin 0.5mg daily for control of anxiety  4. PRN: ativan 2mg PO/IM q6Hrs for breakthrough anxiety/ severe agitation  5. continue with her: Protonix 40mg AM and Lipitor 20mg HS  6. once medically cleared, facilitate transfer to [place] [id number] on 9.13 status

## 2022-04-04 NOTE — ED BEHAVIORAL HEALTH ASSESSMENT NOTE - DETAILS
oxycodone, sudafed per transfer protocol discussed final dispo with [facility]. BF informed of this admission documented past hx of Attempt vs gesture via cutting neck superficially using a  glass in 2014 per transfer protocol. left detailed VM for Dr HEAVENLY Quijano at ext. [phone]

## 2022-04-04 NOTE — ED BEHAVIORAL HEALTH ASSESSMENT NOTE - UNABLE TO CARE FOR SELF DETAILS
given ongoing depression and anxiety, functionality + ADLs compromised; feels "unsafe at home as she has a huge knife".. unsure if she will be able to stave away "urge to resort towards inflicting harm to self" given past hx of SA vs gesture back in 2014

## 2022-04-04 NOTE — ED BEHAVIORAL HEALTH ASSESSMENT NOTE - RISK ASSESSMENT
Protective factors include no violence history, poor compliance to medication, no recent SAs, no current objective findings for acute suicidality; supportive housing, no access to guns, no substance abuse, willingness to seek help, no homicidal ideation, help seeking  Chronic elevated background risk given risk factors include history of suicidal thoughts in the past/suicidal gestures, multiple hospitalizations,  limited social supports and BPD. at this time, is at Low imminent risk of self harm.  however, cannot be safely discharged back to the community - she is unable to partake towards safety planning. moreso, is requesting psych admission Low Acute Suicide Risk

## 2022-04-04 NOTE — ED BEHAVIORAL HEALTH ASSESSMENT NOTE - NSSUICPROTFACT_PSY_ALL_CORE
Identifies reasons for living/Supportive social network of family or friends/Positive therapeutic relationships/Beloved pets

## 2022-04-04 NOTE — BH PATIENT PROFILE - ALLERGIES
Allergies:-  clindamycin  opioid-like analgesics  clindamycin  oxycodone  Sudafed  codeine  oxycodone  Sudafed

## 2022-04-04 NOTE — ED BEHAVIORAL HEALTH NOTE - BEHAVIORAL HEALTH NOTE
Writer met with patient who provided Jesus [phone] her boyfriend for collateral contact.  He states she's been having episodes of seizures recently on a daily basis.  He states he's been there for one or two of them.  He states she needs to be admitted to the hospital to get that taken care of.  He states he's busy right now and can't talk to provide any additional clinical.  Pt also states she sees a neurologist Dr. Jose Mota for her, "conversion thing".  She states she doesn't like Dr. Mota, but requests that writer call his NP in the office Serena Morfin.   called Dr. Mota's office  [phone] to request ALVARADO morfin.   left a message requesting a callback to the emergency room.

## 2022-04-04 NOTE — BH PATIENT PROFILE - HOME MEDICATIONS
clonazePAM 0.5 mg oral tablet , 1 tab(s) orally 2 times a day, until otherwise instructed by MD MDD:2 tabs  lamoTRIgine 150 mg oral tablet , 1 tab(s) orally 2 times a day, until otherwise instructed by MD  famotidine 20 mg oral tablet , 1 tab(s) orally 2 times a day, until otherwise instructed by MD

## 2022-04-04 NOTE — ED ADULT TRIAGE NOTE - CHIEF COMPLAINT QUOTE
p/t with hx PTSD , anxiety, c/o of increased anxiety since this am, p/t appears very anxious, denies any Si or HI

## 2022-04-04 NOTE — ED BEHAVIORAL HEALTH ASSESSMENT NOTE - CURRENT MEDICATION
claims she is currently on Lamictal 150mg BID   as per [facility] ; previously prescribed on 05/11/2021 and filled last 03/28/2022 with forte lozenge 5mg thc: <0.1mg cbd/lozenge x 2 for 6 days	Sultana Sam (DO)	[id number]	[place]	[employer]

## 2022-04-04 NOTE — BH PATIENT PROFILE - HAS THE PATIENT BEEN ADMITTED FROM SHORT TERM REHAB?
----- Message from Berenice Castro [phone].    Thanks,   Berenice Castro     no

## 2022-04-04 NOTE — ED BEHAVIORAL HEALTH ASSESSMENT NOTE - NSPRESENTSXS_PSY_ALL_CORE
Hopelessness or despair/Global insomnia/Severe anxiety, agitation or panic/Refusal or inability to complete safety plan

## 2022-04-04 NOTE — ED BEHAVIORAL HEALTH ASSESSMENT NOTE - NSBHROSSYSTEMS_PSY_ALL_CORE
currently, Pt denied any headaches, no dizziness, no blurring of vision; no sorethroat; no cough, no fever. no chest pains, no SOB, no palpitations, no abdominal pains, no nausea/ vomiting/ diarrhea, no dysuria, no hesitancy, no arthralgia/ no pruritus. denied any muscle/ joint pains

## 2022-04-04 NOTE — ED PROVIDER NOTE - OBJECTIVE STATEMENT
This is a 46 yr F, pmh bipolar, seizures with c/o sever anxiety, unable to function and disassociation. Reports she is volunteering for a organization and helping the [place]. She concerns being triggered by  the war. She reports unable to think clearly and almost walked into the traffic.

## 2022-04-04 NOTE — ED BEHAVIORAL HEALTH ASSESSMENT NOTE - PAST PSYCHOTROPIC MEDICATION
Trazodone, Hydroxyzine, Effexor XR, Prozac, Lithium, Depakote, zoloft, Risperdal, Seroquel 25 - 50mg HS PRN

## 2022-04-04 NOTE — ED BEHAVIORAL HEALTH ASSESSMENT NOTE - OTHER PAST PSYCHIATRIC HISTORY (INCLUDE DETAILS REGARDING ONSET, COURSE OF ILLNESS, INPATIENT/OUTPATIENT TREATMENT)
with multiple past psych admissions including [facility] last in 3/2019; SI back in 2021  with past hx of Borderline Personality Disorder, Bipolar Disorder and post-traumatic stress disorder, hx of suicide attempt vs gesture (as per the chart- superficially cut her neck with glass, did not require sutures.) back in 2014  currently, not in psych care  last seen by Dr Lemus back in 11/8/2021; used to see therapist, Addie Salcido - last attended session back in 7/26/2021  She is currently under care of Dr Amaro at adult ambulatory care clinic,

## 2022-04-04 NOTE — ED BEHAVIORAL HEALTH ASSESSMENT NOTE - PSYCHIATRIC ISSUES AND PLAN (INCLUDE STANDING AND PRN MEDICATION)
continue with Lamictal 150mg BID.  Primary treatment team to adjust accordingly; +/- add antidepressant. PRN: Seroquel 50mg HS as off label for sleep disturbance.  PRN: ativan 2mg PO/IM q6Hrs for agitation/ SEVERE AGITATION

## 2022-04-04 NOTE — ED BEHAVIORAL HEALTH ASSESSMENT NOTE - OTHER
[place] [facility]  Reference #: [id number] - prescribed last 03/18/2022 and filled on 03/23/2022 with clonazepam 0.5 mg x 20 tablets for 10 days by Serena Morfin	[id number]	Medicare	[id number] appeared as stated age, in casual appropriate clothing. not disheveled. not internally preoccupied ongoing conflict in [place]/ feeling overwhelmed with her volunteerism

## 2022-04-04 NOTE — ED ADULT NURSE NOTE - OBJECTIVE STATEMENT
pt is A&Ox3, ambulatory, able to make needs known and presents for eval due to increased disassociated thoughts and worsening anxiety which PT relates to the [place] war. PT denies any medical complaints and is compliant with care. PT requests admission as she doesn't feel safe. No SI or SA reported

## 2022-04-04 NOTE — ED ADULT NURSE NOTE - NSIMPLEMENTINTERV_GEN_ALL_ED
Implemented All Universal Safety Interventions:  [place] to call system. Call bell, personal items and telephone within reach. Instruct patient to call for assistance. Room bathroom lighting operational. Non-slip footwear when patient is off stretcher. Physically safe environment: no spills, clutter or unnecessary equipment. Stretcher in lowest position, wheels locked, appropriate side rails in place.

## 2022-04-04 NOTE — ED PROVIDER NOTE - CARE PLAN
1 Principal Discharge DX:	Bipolar disorder  Secondary Diagnosis:	Borderline personality disorder  Secondary Diagnosis:	Post traumatic stress disorder (PTSD)

## 2022-04-04 NOTE — BH PATIENT PROFILE - FALL HARM RISK - UNIVERSAL INTERVENTIONS
Bed in lowest position, wheels locked, appropriate side rails in place/Call bell, personal items and telephone in reach/Instruct patient to call for assistance before getting out of bed or chair/Non-slip footwear when patient is out of bed/[place] to call system/Physically safe environment - no spills, clutter or unnecessary equipment/Purposeful Proactive Rounding/Room/bathroom lighting operational, light cord in reach

## 2022-04-04 NOTE — ED BEHAVIORAL HEALTH ASSESSMENT NOTE - NSACTIVEVENT_PSY_ALL_CORE
current Citizen of [place] conflict "triggering her PTSD symptoms"/Triggering events leading to humiliation, shame, and/or despair (e.g., Loss of relationship, financial or health status) (real or anticipated)

## 2022-04-05 DIAGNOSIS — M54.50 LOW BACK PAIN, UNSPECIFIED: ICD-10-CM

## 2022-04-05 DIAGNOSIS — M54.2 CERVICALGIA: ICD-10-CM

## 2022-04-05 DIAGNOSIS — G40.909 EPILEPSY, UNSPECIFIED, NOT INTRACTABLE, WITHOUT STATUS EPILEPTICUS: ICD-10-CM

## 2022-04-05 LAB
AMPHET UR-MCNC: NEGATIVE — SIGNIFICANT CHANGE UP
APPEARANCE UR: CLEAR — SIGNIFICANT CHANGE UP
BARBITURATES UR SCN-MCNC: NEGATIVE — SIGNIFICANT CHANGE UP
BENZODIAZ UR-MCNC: NEGATIVE — SIGNIFICANT CHANGE UP
BILIRUB UR-MCNC: NEGATIVE — SIGNIFICANT CHANGE UP
COCAINE METAB.OTHER UR-MCNC: NEGATIVE — SIGNIFICANT CHANGE UP
COLOR SPEC: SIGNIFICANT CHANGE UP
CREATININE URINE RESULT, DAU: 88 MG/DL — SIGNIFICANT CHANGE UP
DIFF PNL FLD: NEGATIVE — SIGNIFICANT CHANGE UP
GLUCOSE UR QL: NEGATIVE — SIGNIFICANT CHANGE UP
KETONES UR-MCNC: NEGATIVE — SIGNIFICANT CHANGE UP
LEUKOCYTE ESTERASE UR-ACNC: NEGATIVE — SIGNIFICANT CHANGE UP
METHADONE UR-MCNC: NEGATIVE — SIGNIFICANT CHANGE UP
NITRITE UR-MCNC: NEGATIVE — SIGNIFICANT CHANGE UP
OPIATES UR-MCNC: NEGATIVE — SIGNIFICANT CHANGE UP
OXYCODONE UR-MCNC: NEGATIVE — SIGNIFICANT CHANGE UP
PCP SPEC-MCNC: SIGNIFICANT CHANGE UP
PCP UR-MCNC: NEGATIVE — SIGNIFICANT CHANGE UP
PH UR: 7 — SIGNIFICANT CHANGE UP (ref 5–8)
PROT UR-MCNC: NEGATIVE — SIGNIFICANT CHANGE UP
SP GR SPEC: 1.01 — SIGNIFICANT CHANGE UP (ref 1–1.05)
THC UR QL: POSITIVE
UROBILINOGEN FLD QL: SIGNIFICANT CHANGE UP

## 2022-04-05 PROCEDURE — 90853 GROUP PSYCHOTHERAPY: CPT

## 2022-04-05 RX ORDER — ACETAMINOPHEN 500 MG
650 TABLET ORAL EVERY 6 HOURS
Refills: 0 | Status: DISCONTINUED | OUTPATIENT
Start: 2022-04-05 | End: 2022-04-06

## 2022-04-05 RX ORDER — IBUPROFEN 200 MG
800 TABLET ORAL EVERY 6 HOURS
Refills: 0 | Status: DISCONTINUED | OUTPATIENT
Start: 2022-04-05 | End: 2022-04-05

## 2022-04-05 RX ORDER — LIDOCAINE 4 G/100G
1 CREAM TOPICAL DAILY
Refills: 0 | Status: DISCONTINUED | OUTPATIENT
Start: 2022-04-05 | End: 2022-04-06

## 2022-04-05 RX ORDER — PRAZOSIN HCL 2 MG
1 CAPSULE ORAL AT BEDTIME
Refills: 0 | Status: DISCONTINUED | OUTPATIENT
Start: 2022-04-05 | End: 2022-04-06

## 2022-04-05 RX ORDER — CLONAZEPAM 1 MG
0.25 TABLET ORAL EVERY 6 HOURS
Refills: 0 | Status: DISCONTINUED | OUTPATIENT
Start: 2022-04-05 | End: 2022-04-06

## 2022-04-05 RX ORDER — IBUPROFEN 200 MG
800 TABLET ORAL EVERY 12 HOURS
Refills: 0 | Status: DISCONTINUED | OUTPATIENT
Start: 2022-04-05 | End: 2022-04-06

## 2022-04-05 RX ADMIN — Medication 1 MILLIGRAM(S): at 21:26

## 2022-04-05 RX ADMIN — Medication 600 MILLIGRAM(S): at 07:08

## 2022-04-05 RX ADMIN — LIDOCAINE 1 PATCH: 4 CREAM TOPICAL at 18:42

## 2022-04-05 RX ADMIN — LIDOCAINE 1 PATCH: 4 CREAM TOPICAL at 20:14

## 2022-04-05 RX ADMIN — Medication 650 MILLIGRAM(S): at 20:45

## 2022-04-05 RX ADMIN — Medication 800 MILLIGRAM(S): at 14:58

## 2022-04-05 RX ADMIN — Medication 650 MILLIGRAM(S): at 20:00

## 2022-04-05 RX ADMIN — Medication 650 MILLIGRAM(S): at 13:20

## 2022-04-05 RX ADMIN — PANTOPRAZOLE SODIUM 40 MILLIGRAM(S): 20 TABLET, DELAYED RELEASE ORAL at 07:08

## 2022-04-05 RX ADMIN — Medication 0.25 MILLIGRAM(S): at 12:21

## 2022-04-05 RX ADMIN — LAMOTRIGINE 150 MILLIGRAM(S): 25 TABLET, ORALLY DISINTEGRATING ORAL at 20:00

## 2022-04-05 RX ADMIN — ATORVASTATIN CALCIUM 20 MILLIGRAM(S): 80 TABLET, FILM COATED ORAL at 20:00

## 2022-04-05 RX ADMIN — LIDOCAINE 1 PATCH: 4 CREAM TOPICAL at 14:26

## 2022-04-05 RX ADMIN — Medication 0.25 MILLIGRAM(S): at 18:45

## 2022-04-05 RX ADMIN — LAMOTRIGINE 150 MILLIGRAM(S): 25 TABLET, ORALLY DISINTEGRATING ORAL at 08:07

## 2022-04-05 RX ADMIN — Medication 650 MILLIGRAM(S): at 12:40

## 2022-04-05 RX ADMIN — Medication 600 MILLIGRAM(S): at 08:00

## 2022-04-05 RX ADMIN — Medication 800 MILLIGRAM(S): at 15:44
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
## 2022-04-05 NOTE — BH INPATIENT PSYCHIATRY ASSESSMENT NOTE - NSCOMMENTSUICRISKFACT_PSY_ALL_CORE
Pt denies intent or plan to self harm, but on admission stated that she felt "unsafe" as she felt herself "gravitating" towards a knife in the home
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
## 2022-04-05 NOTE — BH INPATIENT PSYCHIATRY ASSESSMENT NOTE - CURRENT MEDICATION
MEDICATIONS  (STANDING):  atorvastatin 20 milliGRAM(s) Oral at bedtime  lamoTRIgine 150 milliGRAM(s) Oral two times a day  pantoprazole    Tablet 40 milliGRAM(s) Oral before breakfast  prazosin. 1 milliGRAM(s) Oral at bedtime    MEDICATIONS  (PRN):  acetaminophen     Tablet .. 650 milliGRAM(s) Oral every 6 hours PRN Moderate Pain (4 - 6)  clonazePAM  Tablet 0.25 milliGRAM(s) Oral every 6 hours PRN anxiety  ibuprofen  Tablet. 800 milliGRAM(s) Oral every 6 hours PRN Mild Pain (1 - 3), Moderate Pain (4 - 6)  LORazepam     Tablet 2 milliGRAM(s) Oral every 6 hours PRN Agitation/ anxiety  LORazepam   Injectable 2 milliGRAM(s) IntraMuscular Once PRN SEVERE AGITATION/ SEVER ANXIETY  QUEtiapine 50 milliGRAM(s) Oral at bedtime PRN off label for sleep disturbances
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
## 2022-04-05 NOTE — BH INPATIENT PSYCHIATRY ASSESSMENT NOTE - DESCRIPTION
single, domiciled. currently has a BF. unemployed. 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 has a pet cat named Anival. no reported access to guns.

## 2022-04-05 NOTE — BH PSYCHOLOGY - GROUP THERAPY NOTE - NSBHPSYCHOLPARTICIPCOMMENT_PSY_A_CORE FT
Patient appeared attentive and interested in the group discussion.  Although the patient did not contribute spontaneously or volunteer to read during the group session, patient was able to follow along with the group discussion. Patient was able to engage with the  appropriately.

## 2022-04-05 NOTE — BH PSYCHOLOGY - GROUP THERAPY NOTE - NSPSYCHOLGRPGENPT_PSY_A_CORE FT
Patient attended the cognitive behavior therapy group session. Group session focused on the stages of change.  Discussion revolved around learning how to navigate the stages of Precontemplation, Contemplation, Preparation, Action, and Maintenance. Techniques focusing on learning about the different aspects of each stage.  Strategies for change as well as reviewing the possibilities of relapse were also discussed.  Group expectations and guidelines, as well as confidentiality and its limitations, were addressed. Principles of cognitive behavior therapy related to today's group topic were reviewed and discussed. Group members illustrated understanding of these concepts by giving personal examples based on their current experiences. Discussions stemmed from the group topics to the causal connection between thoughts, feelings, and behaviors.

## 2022-04-05 NOTE — BH INPATIENT PSYCHIATRY ASSESSMENT NOTE - NSACTIVEVENT_PSY_ALL_CORE
current [place] conflict "triggering her PTSD symptoms"/Triggering events leading to humiliation, shame, and/or despair (e.g., Loss of relationship, financial or health status) (real or anticipated)

## 2022-04-05 NOTE — BH INPATIENT PSYCHIATRY ASSESSMENT NOTE - RISK ASSESSMENT
Protective factors include no violence history, poor compliance to medication, no recent SAs, no current objective findings for acute suicidality; supportive housing, no access to guns, no substance abuse, willingness to seek help, no homicidal ideation, help seeking  Chronic elevated background risk given risk factors include history of suicidal thoughts in the past/suicidal gestures, multiple hospitalizations,  limited social supports and BPD. at this time, is at Low imminent risk of self harm.  however, cannot be safely discharged back to the community - she is unable to partake towards safety planning. moreso, is requesting psych admission

## 2022-04-05 NOTE — BH INPATIENT PSYCHIATRY ASSESSMENT NOTE - NSBHCHARTREVIEWVS_PSY_A_CORE FT
Vital Signs Last 24 Hrs  T(C): 36.4 (04-05-22 @ 06:45), Max: 36.7 (04-04-22 @ 17:27)  T(F): 97.5 (04-05-22 @ 06:45), Max: 98 (04-04-22 @ 17:27)  HR: 92 (04-04-22 @ 17:27) (92 - 92)  BP: 127/88 (04-04-22 @ 17:27) (127/88 - 127/88)  BP(mean): --  RR: 16 (04-04-22 @ 17:27) (16 - 16)  SpO2: 100% (04-04-22 @ 17:27) (100% - 100%)    Orthostatic VS  04-05-22 @ 06:45  Lying BP: --/-- HR: --  Sitting BP: 118/87 HR: 92  Standing BP: 113/77 HR: 98  Site: upper left arm  Mode: electronic  Orthostatic VS  04-04-22 @ 19:40  Lying BP: --/-- HR: --  Sitting BP: 113/79 HR: 87  Standing BP: 117/79 HR: 97  Site: --  Mode: --

## 2022-04-05 NOTE — PSYCHIATRIC REHAB INITIAL EVALUATION - NSBHPRRECOMMEND_PSY_ALL_CORE
Writer met with patient to orient to unit and introduce self, psychiatric rehabilitation staff and department functions. In response to the COVID-19 outbreak, there has been a shift in hospital wide policies and protocols. As a result, it should be noted that unit programming will be re-evaluated on a consistent basis in effort to maintain safety guidelines. Writer encouraged patient to attend psychiatric rehabilitation groups and engage in treatment. Patient was initially receptive to meeting with writer and then began irritable and terminated session early. Patient was somewhat forthcoming with personal data and information surrounding admitting circumstances. Patient was admitted to [facility] due to increased anxiety. Patient reported that the war in [place] has triggered her PTSD leading to nightmares, poor sleep, and anxiety. Writer and patient were able to establish a collaborative psychiatric rehabilitation goal. Psychiatric Rehabilitation staff will continue to engage patient daily in order to develop therapeutic rapport.

## 2022-04-05 NOTE — BH INPATIENT PSYCHIATRY ASSESSMENT NOTE - OTHER PAST PSYCHIATRIC HISTORY (INCLUDE DETAILS REGARDING ONSET, COURSE OF ILLNESS, INPATIENT/OUTPATIENT TREATMENT)
with multiple past psych admissions including [facility] last in 3/2019; SI back in 2021  with past hx of Borderline Personality Disorder, Bipolar Disorder and post-traumatic stress disorder, hx of suicide attempt vs gesture (as per the chart- superficially cut her neck with glass, did not require sutures.) back in 2014  currently, not in psych care  last seen by Dr Lemus back in 11/8/2021; used to see therapist, Addie Salcido - last attended session back in 7/26/2021  She is currently under care of Dr Amaro at adult ambulatory care clinic,

## 2022-04-06 VITALS — TEMPERATURE: 98 F

## 2022-04-06 LAB
A1C WITH ESTIMATED AVERAGE GLUCOSE RESULT: 5.8 % — HIGH (ref 4–5.6)
CHOLEST SERPL-MCNC: 188 MG/DL — SIGNIFICANT CHANGE UP
ESTIMATED AVERAGE GLUCOSE: 120 — SIGNIFICANT CHANGE UP
HDLC SERPL-MCNC: 80 MG/DL — SIGNIFICANT CHANGE UP
LIPID PNL WITH DIRECT LDL SERPL: 92 MG/DL — SIGNIFICANT CHANGE UP
NON HDL CHOLESTEROL: 108 MG/DL — SIGNIFICANT CHANGE UP
TRIGL SERPL-MCNC: 78 MG/DL — SIGNIFICANT CHANGE UP

## 2022-04-06 PROCEDURE — 99238 HOSP IP/OBS DSCHRG MGMT 30/<: CPT

## 2022-04-06 RX ORDER — PANTOPRAZOLE SODIUM 20 MG/1
1 TABLET, DELAYED RELEASE ORAL
Qty: 14 | Refills: 0
Start: 2022-04-06 | End: 2022-04-19

## 2022-04-06 RX ORDER — LAMOTRIGINE 25 MG/1
1 TABLET, ORALLY DISINTEGRATING ORAL
Qty: 28 | Refills: 0
Start: 2022-04-06 | End: 2022-04-19

## 2022-04-06 RX ORDER — ATORVASTATIN CALCIUM 80 MG/1
1 TABLET, FILM COATED ORAL
Qty: 14 | Refills: 0
Start: 2022-04-06 | End: 2022-04-19

## 2022-04-06 RX ORDER — CLONAZEPAM 1 MG
0.25 TABLET ORAL
Qty: 14 | Refills: 0
Start: 2022-04-06 | End: 2022-04-19

## 2022-04-06 RX ADMIN — Medication 800 MILLIGRAM(S): at 10:42

## 2022-04-06 RX ADMIN — PANTOPRAZOLE SODIUM 40 MILLIGRAM(S): 20 TABLET, DELAYED RELEASE ORAL at 08:13

## 2022-04-06 RX ADMIN — LIDOCAINE 1 PATCH: 4 CREAM TOPICAL at 08:13

## 2022-04-06 RX ADMIN — LAMOTRIGINE 150 MILLIGRAM(S): 25 TABLET, ORALLY DISINTEGRATING ORAL at 08:13

## 2022-04-06 RX ADMIN — Medication 800 MILLIGRAM(S): at 07:46

## 2022-04-06 RX ADMIN — Medication 650 MILLIGRAM(S): at 07:41

## 2022-04-06 RX ADMIN — Medication 650 MILLIGRAM(S): at 10:42

## 2022-04-06 RX ADMIN — Medication 650 MILLIGRAM(S): at 02:15

## 2022-04-06 RX ADMIN — Medication 650 MILLIGRAM(S): at 10:19

## 2022-04-06 RX ADMIN — Medication 0.25 MILLIGRAM(S): at 02:56

## 2022-04-06 NOTE — BH SOCIAL WORK INITIAL PSYCHOSOCIAL EVALUATION - OTHER PAST PSYCHIATRIC HISTORY (INCLUDE DETAILS REGARDING ONSET, COURSE OF ILLNESS, INPATIENT/OUTPATIENT TREATMENT)
As per report: Pt is a 46 yr old female, single, domiciled and unemployed.  with paste hx of bipolar disorder, PTSD, borderline personality disorder and Psychogenic Seizures.  with past psych admissions including last hospitalization to [facility] back in 6/2021 due to "acute judson".. currently, not in psych care. Used to follow up with [facility]. claimed she has been out of treatment for the last 6 months.  Has hx of suicide attempt vs gesture - superficially cut her neck with glass (which did not require suturing) in 2014. denied any illicit substance use including alcohol.  Pertinent medical issues: endometriosis, chronic pains and ? seizure disorder.  This afternoon, presented to the ED BIB EMS after she called 911 due to feeling increasingly overwhelmed and anxious re: recent events in the [place] (which triggered her PTSD symptoms).

## 2022-04-06 NOTE — BH INPATIENT PSYCHIATRY PROGRESS NOTE - NSBHMETABOLIC_PSY_ALL_CORE_FT
BMI: BMI (kg/m2): 23.3 (04-04-22 @ 13:15)  HbA1c: A1C with Estimated Average Glucose Result: 5.8 % (04-06-22 @ 10:59)    Glucose: POCT Blood Glucose.: 99 mg/dL (06-25-21 @ 07:15)    BP: 131/82 (04-05-22 @ 21:25) (127/88 - 160/100)  Lipid Panel: Date/Time: 04-06-22 @ 10:59  Cholesterol, Serum: 188  Direct LDL: --  HDL Cholesterol, Serum: 80  Total Cholesterol/HDL Ration Measurement: --  Triglycerides, Serum: 78

## 2022-04-06 NOTE — BH INPATIENT PSYCHIATRY DISCHARGE NOTE - NSDCMRMEDTOKEN_GEN_ALL_CORE_FT
atorvastatin 20 mg oral tablet: 1 tab(s) orally once a day (at bedtime)  clonazePAM: 0.25 tab(s) orally every 6 hours as needed  lamoTRIgine 150 mg oral tablet: 1 tab(s) orally 2 times a day  pantoprazole 40 mg oral delayed release tablet: 1 tab(s) orally once a day (before a meal)

## 2022-04-06 NOTE — BH INPATIENT PSYCHIATRY DISCHARGE NOTE - NSBHMETABOLIC_PSY_ALL_CORE_FT
BMI: BMI (kg/m2): 23.3 (04-04-22 @ 13:15)  HbA1c: A1C with Estimated Average Glucose Result: 5.5 % (06-21-21 @ 09:21)    Glucose: POCT Blood Glucose.: 99 mg/dL (06-25-21 @ 07:15)    BP: 131/82 (04-05-22 @ 21:25) (127/88 - 160/100)  Lipid Panel: Date/Time: 06-21-21 @ 09:24  Cholesterol, Serum: 248  Direct LDL: --  HDL Cholesterol, Serum: 74  Total Cholesterol/HDL Ration Measurement: --  Triglycerides, Serum: 89

## 2022-04-06 NOTE — BH INPATIENT PSYCHIATRY DISCHARGE NOTE - OTHER PAST PSYCHIATRIC HISTORY (INCLUDE DETAILS REGARDING ONSET, COURSE OF ILLNESS, INPATIENT/OUTPATIENT TREATMENT)
with multiple past psych admissions including [facility] last in 3/2019; SI back in 2021  with past hx of Borderline Personality Disorder, Bipolar Disorder and post-traumatic stress disorder, hx of suicide attempt vs gesture (as per the chart- superficially cut her neck with glass, did not require sutures.) back in 2014  currently, not in psych care  last seen by Dr Lemus back in 11/8/2021; used to see therapist, Addie Salcido - last attended session back in 7/26/2021  She is currently under care of Dr Amaro at adult ambulatory [facility] clinic,

## 2022-04-06 NOTE — BH INPATIENT PSYCHIATRY DISCHARGE NOTE - NSDCCPCAREPLAN_GEN_ALL_CORE_FT
PRINCIPAL DISCHARGE DIAGNOSIS  Diagnosis: Bipolar disorder  Assessment and Plan of Treatment:       SECONDARY DISCHARGE DIAGNOSES  Diagnosis: Borderline personality disorder  Assessment and Plan of Treatment:     Diagnosis: Post traumatic stress disorder (PTSD)  Assessment and Plan of Treatment:

## 2022-04-06 NOTE — BH DISCHARGE NOTE NURSING/SOCIAL WORK/PSYCH REHAB - NSDCPRGOAL_PSY_ALL_CORE
Writer met with patient to discuss patient’s discharge and progress towards rehabilitation goals. Patient was superficially cooperative when meeting with writer. Patient was able to complete safety plan prior to discharge. Patient was admitted to [facility] L[facility] on 4/4/22 due to increased anxiety and PTSD. Patient was discharged today on a 3DL. Patient stated that being in the hospital was counterproductive to her working on her mental health because she was limited in the ways that she is able to take care of herself while hospitalized. Patient stated that she is able to keep herself safe at home and denied SI/HI/AVH. Patient was adherent to medication while on the unit. Patient has met her psychiatric rehabilitation goal of identifying and utilizing coping skills. Patient identified music, art, drinking tea and utilizing social supports as ways to better manage her symptoms. Due to the COVID-19 pandemic, unit structure and activities are re-evaluated on a consistent basis in effort to maintain the safety of patients and staff. Patient attended one psychology group and no psych rehab groups while on the unit. Patient was visible and at times, demanding with staff. Patient socialized with select peers and presented with fair ADLs.

## 2022-04-06 NOTE — BH INPATIENT PSYCHIATRY PROGRESS NOTE - CURRENT MEDICATION
MEDICATIONS  (STANDING):  atorvastatin 20 milliGRAM(s) Oral at bedtime  lamoTRIgine 150 milliGRAM(s) Oral two times a day  lidocaine   4% Patch 1 Patch Transdermal daily  pantoprazole    Tablet 40 milliGRAM(s) Oral before breakfast  prazosin. 1 milliGRAM(s) Oral at bedtime    MEDICATIONS  (PRN):  acetaminophen     Tablet .. 650 milliGRAM(s) Oral every 6 hours PRN Moderate Pain (4 - 6)  clonazePAM  Tablet 0.25 milliGRAM(s) Oral every 6 hours PRN anxiety  ibuprofen  Tablet. 800 milliGRAM(s) Oral every 12 hours PRN Moderate Pain (4 - 6)  LORazepam     Tablet 2 milliGRAM(s) Oral every 6 hours PRN Agitation/ anxiety  LORazepam   Injectable 2 milliGRAM(s) IntraMuscular Once PRN SEVERE AGITATION/ SEVER ANXIETY  QUEtiapine 50 milliGRAM(s) Oral at bedtime PRN off label for sleep disturbances

## 2022-04-06 NOTE — BH DISCHARGE NOTE NURSING/SOCIAL WORK/PSYCH REHAB - PATIENT PORTAL LINK FT
You can access the FollowMyHealth Patient Portal offered by [facility] by registering at the following website: [email or web address]. By joining [facility]’s FollowMyHealth portal, you will also be able to view your health information using other applications (apps) compatible with our system.

## 2022-04-06 NOTE — BH INPATIENT PSYCHIATRY PROGRESS NOTE - NSBHASSESSSUMMFT_PSY_ALL_CORE
46 yr old female, single, domiciled and unemployed.  with paste hx of bipolar disorder, PTSD, borderline personality disorder and Psychogenic Seizures.  with past psych admissions including last hospitalization to [facility] back in 6/2021 due to "acute judson".. currently, not in psych care. Used to follow up with CORRIE RAMÍREZ. claimed she has been out of treatment for the last 6 months.  Has hx of suicide attempt vs gesture - superficially cut her neck with glass (which did not require suturing) in 2014. denied any illicit substance use including alcohol.  Pertinent medical issues: endometriosis, chronic pains and ? seizure disorder.  This afternoon, presented to the ED BIB EMS after she called 911 due to feeling increasingly overwhelmed and anxious re: recent events in the [place] (which triggered her PTSD symptoms).      Today, pt submitted a 3DL for release overnight and was discharged to her own care AMA. Pt adamantly denied SIIP or HIIP and agreed to call 911 or go to the nearest ED if safety concerns should arise in the community

## 2022-04-06 NOTE — BH INPATIENT PSYCHIATRY PROGRESS NOTE - PRN MEDS
MEDICATIONS  (PRN):  acetaminophen     Tablet .. 650 milliGRAM(s) Oral every 6 hours PRN Moderate Pain (4 - 6)  clonazePAM  Tablet 0.25 milliGRAM(s) Oral every 6 hours PRN anxiety  ibuprofen  Tablet. 800 milliGRAM(s) Oral every 12 hours PRN Moderate Pain (4 - 6)  LORazepam     Tablet 2 milliGRAM(s) Oral every 6 hours PRN Agitation/ anxiety  LORazepam   Injectable 2 milliGRAM(s) IntraMuscular Once PRN SEVERE AGITATION/ SEVER ANXIETY  QUEtiapine 50 milliGRAM(s) Oral at bedtime PRN off label for sleep disturbances
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
## 2022-04-06 NOTE — BH INPATIENT PSYCHIATRY DISCHARGE NOTE - NSBHDCHANDOFFNOFT_PSY_A_CORE
Pt leaving on a 3DL and declined any services from this facility including finding follow up care. Pt educated on use of 911 or going to the nearest ED if safety concerns should arise in the community.

## 2022-04-06 NOTE — BH INPATIENT PSYCHIATRY PROGRESS NOTE - NSBHCHARTREVIEWVS_PSY_A_CORE FT
Vital Signs Last 24 Hrs  T(C): 36.6 (04-06-22 @ 07:02), Max: 36.6 (04-06-22 @ 07:02)  T(F): 97.9 (04-06-22 @ 07:02), Max: 97.9 (04-06-22 @ 07:02)  HR: 81 (04-05-22 @ 21:25) (81 - 81)  BP: 131/82 (04-05-22 @ 21:25) (131/82 - 131/82)  BP(mean): --  RR: --  SpO2: --    Orthostatic VS  04-06-22 @ 07:02  Lying BP: --/-- HR: --  Sitting BP: 117/80 HR: 90  Standing BP: 110/75 HR: 100  Site: upper left arm  Mode: electronic  Orthostatic VS  04-05-22 @ 06:45  Lying BP: --/-- HR: --  Sitting BP: 118/87 HR: 92  Standing BP: 113/77 HR: 98  Site: upper left arm  Mode: electronic  Orthostatic VS  04-04-22 @ 19:40  Lying BP: --/-- HR: --  Sitting BP: 113/79 HR: 87  Standing BP: 117/79 HR: 97  Site: --  Mode: --

## 2022-04-06 NOTE — BH DISCHARGE NOTE NURSING/SOCIAL WORK/PSYCH REHAB - NSDCPLANREVIEWED_PSY_ALL_CORE
eMERGENCY dEPARTMENT eNCOUnter      CHIEF COMPLAINT    Chief Complaint   Patient presents with   • Shortness of Breath   • Weakness       HPI    Jonah Ceron is a 77 year old male with history of ESRD on dialysis 3x weekly, paroxysmal A fib, HTN, pulmonary HTN, morbid obesity, Hypothyroidism, T2DM, HFrEF, schizophrenia who presents to the emergency department with fatigue. 2 weeks prior the patient began to have general malaise and \"felt sick\". Since then he has missed 5 days of dialysis, although he states he is taking his medications daily. The patient continues to feel fatigued and SOB with activity. He also has a 2 week history of intermittent epigastric pain pain that does worsen occasionally with activity and is not improved by anything. He additionally endorses worsening swelling in his legs B/L, although he states this is a chronic issue, orthopnea, and PND. The patient is oliguric at baseline. He denies any fever, chills, productive cough, nausea, vomiting.    ALLERGIES    ALLERGIES:   Allergen Reactions   • Nsaids      ulcer   • Latex Dermatitis   • Metolazone Other (See Comments)     May have contributed to High uric acid level 8/2015 hospitalization       CURRENT MEDICATIONS    Current Facility-Administered Medications   Medication Dose Route Frequency Provider Last Rate Last Dose   • sodium citrate anticoagulant 4 % flush 3 mL  3 mL Intercatheter PRN Samantha Vallecillo MD       • sodium chloride (NORMAL SALINE) 0.9 % bolus 100-200 mL  100-200 mL Intravenous PRN Samantha Vallecillo MD       • albumin human (SPA) 25 % injection 12.5 g  12.5 g Intravenous PRN Samantha Vallecillo MD           PAST MEDICAL HISTORY    Past Medical History:   Diagnosis Date   • Acquired coagulation factor deficiency (CMS/HCC)    • Anxiety    • Arthritis     legs   • Asthma    • Atrial fibrillation (CMS/HCC)    • Blood clot associated with vein wall inflammation     2000   • CHF (congestive heart failure) (CMS/HCC)    • Chronic  kidney disease (CKD), stage III (moderate)    • Coronary artery disease, PCI RCA 7/2014 12/5/2016   • Depression    • Diabetes mellitus (CMS/HCC)     retinopathy with neuropathy   • Diabetic gastroparesis (CMS/HCC)    • GI bleed 8/2009    Coumadin stopped   • Gout    • Hemodialysis patient (CMS/HCC)     M-W-F at [facility] [phone]  // Permcath   • Hypertension    • Hypothyroidism 3/8/2015   • Memory deficit     some memory difficulty    • Multinodular goiter    • Obesity    • MAURICE (obstructive sleep apnea)     has machine but was told he doesn't need anymore   • Other and unspecified hyperlipidemia    • Other chronic pain     back pain    • Paroxysmal atrial fibrillation (CMS/HCC)    • PE (pulmonary embolism)    • Pneumonia    • Pulmonary hypertension (CMS/HCC)    • Retinopathy    • Schizophrenia (CMS/HCC)    • Vitamin D deficiency        SURGICAL HISTORY    Past Surgical History:   Procedure Laterality Date   • Av fistula placement Right 11/11/2015    clotted since 11/2016   • Cardioversion  12/12/2006   • Colonoscopy diagnostic  10/2/14    3yr recall, 1 polyp tubular adenoma   • Colonoscopy w/ polypectomy  08/06/2009   • Coronary angiogram - cv  12/11/2006    nl cor, sev PHTN   • Echocardiogram  7/9/2010    EF 62%   • Esophagogastroduodenoscopy transoral flex w/bx single or mult  01/12/2006    EGD with Bx   • Laceration repair  1970    Stitches left arm, steel fell on his left arm while he was at work, he was 29 years old.   • Permacath  03/03/2017    placed for dialysis while in hospital   • Thyroidectomy  10/14/2014    total thyroidectomy        SOCIAL HISTORY    Social History     Social History   • Marital status: Single     Spouse name: N/A   • Number of children: N/A   • Years of education: N/A     Social History Main Topics   • Smoking status: Former Smoker     Packs/day: 0.00     Years: 49.00     Quit date: 3/19/2008   • Smokeless tobacco: Never Used   • Alcohol use 12.0 oz/week     20 Cans  Yes of beer per week      Comment: once in a while   • Drug use: No   • Sexual activity: Not Currently     Partners: Female     Other Topics Concern   • None     Social History Narrative    Lives alone in apartment, has PCW and RN to help with medications       FAMILY HISTORY    Family History   Problem Relation Age of Onset   • Cancer Mother         Ovarian   • Diabetes Mother    • Hypertension Mother    • High blood pressure Mother    • Other Father         Ulcers   • Diabetes Brother    • High blood pressure Daughter    • Heart disease Daughter    • Coronary Artery Disease Other         no family hx premature CAD       REVIEW OF SYSTEMS      Constitutional:  Denies fever or chills.   Eyes:  Denies change in visual acuity, no diplopia  HENT:  Denies nasal congestion or sore throat.   Respiratory:  Denies cough. Endorses SOB.  Cardiovascular:  Denies palpitations. Endorses B/L LE swelling, orthopnea, PND  GI:  Denies abdominal pain. No nausea or vomiting. No diarrhea. Endorses constipation.  :  Denies dysuria. No hematuria. Oliguria at baseline.  Musculoskeletal:  Denies back pain or joint pain.   Skin:  Denies rash. No petechia.  Neurologic:  Denies headache. No weakness. No paresthesias.   Endocrine:  Denies polyuria or polydipsia.   Lymphatic:  Denies swollen glands or edema  Psychiatric:  Denies depression or anxiety.    PHYSICAL EXAM    Vitals:    09/07/18 1136 09/07/18 1212 09/07/18 1236 09/07/18 1306   BP: 157/74 154/70 152/69 120/56   Pulse: 60 60 58 56   Resp: 12 23 26 19   Temp:       TempSrc:       SpO2: 97% 98% 97%    Weight:       Height:           Pulse Ox / Interpretation:  96% on RA    Constitutional:  Well developed, well nourished. No acute distress, non-toxic appearance.   Eyes:  PERRL, EOMI.  Conjunctivae normal.   HENT:  Atraumatic. External ears normal. Nose normal. Oropharynx moist. No drooling, no stridor.   Neck:  Supple. Normal range of motion. No tenderness. No meningeal signs  Respiratory:   No respiratory distress. No rales. No wheezing. No retractions. Bibasilar crackles, R>L.  Cardiovascular:  Normal rate, normal rhythm. No murmurs, gallops, or rubs.  GI:  Soft, nondistended, nonrigid, no guarding. Normal bowel sounds. No hepatomegaly or splenomegaly.  No rebound or guarding. Negative Morales's sign. Negative McBurney's point. Epigastric tenderness to palpation.  :  No costovertebral angle tenderness.   Musculoskeletal:  No tenderness. No deformities. Back - no muscular or point vertebral tenderness. No homans. B/L LE 4+ Edema.  Skin:  Warm and dry. Well hydrated. No rashes.  No petechiae or purpura.   Lymphatic:  No anterior or posterior cervical lymphadenopathy noted. No submandibular lymphadenopathy.  Neurologic:  Alert & oriented x 3.  Normal motor function. Normal sensory function. No focal deficits noted. GCS 15. Tardive Dyskinesia.  Psychiatric:  Speech and behavior appropriate.   Pulses:  DP/PT 2+, Radial 2+      EKG    Preliminary EKG interpretation: per my review:  Rate: 69  Rhythm: normal sinus rhythm   Abnormality: Old septal infarct, left anterior fascicular block, t wave inversions throughout, unchanged from previous  Interpreted by Joyce Merrill MD      RADIOLOGY    Imaging Results          XR Chest PA and Lateral (Final result)  Result time 09/07/18 10:51:51    Final result                 Impression:    IMPRESSION: No acute cardiopulmonary disease.               Narrative:    HISTORY: Shortness of breath.    EXAM: PA and Lateral CXR.    COMPARISON: July 11, 2018    FINDINGS: The lungs are clear, with sharp costophrenic angles. The heart  size and pulmonary vasculature are normal. The mediastinal contour is  normal. There is no pneumothorax.                                I have reviewed radiology images and impressions    LABS    Results for orders placed or performed during the hospital encounter of 09/07/18   CBC & Auto Differential   Result Value    WBC 5.5    RBC 2.70 (L)     HGB 8.6 (L)    HCT 26.0 (L)    MCV 96.3    MCH 31.9    MCHC 33.1    RDW-CV 15.0        DIFF TYPE AUTOMATED DIFFERENTIAL    Neutrophil 59    LYMPH 26    MONO 11    EOSIN 3    BASO 1    Absolute Neutrophil 3.3    Absolute Lymph 1.4    Absolute Mono 0.6    Absolute Eos 0.2    Absolute Baso 0.1   Comprehensive Metabolic Panel   Result Value    Sodium 144    Potassium 6.1 (H)    Chloride 105    Carbon Dioxide 14 (L)    Anion Gap 31 (H)    Glucose 83     (H)    Creatinine 29.85 (H)    GFR Estimate,  1     Comment: eGFR <15 mL/min/1.73m2 = Kidney failure or Stage 5 CKD (chronic kidney disease).    GFR Estimate, Non  1     Comment: eGFR <15 mL/min/1.73m2 = Kidney failure or Stage 5 CKD (chronic kidney disease).    BUN/Creatinine Ratio 5 (L)    CALCIUM 6.4 (L)    TOTAL BILIRUBIN 0.4    AST/SGOT 98 (H)    ALT/SGPT 78    ALK PHOSPHATASE 138 (H)    TOTAL PROTEIN 7.4    Albumin 3.1 (L)    GLOBULIN 4.3 (H)    A/G Ratio, Serum 0.7 (L)   Lipase Level   Result Value    Lipase 373   Troponin I Ultra Sensitive   Result Value    TROPONIN I 0.11 (HH)     Comment: Consistent with myocardial injury.  Mild elevations of  troponin may indicate noninfarction cardiac injury or early myocardial infarction.  Serial studies may be helpful.  CALLED TO, READ BACK AND CONFIRMED  TENZIN JIN AT 1105 ON 9/7/18 BY NL     Metered blood glucose   Result Value    Glucose Bedside POC 49 (LL)   Metered blood glucose   Result Value    Glucose Bedside POC 57 (L)       I have reviewed lab results    RECHECKS   Medications   sodium citrate anticoagulant 4 % flush 3 mL (not administered)   sodium chloride (NORMAL SALINE) 0.9 % bolus 100-200 mL (not administered)   albumin human (SPA) 25 % injection 12.5 g (not administered)   calcium gluconate 1 g in dextrose 5 % 60 mL total volume IVPB (0 g Intravenous Completed 9/7/18 1205)   sodium bicarbonate 8.4 % injection 50 mEq (50 mEq Intravenous Given 9/7/18 1140)   dextrose  50 % injection 25 g (25 g Intravenous Given 9/7/18 1137)   insulin regular (human) (HumuLIN R, NovoLIN R) injection 5 Units (5 Units Intravenous Given 9/7/18 1139)   dextrose 50 % injection 25 g (25 g Intravenous Given 9/7/18 1335)         ED COURSE & MEDICAL DECISION MAKING    77 year old male presents to the emergency department with complaints of fatigue and SOB. The patient missed 5 sessions of dialysis and is now fluid overloaded with B/L LE edema and bibasilar crackles. Although the patient stated that he \"felt sick\", WBC was normal and no other obvious source of infection was identified. May have had a viral illness which has since resolved and now patient has fatigue/malaise from hemodialysis noncompliance. pt also has hypothyroidism which maybe worsened but pt states he's taking his meds. Patient was found to be hyperkalemic to 6.1 with QTC prolongation so given 1 g IVcalcium gluconate and 10 units IV regular insulin with D50. BUN was additionally elevated to 149. Patient did not show any signs of renal encephalopathy: A/Ox3, no confusion, no asterixis.    Patient does describe episodes of intermittent atypical chest pain although he does not have any chest pain at present. Troponin was elevated to 0.11 and EKG showed previous septal infarct. The increased troponin is likely due to ESRD as opposed to NSTEMI. CXR was normal. Lipase was normal.    A repeat EKG was performed which was otherwise unchanged.    Given patient's fluid status and electrolyte abnormalities, the patient was admitted under the teaching team. Nephrologist, Dr. Hernandez made aware of his admission.     IMPRESSION:      ICD-10-CM    1. Hyperkalemia E87.5    2. ESRD (end stage renal disease) (CMS/[facility]) N18.6    3. Hypervolemia, unspecified hypervolemia type E87.70    4. Troponin level elevated R74.8    5. Fatigue, unspecified type R53.83      On 9/7/2018, Dawson DUFF scribed the services personally performed by Dr. Joyce Merrill      The  documentation recorded by the scribe accurately and completely reflects the service(s) I personally performed and the decisions made by me.              Joyce Merrill MD  09/07/18 2635

## 2022-04-06 NOTE — BH DISCHARGE NOTE NURSING/SOCIAL WORK/PSYCH REHAB - DISCHARGE INSTRUCTIONS AFTERCARE APPOINTMENTS
In order to check the location, date, or time of your aftercare appointment, please refer to your Discharge Instructions Document given to you upon leaving the hospital.  If you have lost the instructions please call [phone]

## 2022-04-06 NOTE — BH INPATIENT PSYCHIATRY PROGRESS NOTE - NSBHFUPINTERVALHXFT_PSY_A_CORE
Pt seen with SW present. Pt submitted a 3DL requesting discharge overnight. Pt adamantly denies SIIP and stated "I want to live, I love my boyfriend!" and that she never thought to self harm even on admission, "I just wanted to be safe. I wasn't going to hurt myself!" Pt noted to take her first dose of prazosin overnight for PTSD sxs and she stated that she felt immediately that her blood pressure dropped (VS WNL) and she became lightheaded.  When told what her VS were, pt stated, "Well they took so long taking it that I was angry and my blood pressure went up." Pt declined continuing prazosin. Pt declined any prescriptions from this writer and states that she has lamictal, klonopin, protonix, and lipitor at home, "I have plenty of medications. I'm a very good patient." Pt declined social work services and did not want follow up care appts to be made. Pt states that she is seeing another psychiatrist besides Dr. Lemus, but declined to say who she's been seeing. Pt denies SIIP, HIIP, A/VH, or paranoia. Pt educated on the use of 911 or going to the nearest ED if safety concerns should arise in the community; pt verbalized understanding. Pt discharged to her own care AMA today.

## 2022-04-06 NOTE — BH DISCHARGE NOTE NURSING/SOCIAL WORK/PSYCH REHAB - NSCDUDCCRISIS_PSY_A_CORE
[facility] Well  1 [phone] [facility]-WELL ([phone])  Text "WELL" to [phone]  Website: www.[employer]/.[facility] 1 (466) 211-JBKH (4405) Website: www.safehorizon.org/.National Suicide Prevention Lifeline 3 (026) 559-8270/.  [employer]  1 (880) LIFENET (074-5137)/.  [facility]  [address] [place] [phone]   Hours:  Monday through Friday from 9 AM to 3 PM/.  [employer] - [facility] Crisis Line  [phone], Option 1

## 2022-04-06 NOTE — BH DISCHARGE NOTE NURSING/SOCIAL WORK/PSYCH REHAB - NSDCPRRECOMMEND_PSY_ALL_CORE
Psychiatric Rehabilitation staff recommends that patient continues to engage in outpatient treatment for ongoing medication management, support, and psychotherapy. In addition, psych rehab recommends patient continue to explore and utilize coping skills for improved symptom management and sustained recovery.

## 2022-04-06 NOTE — BH INPATIENT PSYCHIATRY DISCHARGE NOTE - HPI (INCLUDE ILLNESS QUALITY, SEVERITY, DURATION, TIMING, CONTEXT, MODIFYING FACTORS, ASSOCIATED SIGNS AND SYMPTOMS)
Per ED assessment: "46 yr old female, single, domiciled and unemployed.  with paste hx of bipolar disorder, PTSD, borderline personality disorder and Psychogenic Seizures.  with past psych admissions including last hospitalization to [facility] back in 6/2021 due to "acute judson".. currently, not in psych care. Used to follow up with [facility]. claimed she has been out of treatment for the last 6 months.  Has hx of suicide attempt vs gesture - superficially cut her neck with glass (which did not require suturing) in 2014. denied any illicit substance use including alcohol.  Pertinent medical issues: endometriosis, chronic pains and ? seizure disorder.  This afternoon, presented to the ED BIB EMS after she called 911 due to feeling increasingly overwhelmed and anxious re: recent events in the [place] (which triggered her PTSD symptoms).      she is seen bedside. reported that for the past few days, has been sad and anxious; feeling increasingly overwhelmed [place] claims that for the last 3 weeks, she has volunteered with a [employer] organization. 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 said organization is using various social media platform like [employer], face book, etc. during her time volunteering, she claimed coming across graphic images of the war. described as seeing "dead bodies, misery, burnt houses, people crying, etc.". has been feeling sorry for the plight of the people of [facility]. she reports being triggered by these graphic images - her own "traumatic event during childhood".. which she refused to further elaborate. "I don't feel like talking about her", she says in reference to the trauma experienced during her childhood.     as she had been feeling upset, overwhelmed by these recent experiences (as described above), she began to get increasingly anxious.. no SI but short of having passive SI - as she admitted "not feeling safe at home" this morning.. worried that "she might do something impulsively". she reports having a "huge knife" at the house. hence, decided that she needed help - and subsequently called 911.  on top of this, reported that she has been out of treatment for the past 6 months. used to be under the care of Dr Lemus but claimed that she did not find him helpful towards managing her symptoms. does not want to be prescribed antidepressant medications nor any benzodiazepines for concern that it might trigger her manic episode. claimed she last felt manic back in 6/2021. described symptoms as experiencing racing thoughts along with elevated mood and sleep disturbances. since then, there has been no recurrence of said manic episode. rather for the past few months, has been having bouts of feeling depressed and anxious. denied experiencing any specific anxiety disorder symptoms. She is not feeling paranoid. NO perceptual disturbances experienced.  denied abusing any drugs including alcohol.     see [facility] notes for collateral information obtained"

## 2022-04-25 ENCOUNTER — EMERGENCY (EMERGENCY)
Facility: HOSPITAL | Age: 47
LOS: 1 days | Discharge: HOME | End: 2022-04-25
Attending: STUDENT IN AN ORGANIZED HEALTH CARE EDUCATION/TRAINING PROGRAM | Admitting: STUDENT IN AN ORGANIZED HEALTH CARE EDUCATION/TRAINING PROGRAM
Payer: MEDICARE

## 2022-04-25 VITALS
SYSTOLIC BLOOD PRESSURE: 158 MMHG | OXYGEN SATURATION: 99 % | HEART RATE: 111 BPM | TEMPERATURE: 100 F | HEIGHT: 67 IN | DIASTOLIC BLOOD PRESSURE: 100 MMHG | RESPIRATION RATE: 18 BRPM

## 2022-04-25 VITALS
RESPIRATION RATE: 18 BRPM | HEART RATE: 109 BPM | DIASTOLIC BLOOD PRESSURE: 102 MMHG | OXYGEN SATURATION: 99 % | SYSTOLIC BLOOD PRESSURE: 155 MMHG

## 2022-04-25 DIAGNOSIS — F60.3 BORDERLINE PERSONALITY DISORDER: ICD-10-CM

## 2022-04-25 DIAGNOSIS — F31.9 BIPOLAR DISORDER, UNSPECIFIED: ICD-10-CM

## 2022-04-25 PROCEDURE — 99283 EMERGENCY DEPT VISIT LOW MDM: CPT | Mod: FS

## 2022-04-25 PROCEDURE — 90792 PSYCH DIAG EVAL W/MED SRVCS: CPT

## 2022-04-25 RX ORDER — CLONAZEPAM 1 MG
0.5 TABLET ORAL ONCE
Refills: 0 | Status: DISCONTINUED | OUTPATIENT
Start: 2022-04-25 | End: 2022-04-25

## 2022-04-25 RX ADMIN — Medication 0.5 MILLIGRAM(S): at 14:14

## 2022-04-25 RX ADMIN — Medication 0.5 MILLIGRAM(S): at 15:54

## 2022-04-25 NOTE — ED PROVIDER NOTE - OBJECTIVE STATEMENT
45 y/o female hx bipolar/anxiety/depression ptsd  epilepsy presents to ER c/o seizures. Pt. states for the past few days she has been having focal seizure - unable to explain what those seizures have been - when asked further she states " I have been having these focal seizures the whole time we have been talking " (no focal seizures witnessed while we were talking). Pt. extremely tangential and poor historian - appears extremely manic at this time and cannot give any further information at this time.  Pt. states also feeling odd sensaitons down the left side of her body.

## 2022-04-25 NOTE — ED BEHAVIORAL HEALTH ASSESSMENT NOTE - RISK ASSESSMENT
Protective factors include no violence history, poor compliance to medication, no recent SAs, no current objective findings for acute suicidality; supportive housing, no access to guns, no substance abuse, willingness to seek help, no homicidal ideation, help seeking  Chronic elevated background risk given risk factors include history of suicidal thoughts in the past/suicidal gestures, multiple hospitalizations,  limited social supports and BPD. at this time, is at Low imminent risk of self harm. Low Acute Suicide Risk

## 2022-04-25 NOTE — ED PROVIDER NOTE - PROGRESS NOTE DETAILS
ELIAS Jessica - patient was seen and evaluated by psychiatry and cleared from a psychiatric standpoint for patients bipolar and judson. Both myself and Dr. Narvaez had lengthy conversation with patient about medical aspect of patients work up including low suspicion for focal seizures or concern for grand mal seizures. Pt. reiterated she is seizing in front of us when in fact no seizure activity has been witnessed. Pt. became extremely upset after discussion when we made it clear to her that she is both medically and psychiatrically cleared and is stable fo discharge. States she needs another dose of ativan (discussed with psychiatry which agreed with small one time dose) and also wants more oxygen which she was told she does not need. Vital signs are stable and patient is stable for discharge.   Risks, benefits explained in full.  All questions answered.  Patient is alert oriented to person, place and time and understands the plan that discussed.

## 2022-04-25 NOTE — ED BEHAVIORAL HEALTH ASSESSMENT NOTE - SUMMARY
46/F with hx of bipolar disorder, PTSD, borderline personality disorder and Psychogenic Seizures; has multiple psych admissions; is still not in psych care after her recent [facility] discharge.  Pt has hx of suicide attempt vs gesture - superficially cut her neck with glass (which did not require suturing) in 2014. there is no hx of illicit substance.  Pertinent medical issues: endometriosis, chronic pains and ? seizure disorder.  Today, presented to the ED BIB EMS as Pt claimed that she was having "focal seizures" for the last few days.    currently presents with intermittent FOI but overall, is not formally thought disordered; is delusional - delusions are predominantly somatic stemming from belief that she is a "long hauler" - from Covid - which precipitated her intermittent bouts of CP and SOB; also claimed to have SOB and "allergic reactions" after her second does of Pfizer covid vaccine last 5/2021.. is in belief that ED providers are not convinced that she has seizure disorder. she feels "dismissed by them".. that ED providers do not acknowledge the existence of a primary neurologic disorder as she claims.   at this time, the Pt is not having any active/ passive SI/HI.   There are no signs/symptoms of severe depression or florid psychosis.  whilst Pt does have some intermittent FOI and is hyperverbal, there is nothing suggestive that this current presentation is attributable to a full blown manic episode.  Pt is also somatically preoccupied which is helping propagate her current anxiety.  otherwise, there is nothing to justify pursuing involuntary psych admission. she was offered voluntary admission but she refused.  Pt is medically cleared.. from the psychiatric perspective, he is also cleared. she will continue to follow up with psych services on an out patient basis       RECOMMENDATIONS:   1. Psychoeducation provided.  Encouraged continued compliance with  all prescribed meds. once again, also encouraged to follow up with OP psych services (of her choice).  No indication for emergent psych meds changes at this time. we also discussed with her re: role of psychotherapy.  Pt expressed that she is open to this once she is able to establish an out Pt psychiatry clinic follow up.    2. Emergency protocol reviewed.  Pt was adviced to call 911 or come to the nearest ED should symptoms worsen; have increasing bouts of agitation/aggressive behavior; having SI/HI; or call [phone]    3. given resources to the community

## 2022-04-25 NOTE — ED PROVIDER NOTE - ATTENDING APP SHARED VISIT CONTRIBUTION OF CARE
I have personally performed a face to face medical and diagnostic evaluation of the patient. I have discussed with and reviewed the DHIRAJ's note and agree with the History, ROS, Physical Exam and MDM unless otherwise indicated. A brief summary of my personal evaluation and impression can be found below.    49F hx of bipolar/anxiety/depression/ptst/epilepsy/borderline personality dx, presents with a cc of c/f recurring focal seizure episodes, pt reports for last few days she is having increasing episodes of "focal seizures" - she is unable to further characterize where/when or what her symptoms are for these seiures, she reports that she is having seizures in front of the medical staff - without any abnormal activity noted - she is extremely tangental, disorganized and has rapid emotional swings during the assessment, she reports she was supposed to go to medical school, but then claims her mother is worried about her, and then discusses that her neurologist that she has tried to get into contact with is not returning her calls, she intermittent demands for oxygen because "it makes me feel better" she dies SOB ARGUETA or chest discomfort, she was reassuring her vital signs are normal, but then again demands she needs to go home with oxygen, obtaining a comprehensive history is extremely challenging, her thoughts are very disorganized and requires frequent redirection when attempting to discuss her concerns for her focal seizures. She denies SI HI. She reports she has been seen at multiple hospitals for the same issue - but again when is asked to explain her focal seizure symptoms she replies, "I'm having a seizure right now, in front of you, while we're talking, I think that's obvious" Again, no abnormal activity was noted.     All other ROS negative, except as above and as per HPI and ROS section.    VITALS: Initial triage and subsequent vitals have been reviewed by me.  GEN APPEARANCE: WDWN, alert, non-toxic, NAD  HEAD: Atraumatic.  EYES: PERRLa, EOMI, vision grossly intact.   NECK: Supple  CV: RRR, S1S2, no c/r/m/g. Cap refill <2 seconds. No bruits.   LUNGS: CTAB. No abnormal breath sounds.  ABDOMEN: Soft, NTND. No guarding or rebound.   MSK/EXT: No spinal or extremity point tenderness. No CVA ttp. Pelvis stable. No peripheral edema.  NEURO: Alert, follows commands. Weight bearing normal. Speech normal. Sensation and motor normal x4 extremities.   SKIN: Warm, dry and intact. No rash.  PSYCH: manic, disorganized, tangental, requires frequent redirection, splitting staff members, emotionally labile     Plan/MDM: 49F hx of bipolar/anxiety/depression/ptst/epilepsy/borderline personality dx, presents with a cc of c/f recurring focal seizure episodes, pt reports for last few days she is having increasing episodes of "focal seizures" - she is unable to further characterize where/when or what her symptoms are for these seizures, she reports that she is having seizures in front of the medical staff - without any abnormal activity noted exam vss non toxic PE as above ddx low c/f acute episodes of focal seizures, as exam and presentation not c/w epileptic activity, c/f more likely judson, will discuss with , reassess thereafter.

## 2022-04-25 NOTE — ED BEHAVIORAL HEALTH ASSESSMENT NOTE - MEDICATIONS (PRESCRIPTIONS, DIRECTIONS)
continue all prescribed meds: Lamictal 150mgBID and Klonopin 0.25mg PRN q6Hrs; Protonix 40mg daily and Lipitor 20mg HS

## 2022-04-25 NOTE — ED BEHAVIORAL HEALTH ASSESSMENT NOTE - HPI (INCLUDE ILLNESS QUALITY, SEVERITY, DURATION, TIMING, CONTEXT, MODIFYING FACTORS, ASSOCIATED SIGNS AND SYMPTOMS)
46 yr old female, single, domiciled and unemployed.  Has past hx of bipolar disorder, PTSD, borderline personality disorder and Psychogenic Seizures.  there are multiple psych admissions including recent hospitalization to [facility] due to worsening anxiety; prior [facility] admission in 6/2021 for "acute judson".  Pt is still not in psych care after her recent [facility] discharge.  she used to follow up at our [facility].  Pt has hx of suicide attempt vs gesture - superficially cut her neck with glass (which did not require suturing) in 2014. there is no hx of illicit substance.  Pertinent medical issues: endometriosis, chronic pains and ? seizure disorder.  Today, presented to the ED BIB EMS as Pt claimed that she was having "focal seizures" for the last few days.      Pt was brought to the main ED wherein she was medically cleared.  Pt was unable to further elaborate on the characteristics of these seizure events.  Pt complained "feeling odd sensations down the left side of her body".   A Psych consult was requested for evaluation of possible judson.      she is seen bedside.  appeared calm and cooperative. is hyperverbal but not pressured. at times, having FOI but overall, TP is linear. claims that today, had another seizure event.. as previously encountered with main ED providers, the Pt was unable to fully characterize on these seizure events. Pt reports that since last Saturday, she had been at 2 different EDs ([place] then at [place]). she claims that both these ED visits were precipitated by seizures. at [place], she was told that "she was not having any seizure event".. was subsequently discharged.  outside of [place], she claimed had another seizure event. she asked for 02 whilst sitting down at the [facility] lobby.. nobody paid attention to her - she left "dismissed by the medical providers there." hence, decided to call 911 again. was apparently taken to [facility] ED.. wherein she was also seen by a medical provider. was also apparently discharged.. she currently, reports feeling frustrated as nobody seems to be listening to what she is saying.     she is convinced that ED providers do not believe that she has seizures.. hence today, claimed she called EMS in order to come to [facility] and finally, get "medically evaluated".  at this time, she claimed that she has been a "little bit manic".  symptoms described as "feeling anxiety" - because nobody seems to be understanding her medical condition; i.e. her seizures.  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 otherwise, denied having any increased goal directed activities or engaged in risk taking behavior; feels "happy at times" but denied having sustained elevated mood.  denied any increased in energy level causing sleep disruption.  does not feel depressed. not feeling hopeless, helpless or worthless. denied harboring any SI or HI.  whilst does claim to feel anxious described as "an overall sense of feeling nervous", she adamantly denied experiencing any specific anxiety disorder symptoms. no signs/ symptoms suggestive of psychosis.. i.e. is not feeling paranoid. denied any perceptual disturbances.  she claims to be compliant with her meds.      see [facility] notes for collateral information obtained

## 2022-04-25 NOTE — ED BEHAVIORAL HEALTH ASSESSMENT NOTE - DETAILS
PATIENT IS NOT SUICIDAL [facility], 4/4-4/6/2022 under the service of ALVARADO Neville and Dr Endy Quijano oxycodone, Sudafed documented past hx of Attempt vs gesture via cutting neck superficially using a  glass in 2014 email sent to ALVARADO Neville and Dr Quijano discussed with ED providers, Dr Narvaez and ELIAS Jessica

## 2022-04-25 NOTE — ED BEHAVIORAL HEALTH ASSESSMENT NOTE - CURRENT MEDICATION
after she was discharged from [facility], service recommended that Pt continue to be on Lamictal 150mgBID and Klonopin 0.25mg PRN q6Hrs   [place]  Reference #: [id number]:   05/10/2021	04/19/2022	curaleaf (20:1) 5mgthc and 0.25 mgcbd/curachew orange	1	2	Sultana Sam ()	[id number]	[place]	[facility]  03/18/2022	03/23/2022	clonazepam 0.5 mg tablet	20	10	Serena Morfin	[id number]	Medicare	[id number]  05/11/2021	03/28/2022	forte lozenge 5mg thc: <0.1mg cbd/lozenge	2	6	Sultana Sam ()	[id number]	[place]	[facility]    ** as per her recent admitted to [facility], 4/4-4/6/2022 under the service of Samantha Neville and Endy Quijano: the Pt during her admission to the unit, was started on prazoson 1mg HS, Klonopin 0.25mg q6Hrs PRN  PRN, motrin 800mg q12Hrs PRN, tylenol 650mg q6Hrs PRN, lidocaine patch daily (12 hours on/12 hours off) and medicine consulted.  Pt later during the course of her [facility] admission, told staff that she did not feel her pain was being properly managed on the unit.  Hence, demanded discharge. Staff reportedly informed Pt of 3DL.. Pt became agitated and accused staff of keeping her a prisoner.  Pt later on submitted a 3DL letter requesting release on 4/5/22. Pt was seen by this writer and SW on 4/6/22 and Pt adamantly denied active or passive SI

## 2022-04-25 NOTE — ED PROVIDER NOTE - CARE PLAN
1 Principal Discharge DX:	Worried well   Principal Discharge DX:	Worried well  Secondary Diagnosis:	Bipolar disorder  Secondary Diagnosis:	Amina

## 2022-04-25 NOTE — ED BEHAVIORAL HEALTH NOTE - BEHAVIORAL HEALTH NOTE
Writer called pt's friend Jesus [phone] who provided the following information.  He states he used to live with patient, but not anymore states she is too much.  He states patient demands a lot of attention and he does not have enough attention to give her.  He states he doesn't want to sound like a jerk, but he is beginning to think patient doesn't have anything to do and gets bored so she calls the ambulance.  He states she might legitimately have problems that she needs help with, but does not believe she's a threat to herself.  He states patient is on the computer all day making political statements, but she needs a social life.  He states maybe patient needs different medications.  Pt sends him 117 texts daily with updates such as, "I can't breath", "Im having a seizure", " I didn't have a seizure".  He states it's too much and she needs more help or support than he can provide as a friend.  Patient will get upset when he talks about something he doesn't like and tell him she didn't hear it because she had a Focal seizure.  He does not have any safety concerns and does not feel patient is a danger to herself.

## 2022-04-25 NOTE — ED PROVIDER NOTE - NS ED ATTENDING STATEMENT MOD
This was a shared visit with the DHIRAJ. I reviewed and verified the documentation and independently performed the documented:

## 2022-04-25 NOTE — ED ADULT NURSE NOTE - OBJECTIVE STATEMENT
patient is showing signs of judson. Patient is a&ox4 but having flight of ideas, unable to direct patient. Patient going on and on about different situations not pertaining to chief complaints. Patient stating she has seizures, but focal seizures but stating she is having it as you are speaking to her. Paitent denies chest pain, sob.

## 2022-04-25 NOTE — ED BEHAVIORAL HEALTH ASSESSMENT NOTE - OTHER
CVM, [place]  at times, FOI - but overall, is not disorganized in TP appeared as stated age,  not disheveled. does not appear to be internally preoccupied. no tongue bite.

## 2022-04-25 NOTE — ED ADULT NURSE NOTE - CHIEF COMPLAINT QUOTE
pt coming from home.  pt c/o "I keep having focal seizures".  pt hyperverbal.  pt states "I have continuos seizures all day".  pt aox4 in triage.  PMH: PTSD, bipolar 1, epilepsy

## 2022-04-25 NOTE — ED PROVIDER NOTE - CLINICAL SUMMARY MEDICAL DECISION MAKING FREE TEXT BOX
47 y/o female c/o "focal seizures" for multiple days   -unlikely seizures, probable manic episode  -patient neurologically intact and no witnessed seizure observed

## 2022-04-25 NOTE — ED BEHAVIORAL HEALTH ASSESSMENT NOTE - DESCRIPTION
single, domiciled. currently has a BF. unemployed. 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 has a pet cat named Anival. no reported access to guns. Since her ED arrival, the Pt has been calm and cooperative.  There has been no reported agitation/aggressive behavior.  No verbalization of active/ passive SI/HI.   There are no signs/symptoms of severe depression or florid psychosis.  Pt is not showing any signs/symptoms of intoxication or withdrawal.  apart from being somatically preoccupied, the Pt has not tested limits.. she is not delirious.. Has maintained appropriate boundaries. Pt has been easily redirected.  Overall, there has been no management issues.    Vital Signs Last 24 Hrs  T(C): 37.7 (25 Apr 2022 13:18), Max: 37.7 (25 Apr 2022 13:18)  T(F): 99.8 (25 Apr 2022 13:18), Max: 99.8 (25 Apr 2022 13:18)  HR: 109 (25 Apr 2022 14:36) (109 - 111)  BP: 155/102 (25 Apr 2022 14:36) (155/102 - 158/100)  BP(mean): --  RR: 18 (25 Apr 2022 14:36) (18 - 18)  SpO2: 99% (25 Apr 2022 14:36) (99% - 99%) Nonepileptic seizures, GERD and HLD. current meds: Protonix 40mg daily and Lipitor 20mg HS

## 2022-04-25 NOTE — ED BEHAVIORAL HEALTH ASSESSMENT NOTE - NSSUICPROTFACT_PSY_ALL_CORE
Identifies reasons for living/Supportive social network of family or friends/Fear of death or the actual act of killing self/Positive therapeutic relationships/Beloved pets

## 2022-04-25 NOTE — ED BEHAVIORAL HEALTH ASSESSMENT NOTE - OTHER PAST PSYCHIATRIC HISTORY (INCLUDE DETAILS REGARDING ONSET, COURSE OF ILLNESS, INPATIENT/OUTPATIENT TREATMENT)
hx of Bipolar disorder, PTSD and Borderline Personality Disorder  with multiple past psych admissions including [facility] last 4/4-4/6/2022 due to "worsening anxiety symptoms; previous [facility] admission back in 3/2019; [facility] in 2021  documented hx of suicide attempt vs gesture (as per the chart- superficially cut her neck with glass, did not require sutures) in 2014  previous [facility] OPD psychiatrist: Dr Lemus last seen in 11/8/2021; previous therapist, Addie Salcido - last attended session in 7/26/2021

## 2022-04-25 NOTE — PROGRESS NOTE BEHAVIORAL HEALTH - ORIENTED TO PERSON
April 25, 2022      Jesus Martinez  [address] [address]        To Whom It May Concern:    Jesus Martinez was seen in our clinic. He was out for medical reasons 3/18/2022-4/23/2022. He may return to work without restrictions.      Sincerely,        KAREN Fagan CNP          
Yes

## 2022-04-25 NOTE — ED ADULT TRIAGE NOTE - CHIEF COMPLAINT QUOTE
pt coming from home.  pt c/o "I keep having focal seizures".  pt hyperverbal.  pt states "I have continuos seizures all day".  pt aox4 in triage pt coming from home.  pt c/o "I keep having focal seizures".  pt hyperverbal.  pt states "I have continuos seizures all day".  pt aox4 in triage.  PMH: PTSD, bipolar 1, epilepsy

## 2022-04-25 NOTE — ED PROVIDER NOTE - PATIENT PORTAL LINK FT
You can access the FollowMyHealth Patient Portal offered by  by registering at the following website: [email or web address]. By joining [employer]’s FollowMyHealth portal, you will also be able to view your health information using other applications (apps) compatible with our system.

## 2022-06-12 ENCOUNTER — EMERGENCY (EMERGENCY)
Facility: HOSPITAL | Age: 47
LOS: 1 days | Discharge: HOME | End: 2022-06-12
Attending: EMERGENCY MEDICINE
Payer: MEDICARE

## 2022-06-12 VITALS
WEIGHT: 145.06 LBS | RESPIRATION RATE: 18 BRPM | DIASTOLIC BLOOD PRESSURE: 113 MMHG | SYSTOLIC BLOOD PRESSURE: 166 MMHG | HEIGHT: 67 IN | TEMPERATURE: 99 F | HEART RATE: 115 BPM

## 2022-06-12 VITALS
OXYGEN SATURATION: 98 % | TEMPERATURE: 97 F | SYSTOLIC BLOOD PRESSURE: 149 MMHG | HEART RATE: 118 BPM | DIASTOLIC BLOOD PRESSURE: 88 MMHG | RESPIRATION RATE: 20 BRPM | HEIGHT: 67 IN | WEIGHT: 145.06 LBS

## 2022-06-12 VITALS
TEMPERATURE: 99 F | DIASTOLIC BLOOD PRESSURE: 87 MMHG | RESPIRATION RATE: 18 BRPM | OXYGEN SATURATION: 98 % | HEART RATE: 97 BPM | SYSTOLIC BLOOD PRESSURE: 142 MMHG

## 2022-06-12 LAB
ALBUMIN SERPL ELPH-MCNC: 4 G/DL — SIGNIFICANT CHANGE UP (ref 3.5–5)
ALP SERPL-CCNC: 73 U/L — SIGNIFICANT CHANGE UP (ref 40–120)
ALT FLD-CCNC: 19 U/L DA — SIGNIFICANT CHANGE UP (ref 10–60)
ANION GAP SERPL CALC-SCNC: 9 MMOL/L — SIGNIFICANT CHANGE UP (ref 5–17)
APTT BLD: 28.6 SEC — SIGNIFICANT CHANGE UP (ref 27.5–35.5)
AST SERPL-CCNC: 11 U/L — SIGNIFICANT CHANGE UP (ref 10–40)
BASOPHILS # BLD AUTO: 0.02 K/UL — SIGNIFICANT CHANGE UP (ref 0–0.2)
BASOPHILS NFR BLD AUTO: 0.2 % — SIGNIFICANT CHANGE UP (ref 0–2)
BILIRUB SERPL-MCNC: 0.4 MG/DL — SIGNIFICANT CHANGE UP (ref 0.2–1.2)
BUN SERPL-MCNC: 6 MG/DL — LOW (ref 7–18)
CALCIUM SERPL-MCNC: 10.1 MG/DL — SIGNIFICANT CHANGE UP (ref 8.4–10.5)
CHLORIDE SERPL-SCNC: 104 MMOL/L — SIGNIFICANT CHANGE UP (ref 96–108)
CO2 SERPL-SCNC: 24 MMOL/L — SIGNIFICANT CHANGE UP (ref 22–31)
CREAT SERPL-MCNC: 0.91 MG/DL — SIGNIFICANT CHANGE UP (ref 0.5–1.3)
EGFR: 78 ML/MIN/1.73M2 — SIGNIFICANT CHANGE UP
EOSINOPHIL # BLD AUTO: 0.07 K/UL — SIGNIFICANT CHANGE UP (ref 0–0.5)
EOSINOPHIL NFR BLD AUTO: 0.5 % — SIGNIFICANT CHANGE UP (ref 0–6)
GLUCOSE SERPL-MCNC: 94 MG/DL — SIGNIFICANT CHANGE UP (ref 70–99)
HCG SERPL-ACNC: <1 MIU/ML — SIGNIFICANT CHANGE UP
HCT VFR BLD CALC: 39.8 % — SIGNIFICANT CHANGE UP (ref 34.5–45)
HGB BLD-MCNC: 13.3 G/DL — SIGNIFICANT CHANGE UP (ref 11.5–15.5)
IMM GRANULOCYTES NFR BLD AUTO: 0.4 % — SIGNIFICANT CHANGE UP (ref 0–1.5)
INR BLD: 1.13 RATIO — SIGNIFICANT CHANGE UP (ref 0.88–1.16)
LYMPHOCYTES # BLD AUTO: 19.6 % — SIGNIFICANT CHANGE UP (ref 13–44)
LYMPHOCYTES # BLD AUTO: 2.58 K/UL — SIGNIFICANT CHANGE UP (ref 1–3.3)
MAGNESIUM SERPL-MCNC: 1.9 MG/DL — SIGNIFICANT CHANGE UP (ref 1.6–2.6)
MCHC RBC-ENTMCNC: 30.4 PG — SIGNIFICANT CHANGE UP (ref 27–34)
MCHC RBC-ENTMCNC: 33.4 GM/DL — SIGNIFICANT CHANGE UP (ref 32–36)
MCV RBC AUTO: 91.1 FL — SIGNIFICANT CHANGE UP (ref 80–100)
MONOCYTES # BLD AUTO: 1.05 K/UL — HIGH (ref 0–0.9)
MONOCYTES NFR BLD AUTO: 8 % — SIGNIFICANT CHANGE UP (ref 2–14)
NEUTROPHILS # BLD AUTO: 9.4 K/UL — HIGH (ref 1.8–7.4)
NEUTROPHILS NFR BLD AUTO: 71.3 % — SIGNIFICANT CHANGE UP (ref 43–77)
NRBC # BLD: 0 /100 WBCS — SIGNIFICANT CHANGE UP (ref 0–0)
NT-PROBNP SERPL-SCNC: 237 PG/ML — HIGH (ref 0–125)
PLATELET # BLD AUTO: 377 K/UL — SIGNIFICANT CHANGE UP (ref 150–400)
POTASSIUM SERPL-MCNC: 3.8 MMOL/L — SIGNIFICANT CHANGE UP (ref 3.5–5.3)
POTASSIUM SERPL-SCNC: 3.8 MMOL/L — SIGNIFICANT CHANGE UP (ref 3.5–5.3)
PROT SERPL-MCNC: 7.1 G/DL — SIGNIFICANT CHANGE UP (ref 6–8.3)
PROTHROM AB SERPL-ACNC: 13.5 SEC — HIGH (ref 10.5–13.4)
RAPID RVP RESULT: SIGNIFICANT CHANGE UP
RBC # BLD: 4.37 M/UL — SIGNIFICANT CHANGE UP (ref 3.8–5.2)
RBC # FLD: 13.4 % — SIGNIFICANT CHANGE UP (ref 10.3–14.5)
SARS-COV-2 RNA SPEC QL NAA+PROBE: SIGNIFICANT CHANGE UP
SODIUM SERPL-SCNC: 137 MMOL/L — SIGNIFICANT CHANGE UP (ref 135–145)
TROPONIN I, HIGH SENSITIVITY RESULT: 5.6 NG/L — SIGNIFICANT CHANGE UP
WBC # BLD: 13.17 K/UL — HIGH (ref 3.8–10.5)
WBC # FLD AUTO: 13.17 K/UL — HIGH (ref 3.8–10.5)

## 2022-06-12 PROCEDURE — 99285 EMERGENCY DEPT VISIT HI MDM: CPT | Mod: 25

## 2022-06-12 PROCEDURE — 36415 COLL VENOUS BLD VENIPUNCTURE: CPT

## 2022-06-12 PROCEDURE — 0225U NFCT DS DNA&RNA 21 SARSCOV2: CPT

## 2022-06-12 PROCEDURE — 99283 EMERGENCY DEPT VISIT LOW MDM: CPT

## 2022-06-12 PROCEDURE — 84702 CHORIONIC GONADOTROPIN TEST: CPT

## 2022-06-12 PROCEDURE — 85730 THROMBOPLASTIN TIME PARTIAL: CPT

## 2022-06-12 PROCEDURE — 99285 EMERGENCY DEPT VISIT HI MDM: CPT

## 2022-06-12 PROCEDURE — 84484 ASSAY OF TROPONIN QUANT: CPT

## 2022-06-12 PROCEDURE — 85610 PROTHROMBIN TIME: CPT

## 2022-06-12 PROCEDURE — 71045 X-RAY EXAM CHEST 1 VIEW: CPT | Mod: 26

## 2022-06-12 PROCEDURE — 80053 COMPREHEN METABOLIC PANEL: CPT

## 2022-06-12 PROCEDURE — 71045 X-RAY EXAM CHEST 1 VIEW: CPT

## 2022-06-12 PROCEDURE — 82962 GLUCOSE BLOOD TEST: CPT

## 2022-06-12 PROCEDURE — 83880 ASSAY OF NATRIURETIC PEPTIDE: CPT

## 2022-06-12 PROCEDURE — 99282 EMERGENCY DEPT VISIT SF MDM: CPT

## 2022-06-12 PROCEDURE — 85025 COMPLETE CBC W/AUTO DIFF WBC: CPT

## 2022-06-12 PROCEDURE — 83735 ASSAY OF MAGNESIUM: CPT

## 2022-06-12 PROCEDURE — 93005 ELECTROCARDIOGRAM TRACING: CPT

## 2022-06-12 NOTE — ED ADULT TRIAGE NOTE - CHIEF COMPLAINT QUOTE
Pt BIBA for medical evaluation s/p pt put cortisone cream on rash had anaphylaxis reaction this occurred at 1:45am took 150mg po of benadryl. Pt talking in complete sentences, no drooling noted.

## 2022-06-12 NOTE — ED ADULT NURSE NOTE - NSIMPLEMENTINTERV_GEN_ALL_ED
Implemented All Universal Safety Interventions:  [place] to call system. Call bell, personal items and telephone within reach. Instruct patient to call for assistance. Room bathroom lighting operational. Non-slip footwear when patient is off stretcher. Physically safe environment: no spills, clutter or unnecessary equipment. Stretcher in lowest position, wheels locked, appropriate side rails in place.

## 2022-06-12 NOTE — ED PROVIDER NOTE - NSICDXPASTMEDICALHX_GEN_ALL_CORE_FT
I have reviewed and confirmed nurses' notes...
PAST MEDICAL HISTORY:  Bipolar 1 disorder     Endometriosis     Epilepsy     Genital herpes     High cholesterol     Major depression     PTSD (post-traumatic stress disorder)     Seizures

## 2022-06-12 NOTE — ED PROVIDER NOTE - PATIENT PORTAL LINK FT
You can access the FollowMyHealth Patient Portal offered by [facility] by registering at the following website: [email or web address]. By joining [employer]’s FollowMyHealth portal, you will also be able to view your health information using other applications (apps) compatible with our system.

## 2022-06-12 NOTE — ED PROVIDER NOTE - PATIENT PORTAL LINK FT
You can access the FollowMyHealth Patient Portal offered by [facility] by registering at the following website: [email or web address]. By joining [employer]’s FollowMyHealth portal, you will also be able to view your health information using other applications (apps) compatible with our system.

## 2022-06-12 NOTE — ED PROVIDER NOTE - CLINICAL SUMMARY MEDICAL DECISION MAKING FREE TEXT BOX
pt says she was having an allergic reaction for which she took benadryl at home.  Symptoms have currently resolved and patient feels back to baseline.  AOx3, denies suicidal or homocidal ideation.  Will dc

## 2022-06-12 NOTE — ED ADULT NURSE NOTE - OBJECTIVE STATEMENT
Pt BIBA r/t anaphylaxis reaction . Pt verbalizes using cortisone cream for rash and had a reaction after a minute. Took benadryl 150 mg po prior visiting er

## 2022-06-12 NOTE — ED PROVIDER NOTE - OBJECTIVE STATEMENT
pt came to ED for possible allergic reaction.  As per patient, she had a rash on her arm.  She then put a cortisone cream on the rash.  the patient than thinks she was having a reaction to the cream so she took some benadryl.  Pt says the rash has resolved and she feels fine currently.  pt denies suicidal or homoocidal ideation.  pt walking around ED, clinically sober with stable gait.

## 2022-06-12 NOTE — ED PROVIDER NOTE - PHYSICAL EXAMINATION
Afebrile, hemodynamically stable, saturating well on RA  NAD, well appearing, sitting comfortably in bed, no WOB, speaking full sentences  Head NCAT  EOMI grossly, anicteric  MMM  No JVD  RRR, nml S1/S2, no m/r/g, L chest palp acutely reproduces pain  Lungs CTAB, no w/r/r  Abd soft, NT, ND, nml BS, no rebound or guarding  AAO, CN's 3-12 grossly intact  LEE spontaneously, no leg cyanosis or edema, 2+ symm radial pulse  Skin warm, well perfused, no rashes or hives

## 2022-06-12 NOTE — ED PROVIDER NOTE - NSFOLLOWUPINSTRUCTIONS_ED_ALL_ED_FT
Please follow up with your primary care doctor, cardiologist, and neurologist in 1-2 days.  Please return to the emergency department if you have worsening pain, shortness of breath, dizziness, vomiting, fever, or any other symptoms.      Chest Pain    WHAT YOU NEED TO KNOW:    Chest pain can be caused by a range of conditions, from not serious to life-threatening. Chest pain can be a symptom of a digestive problem, such as acid reflux or a stomach ulcer. An anxiety attack or a strong emotion, such as anger, can also cause chest pain. Infection, inflammation, or a fracture in the bones or cartilage in your chest can cause pain or discomfort. Sometimes chest pain or pressure is caused by poor blood flow to your heart (angina). Chest pain may also be caused by life-threatening conditions such as a heart attack or blood clot in your lungs.    DISCHARGE INSTRUCTIONS:    Call your local emergency number (911 in the ) or have someone call if:   •You have any of the following signs of a heart attack: ?Squeezing, pressure, or pain in your chest      ?You may also have any of the following: ?Discomfort or pain in your back, neck, jaw, stomach, or arm      ?Shortness of breath      ?Nausea or vomiting  ?Lightheadedness or a sudden cold sweat    Return to the emergency department if:   •You have chest discomfort that gets worse, even with medicine.  •You cough or vomit blood.  •Your bowel movements are black or bloody.  •You cannot stop vomiting, or it hurts to swallow.    Call your doctor if:   •You have questions or concerns about your condition or care.    Medicines:   •Medicines may be given to treat the cause of your chest pain. Examples include pain medicine, anxiety medicine, or medicines to increase blood flow to your heart.  •Do not take certain medicines without asking your healthcare provider first. These include NSAIDs, herbal or vitamin supplements, or hormones (estrogen or progestin).  •Take your medicine as directed. Contact your healthcare provider if you think your medicine is not helping or if you have side effects. Tell him or her if you are allergic to any medicine. Keep a list of the medicines, vitamins, and herbs you take. Include the amounts, and when and why you take them. Bring the list or the pill bottles to follow-up visits. Carry your medicine list with you in case of an emergency.    Healthy living tips: The following are general healthy guidelines. If the cause of your chest pain is known, your healthcare provider will give you specific guidelines to follow.  •Do not smoke. Nicotine and other chemicals in cigarettes and cigars can cause lung and heart damage. Ask your healthcare provider for information if you currently smoke and need help to quit. E-cigarettes or smokeless tobacco still contain nicotine. Talk to your healthcare provider before you use these products.  •Choose a variety of healthy foods as often as possible. Include fresh, frozen, or canned fruits and vegetables. Also include low-fat dairy products, fish, chicken (without skin), and lean meats. Your healthcare provider or a dietitian can help you create meal plans. You may need to avoid certain foods or drinks if your pain is caused by a digestion problem.  •Lower your sodium (salt) intake. Limit foods that are high in sodium, such as canned foods, salty snacks, and cold cuts. If you add salt when you cook food, do not add more at the table. Choose low-sodium canned foods as much as possible.  •Drink plenty of water every day. Water helps your body to control your temperature and blood pressure. Ask your healthcare provider how much water you should drink every day.  •Ask about activity. Your healthcare provider will tell you which activities to limit or avoid. Ask when you can drive, return to work, and have sex. Ask about the best exercise plan for you.  •Maintain a healthy weight. Ask your healthcare provider what a healthy weight is for you. Ask him or her to help you create a safe weight loss plan if you are overweight.  •Ask about vaccines you may need. Get the influenza (flu) vaccine every year as soon as recommended, usually in September or October. You may also need a pneumococcal vaccine to prevent pneumonia. The vaccine is usually given every 5 years, starting at age 65. Your healthcare provider can tell you if should get other vaccines, and when to get them.    Follow up with your healthcare provider within 72 hours, or as directed: You may need to return for more tests to find the cause of your chest pain. You may be referred to a specialist, such as a cardiologist or gastroenterologist. Write down your questions so you remember to ask them during your visits.

## 2022-06-12 NOTE — ED PROVIDER NOTE - CLINICAL SUMMARY MEDICAL DECISION MAKING FREE TEXT BOX
Character low suspicion for dissection and no murmur or pulse asymmetry. Character low suspicion for PE and no hypoxia or e/o DVT or acute risk factors for this. Character low suspicion for ACS and ECG/trop unremarkable. No e/o PNA, PTX, or fluid overload on exam or CXR. Pt states has had many presentations for the same and all negative. May be MSK in nature. Also seizures no acute change and will f/u with neurologist. No e/o allergic reaction at this time. Patient is well appearing, NAD, afebrile, hemodynamically stable. Any available tests and studies were discussed with patient. Discharged with instructions in further symptomatic care, return precautions, and need for PMD and neuro f/u.

## 2022-06-12 NOTE — ED PROVIDER NOTE - OBJECTIVE STATEMENT
47yoF with h/o epilepsy, bipolar disorder, depression, presents with L sided CP radiating to L arm, states has happened frequently and has always been negative troponins and went for stress test but could not complete and had other cardiac w/u at that time. Also notes she has focal seizures and has had many of them over the past many months, not acutely changed. Follows with neuro. Denies all other symptoms. Of note, pt states these symptoms started with feeling of throat closing for which she took benadryl and ASA. Was seen earlier today for similar symptoms.

## 2022-08-12 NOTE — ED PROVIDER NOTE - PSYCHIATRIC MEMORY
Patient is scheduled for procedure with Dr. Camejo    Spoke with: Patient    Date of Procedure: 09-01-22    Location: [facility]    Informed patient they will need an adult  Yes    Pre-procedure COVID-19 Test: @ home    Additional comments: N/A    Patient is aware pre-op RN will call 2-3 days prior to procedure with arrival time and instructions. Yes      Rebecca Young on 8/12/2022 at 9:25 AM    
LONG TERM DEFICIT/SHORT TERM DEFICIT

## 2022-08-25 NOTE — ED BEHAVIORAL HEALTH ASSESSMENT NOTE - EMPLOYMENT
Patient need a medication refill. Please advise     Requested Prescriptions     Pending Prescriptions Disp Refills    losartan (COZAAR) 100 mg tablet 90 Tablet 1     Sig: Take 1 Tablet by mouth daily.
Disabled

## 2022-11-08 NOTE — ED ADULT NURSE NOTE - BP NONINVASIVE DIASTOLIC (MM HG)
Outpatient Subjective & Objective      Chief Complaint: Preoperative evaulation, perioperative medical management, and complication reduction plan.     Functional Capacity: can walk up one flight of stairs without CP/SOB.       Anesthesia issues: None    Difficulty mouth opening: No    Steroid use in the last 12 months: No     Dental Issues: No    Family anesthesia difficulty: None       Family Hx of Thrombosis: None    Past Medical History:   Diagnosis Date    Allergy     Diabetes mellitus, type 2     HTN (hypertension)     Low back pain          Past Medical History Pertinent Negatives:   Diagnosis Date Noted    Anemia 11/08/2022    Anxiety 11/08/2022    Coronary artery disease 11/08/2022    Deep vein thrombosis 11/08/2022    Depression 11/08/2022    Disorder of kidney and ureter 11/08/2022    GERD (gastroesophageal reflux disease) 11/08/2022    Hyperlipidemia 11/08/2022    Pulmonary embolism 11/08/2022    Seizures 11/08/2022    Stroke 11/08/2022    Thyroid disease 11/08/2022         Past Surgical History:   Procedure Laterality Date    COLONOSCOPY      leg laceration  1984       Review of Systems   Constitutional:  Negative for chills, fatigue, fever and unexpected weight change.   HENT:  Positive for congestion. Negative for dental problem, hearing loss, postnasal drip, rhinorrhea, sore throat, tinnitus and trouble swallowing.    Eyes:  Negative for photophobia, pain, discharge and visual disturbance.   Respiratory:  Positive for cough (productive). Negative for apnea, chest tightness, shortness of breath and wheezing.    Cardiovascular:  Negative for chest pain, palpitations and leg swelling.   Gastrointestinal:  Negative for abdominal pain, blood in stool, constipation, nausea and vomiting.        Denies Fatty liver, Hepatitis   Endocrine: Positive for cold intolerance. Negative for heat intolerance.   Genitourinary:  Negative for decreased urine volume, difficulty urinating, dysuria, frequency, hematuria and  "urgency.   Musculoskeletal:  Positive for arthralgias (right knee) and back pain. Negative for neck pain and neck stiffness.   Skin:  Negative for rash and wound.   Neurological:  Positive for headaches (occasional). Negative for dizziness, tremors, seizures, syncope, weakness and numbness.   Hematological:  Negative for adenopathy. Does not bruise/bleed easily.   Psychiatric/Behavioral:  Negative for confusion, sleep disturbance and suicidal ideas.             VITALS  Visit Vitals  BP (!) 152/89 (BP Location: Right arm, Patient Position: Sitting)   Pulse 73   Temp 98.2 °F (36.8 °C) (Oral)   Ht 5' 8" (1.727 m)   Wt 116.6 kg (257 lb)   SpO2 99%   BMI 39.08 kg/m²          Physical Exam  Vitals reviewed.   Constitutional:       General: He is not in acute distress.     Appearance: He is well-developed. He is obese.   HENT:      Head: Normocephalic.      Nose: Nose normal.      Mouth/Throat:      Pharynx: No oropharyngeal exudate.   Eyes:      General:         Right eye: No discharge.         Left eye: No discharge.      Conjunctiva/sclera: Conjunctivae normal.      Pupils: Pupils are equal, round, and reactive to light.   Neck:      Thyroid: No thyromegaly.      Vascular: No carotid bruit or JVD.      Trachea: No tracheal deviation.   Cardiovascular:      Rate and Rhythm: Normal rate and regular rhythm.      Pulses:           Carotid pulses are 2+ on the right side and 2+ on the left side.       Dorsalis pedis pulses are 2+ on the right side and 2+ on the left side.        Posterior tibial pulses are 2+ on the right side and 2+ on the left side.      Heart sounds: Normal heart sounds. No murmur heard.  Pulmonary:      Effort: Pulmonary effort is normal. No respiratory distress.      Breath sounds: Normal breath sounds. No stridor. No wheezing, rhonchi or rales.   Abdominal:      General: Bowel sounds are normal. There is no distension.      Palpations: Abdomen is soft.      Tenderness: There is no abdominal tenderness. " There is no guarding.      Comments: central obesity   Musculoskeletal:      Cervical back: Normal range of motion.      Right lower leg: Edema (trace) present.      Left lower leg: No edema.   Lymphadenopathy:      Cervical: No cervical adenopathy.   Skin:     General: Skin is warm and dry.      Capillary Refill: Capillary refill takes less than 2 seconds.      Findings: No erythema or rash.   Neurological:      Mental Status: He is alert and oriented to person, place, and time.      Coordination: Coordination normal.        Significant Labs:  Lab Results   Component Value Date    WBC 7.43 11/08/2022    HGB 14.5 11/08/2022    HCT 44.0 11/08/2022     11/08/2022    ALT 17 03/01/2021    AST 12 03/01/2021     11/08/2022    K 4.4 11/08/2022     11/08/2022    CREATININE 0.9 11/08/2022    BUN 12 11/08/2022    CO2 29 11/08/2022    TSH 1.025 03/01/2021    INR 1.0 11/08/2022    HGBA1C 5.6 11/08/2022       Diagnostic Studies: No relevant studies.    EKG:   Results for orders placed or performed during the hospital encounter of 11/08/22   EKG 12-lead    Collection Time: 11/08/22 12:23 PM    Narrative    Test Reason : I10,    Vent. Rate : 063 BPM     Atrial Rate : 063 BPM     P-R Int : 140 ms          QRS Dur : 086 ms      QT Int : 420 ms       P-R-T Axes : 044 069 049 degrees     QTc Int : 429 ms    Normal sinus rhythm  Normal ECG  When compared with ECG of 13-SEP-2006 06:06,  No significant change was found  Confirmed by PAVEL VAZ MD ([id number]) on 11/8/2022 12:52:14 PM    Referred By: JACE MCGINNIS           Confirmed By:PAVEL VAZ MD           Active Cardiac Conditions: None      Revised Cardiac Risk Index   High -Risk Surgery  Intraperitoneal; Intrathoracic; suprainguinal vascular Yes- + 1 No- 0   History of Ischemic Heart Disease   (Hx of MI/positive exercise test/current chest pain due to ischemia/use of nitrate therapy/EKG with pathological Q waves) Yes- + 1 No- 0   History of CHF  (Pulmonary  edema/bilateral rales or S3 gallop/PND/CXR showing pulmonary vascular redistribution) Yes- + 1 No- 0   History of CVA   (Prior stroke or TIA) Yes- + 1 No- 0   Pre-operative treatment with insulin Yes- + 1 No- 0   Pre-operative creatinine > 2mg/dl Yes- + 1 No- 0   Total: 0      Risk Status:  Estimated risk of cardiac complications after non-cardiac surgery using the Revised Cardiac Risk Index for Preoperative risk is 3.9 %      ARISCAT (Canet) risk index: Low: 1.6% risk of post-op pulmonary complications.    American Society of Anesthesiologists Physical Status classification (ASA): 2    Silvestrell respiratory failure index: 0.5 %         No further cardiac workup needed prior to surgery.    Outpatient Subjective & Objective     75

## 2022-11-18 NOTE — ED PROVIDER NOTE - WET READ LAUNCH FT
Psychiatry Progress Note    Patient:Dawit Desai  Date: 2022    : 1981  AGE: 41 year old          CC: \"Things are ok\"    Source of Information: Patient, EMR    VIDEO CALL:  Patient appropriately identifies self at beginning of call, relays is in a private location, and verbalizes consent to conduct follow up session over video.    Subjective:    Pt has ended his relationship with his wife, although still living together and co-parenting.    Pt feels \"low\" once every few weeks with feeling down for 1 day. Notes when work is busy, he feels better due to having a distraction. Notes when work is slow, he begins to over-think. Notes struggling with motivation at times, once every few weeks. Endorses some anhedonia with difficulty enjoying time with his kids. Pt denies he is at his baseline.     Notes feeling motivated to get better for his children as they are his priority.      ROS: Denies any recent fever, chills, headache, vision changes, chest pain, palpitations, sob, nausea, vomiting, constipation, diarrhea, muscle weakness or stiffness, numbness or tingling.    Past Psychiatric History:   Past Psychiatric Diagnoses: Denies  Previous Psychiatric Medication Trials: Lexapro (mild sedation)  Past Seen Psychiatrists: Denies  Past Psychiatric Hospitalizations: Denies  Suicide/Homicide History: Denies    Family Psychiatric History: Denies     Past Medical History:   PMHx Diagnoses: Gout- stable  Medications:   Current Outpatient Medications   Medication Sig Dispense Refill   • venlafaxine XR (EFFEXOR XR) 75 MG 24 hr capsule TAKE 1 CAPSULE BY MOUTH DAILY 30 capsule 2   • omeprazole (PriLOSEC) 40 MG capsule Take 1 capsule by mouth daily. Take 30 min before first meal of the day 30 capsule 11     No current facility-administered medications for this visit.       Allergies:   Denies        Mental Status Exam:   Appearance: 41 year old male appears stated age, fair grooming/hygiene, wearing casual clothes, no  acute distress  Behavior: Pleasant, calm, engaged, cooperative with interview. Fair eye contact.  Speech: RRR, normal volume, normal tone.  Spontaneous speech without stuttering/stammering.  Motor: No PMR or PMA noted.  No tics/tremors or other abnormal motor movements visible.  Mood: \"fine\"  Affect: Euthymic  Thought Process: Linear, logical, goal directed in conversation towards treatment  Thought Content: Patient denies SI/HI, paranoia, or delusions.  Perceptions: Denies AVH, does not appear to be responding to internal stimuli  Insight: Fair, patient recognizes worsening mood symptoms, articulates sources of stress, understands need for treatment, connects psych symptoms to situation  Judgment: Fair, patient acting on need for treatment, brought self in for psychiatric care. Consistent with follow-up, meds.  Cognition: A and Ox3, fair concentration, with intact short and long term memory     Assessment:   Pt is a 41 yo male with no formal psychiatric history presenting for psychiatric evaluation. Biologically, patient has never trialed any psychiatric medications or attended therapy. Psychosocially, pt endorses numerous stressors at this time including marital issues, his grandfather passing and a nephew with a cancer diagnosis. Denies any suicidal thinking. Pt is open to both medication management and therapy.      Diagnosis:   MDD, in partial remission   CHACE      Plan:     Depression and anxiety symptoms:    - Increase Effexor XR 150mg daily. Discussed burdens/benefits/expectations of treatment with pt, along with the option of no treatment vs. alternative treatment(s). Specifically discussed, though discussion not limited to, risk of activation, sedation, weight changes, discontinuation sxs.   - Discussed getting BP reading. States he has a cuff at home and will report back.     - Continue therapy sessions with Dr. Brandon    - Discussed with patient to call or message for any concerns or questions, including  side effects or changes in behavior. Pt informed to contact family members, psychiatrist, dial 911, or seek emergent psychiatric evaluation in the occurrence of new or worsening symptoms including safety concerns such as danger to self, others, or inability to self-care. Discussed assessment and plan with patient. Pt. expressed understanding of risks, benefits, alternatives of treatment and agreed.     Psychotherapy Techniques Employed: Supportive listening, empathic validation, alliance building to which patient responded well.    - Follow up in 1 month for 30 minutes    Time spent in therapy: 14 minutes  Total encounter time spent today (including face to face time, time spent preparing to see the patient, ordering medications, tests, or procedures, referring/communicating with other health care professionals when not separately reported, documenting clinical information in the health record, independently interpreting results, and communicating results to the patient/family/caregiver): 30 minutes      Additional time not otherwise dedicated to therapy was spent on E & M services which are based on Medical Complexity .        Helga Mahmood, DO  Psychiatry, PGY4    This communication was assisted with Dragon assisted speech recognition. If there are any typographical errors or ambiguities in the dictation please contact the provider for further information or correction.     There are no Wet Read(s) to document.

## 2022-12-11 NOTE — HISTORY OF PRESENT ILLNESS
[FreeTextEntry8] : 43 yr old presents today main complaint is cough. Admits to being manic at this time and very talkative, reports her recent medicine triggered a manic episode according to her.  Provider needs to keep re- directing conversation. Reports Cold/ Cough symptoms approx 3 weeks in duration, cough same day and night, slightly productive, mild Nasal congestion, ears feel clogged.  Alert and oriented, no focal deficits, no motor or sensory deficits.

## 2023-01-17 NOTE — ED BEHAVIORAL HEALTH ASSESSMENT NOTE - DOMICILED WITH
[FreeTextEntry1] : Pt with above medical issues which requires extra work in addition to the well visit.\par Labs reviewed.\par Meds adjusted\par To watch salt in diet.\par Health counseling given.\par FU 6 months\par Discussed Sh[facility] and to get next Covid booster Other

## 2023-03-08 NOTE — ED PROVIDER NOTE - RESPIRATORY, MLM
Is This A New Presentation, Or A Follow-Up?: Skin Lesion What Type Of Note Output Would You Prefer (Optional)?: Bullet Format How Severe Is Your Skin Lesion?: moderate Has Your Skin Lesion Been Treated?: not been treated Which Family Member (Optional)?: Father Is This A New Presentation, Or A Follow-Up?: Skin Lesions Additional History: Patient is mildly flaring today, he was given clindamycin lotion amd uses it prn Breath sounds clear and equal bilaterally.

## 2023-04-10 NOTE — ED BEHAVIORAL HEALTH ASSESSMENT NOTE - REMOTE MEMORY
Normal Referred To Plastics For Closure Text (Leave Blank If You Do Not Want): After obtaining clear surgical margins the patient was sent to plastics for surgical repair.  The patient understands they will receive post-surgical care and follow-up from Dr. Gallagher.

## 2023-08-24 NOTE — ED BEHAVIORAL HEALTH ASSESSMENT NOTE - ESTIMATED INTELLIGENCE
Problem: Knowledge Deficit  Goal: Patient/family/caregiver demonstrates understanding of disease process, treatment plan, medications, and discharge instructions  Description: Complete learning assessment and assess knowledge base.   Interventions:  - Provide teaching at level of understanding  - Provide teaching via preferred learning methods  Outcome: Progressing
Average

## 2023-10-23 NOTE — BH INPATIENT PSYCHIATRY ASSESSMENT NOTE - NSTXDEPRESINTERMD_PSY_ALL_CORE
Patient LVM inquiring about scheduling a new patient appointment. Writer LVM with office number for patient to call back. medication management

## 2023-11-15 NOTE — ED PROVIDER NOTE - NSTIMEPROVIDERCAREINITIATE_GEN_ER
Pt's head wound cleansed with chlorhexidine solution and sterile water. Laceration to the back of head.       Netta Carlton RN  11/15/23 [id number] 26-Sep-2017 19:17

## 2024-01-01 NOTE — ED BEHAVIORAL HEALTH ASSESSMENT NOTE - ADULT OR CHILD PROTECTIVE SERVICES INVOLVEMENT
Chief complaint:   Chief Complaint   Patient presents with    Cough       Vitals:  Visit Vitals  Pulse 112   Temp 98.1 °F (36.7 °C) (Temporal)   Resp 32   Wt 8.775 kg (19 lb 5.5 oz)   SpO2 99%       HISTORY OF PRESENT ILLNESS     He has had a cough for the past month. It initially occurred when he was doing tummy time. After a week, it became more frequent. Mom tried zarabees. That did not help. He used zyrtec, which may have helped a little.     6 days ago, he started having nasal congestion. Yesterday morning he had a temp 101. He has been taking tylenol and ibuprofen, last dose ibuprofen 2 hours ago. Tmax 100 today.     He has been eating well. He woke up around 3 am today fussing.     No family history of asthma    Mom and brother have coughs.     He does go to .         Other significant problems:  Patient Active Problem List    Diagnosis Date Noted    Stenosis of right nasolacrimal duct 2024     Priority: Low       PAST MEDICAL, FAMILY AND SOCIAL HISTORY     Medications:  Current Outpatient Medications   Medication Sig Dispense Refill    cholecalciferol (VITAMIN D) 10 mcg (400 units)/mL oral liquid Take 2.5 mLs by mouth daily.       No current facility-administered medications for this visit.       Allergies:  ALLERGIES:  No Known Allergies    Past Medical  History/Surgeries:  History reviewed. No pertinent past medical history.    History reviewed. No pertinent surgical history.    Family History:  History reviewed. No pertinent family history.    Social History:  Social History     Tobacco Use    Smoking status: Not on file    Smokeless tobacco: Not on file   Substance Use Topics    Alcohol use: Not on file       REVIEW OF SYSTEMS     Review of Systems   Constitutional:  Positive for fever. Negative for activity change, appetite change, crying and irritability.   HENT:  Positive for congestion. Negative for rhinorrhea.         No pulling on ears   Respiratory:  Positive for cough.     Gastrointestinal:  Negative for diarrhea and vomiting.   Genitourinary:  Negative for decreased urine volume.   Skin:  Negative for rash.       PHYSICAL EXAM     Physical Exam  Constitutional:       General: He is active and smiling. He is not in acute distress.     Appearance: He is well-developed. He is not toxic-appearing.   HENT:      Head: Anterior fontanelle is flat.      Right Ear: Tympanic membrane and external ear normal. No drainage or tenderness.      Left Ear: Tympanic membrane and external ear normal. No drainage or tenderness.      Nose: No nasal deformity.      Mouth/Throat:      Mouth: Mucous membranes are moist. No oral lesions.      Pharynx: Oropharynx is clear. No pharyngeal vesicles or oropharyngeal exudate.      Tonsils: No tonsillar exudate.      Neck: Normal range of motion.   Eyes:      General: Lids are normal.      Conjunctiva/sclera: Conjunctivae normal.   Cardiovascular:      Rate and Rhythm: Normal rate and regular rhythm.      Pulses: Pulses are strong.      Heart sounds: S1 normal and S2 normal. No murmur heard.     No friction rub. No gallop. No S3 or S4 sounds.   Pulmonary:      Effort: Accessory muscle usage present. No nasal flaring.      Breath sounds: No stridor or decreased air movement. Wheezing present.      Comments: Mild subcostal retractions. Patient comfortable, smiling, playful.   Bilateral wheezing throughout lung fields.   Chest:      Chest wall: No deformity.   Abdominal:      General: There is no distension.      Palpations: Abdomen is soft. There is no mass.      Tenderness: There is no abdominal tenderness.   Musculoskeletal:         General: Normal range of motion.   Skin:     General: Skin is warm and moist.      Turgor: Normal.      Findings: No rash.   Neurological:      Mental Status: He is alert.         ASSESSMENT/PLAN     1. Acute cough  Flu/covid/rsv neg. Has wheezing as well. Likely all viral. Continue supportive care. Follow up if he has worsening  cough, difficulty breathing, signs of dehydration.     Supportive measures were discussed, including oral fluid intake (including Pedialyte as needed), humidifier use, nasal saline and suctioning.. Parent to follow-up if cough persists longer than 2-3 weeks, patient has shortness of breath, difficulty breathing, signs of dehydration, or if parent has any further concerns.     Offered beyfortus. Mom will talk to dad, declined for now.   - POCT SARS-COV-2 Antigen/Flu Antigen Panel via [facility]  - POCT RSV RAPID, IN-OFFICE     No

## 2024-01-22 NOTE — ED ADULT NURSE NOTE - NSFALLRSKOUTCOME_ED_ALL_ED
Hypertension is uncontrolled.   High today on repeat and on home measurements.  Add amlodipine 5 mg daily, continue losartan-hctz.   RTC in 1 month for blood pressure check.    Universal Safety Interventions

## 2024-02-28 NOTE — BH SOCIAL WORK INITIAL PSYCHOSOCIAL EVALUATION - NSBHMILITARYHX_PSY_ALL_CORE
Addended by: KAMLA GRAY on: 2/28/2024 09:50 AM     Modules accepted: Orders    
Addended by: KAMLA GRAY on: 2/28/2024 09:50 AM     Modules accepted: Orders    
None

## 2024-02-29 NOTE — ED ADULT TRIAGE NOTE - NS ED TRIAGE AVPU SCALE
Spoke with pt in regards to appt I scheduled with RR on tomorrow Friday 3/1/2024 pt stated swelling on happens after PT and he does not feel like appt is needed. Also stated to pt he has a f/u appt scheduled with RR 3/14/2024 can he wait til then pt stated yes. Explained that if needed sooner appt please contact. Pt verbalize understands.    Alert-The patient is alert, awake and responds to voice. The patient is oriented to time, place, and person. The triage nurse is able to obtain subjective information.

## 2024-04-12 NOTE — ED PROVIDER NOTE - NS ED ATTENDING STATEMENT MOD
Diagnosis:   Chronic midline low back pain without sciatica (M54.50,G89.29)      Referring Provider: Kary  Date of Evaluation:    2/2/2024    Precautions:  None Next MD visit:   none scheduled  Date of Surgery: n/a   Insurance Primary/Secondary: [employer] [place] / N/A     # Auth Visits: no visit limit, no auth           Progress Summary  Pt has attended 2 visits in Physical Therapy.    Subjective: Pt reports now in the last few weeks her back hasn't been bothering her as much. Currently able to be more HEP compliant but was more limited originally due to some personal issues/ family emergency affecting ability to come in to PT. Is now able to bend to don socks. Pt is still dealing with family/ personal issues. Has not been wearing SI belt & seems to be okay without it. Biggest concern is doing something to aggravate the back to go back to where it was. Current functional limitations include self-limited ADLs 2/2 fear of pain.  Pain: current 0/10. At worst with cleaning & working 4-5/10      Objective: See Flowsheet for details  4/12/2024  Observation/Posture: increased lordosis upper lumbar/ lower thoracic; B pes planus & genu valgum    Lumbar AROM: (* denotes performed with pain)  Flexion: Min restricted  Extension: Mod restricted  Sidebending: R Mod restricted; L Mod restricted  Rotation: R Min restricted; L Min restricted    Hip PROM:  Hip IR: Min restricted B  Hip ER: WNL B    Strength: (* denotes performed with pain)  LE   Hip Abduction: R 3/5; L 3+/5  Hip Extension: R 3/5; L 3/5      Flexibility:   LE   Hamstrings: R Min restricted; L Min restricted  Piriformis: R Mod restricted; L Mod restricted  Quads: R Min restricted; L Min restricted     Special tests:   Pelvic alignment: pelvic symmetry    Gait: pt ambulates on level ground with decreased trunk rotation, mild lateral trunk lean R  Balance: SLS R 10 sec, L 15 sec with hip/ pelvic drop with both. Increased challenge on RLE - more trunk sway & use of UE  movement in attempts to remain balanced      Assessment: Pt returns to PT for second visit, after being unable to attend after IE due to family emergency. Re-assessment performed to allow best practice for moving forward with additional treatment. Upon discussion with pt, will plan for 1 more visit tentatively for now, with possible additional visits pending pt update at subsequent visit. HEP updated for pt to emphasize next week during time away from pt. If pt returns to next visit feeling continued subjective improvements & that she is able to confidently continue with HEP, pt may be discharged from POC at this time. If pt feels she is still having symptoms despite HEP adherence may plan to continue several more visits. Thank you.       Goals: (to be met in 6 visits)  Pt will demonstrate good understanding of proper posture & body mechanics to decrease pain & improve spinal safety for ability to resume ADLs without self-limitations or fear of pain  Pt will improve lumbar spine AROM flexion to WNL to allow increase ease with bending forward to don socks  Pt will improve B hip abduction & extension strength to at least 4-/5 for improved tolerance to standing & bending for work duties  Pt will be independent and compliant with comprehensive HEP to maintain progress achieved in PT    Plan: Continue skilled Physical Therapy 1-2 x/week or a total of 3-4 visits over a 90 day period. Treatment will include:  Manual Therapy, Neuromuscular Re-education, Self-Care Home Management, Therapeutic Activities, Therapeutic Exercise, Home Exercise Program instruction, and Modalities to include: Electrical stimulation (unattended)   Patient/Family/Caregiver was advised of these findings, precautions, and treatment options and has agreed to actively participate in planning and for this course of care.    Thank you for your referral. If you have any questions, please contact me at Dept: [phone].    Sincerely,  Electronically  signed by therapist: Monique Lin PT     Physician's certification required:  Yes  Please co-sign or sign and return this letter via fax as soon as possible to [phone].   I certify the need for these services furnished under this plan of treatment and while under my care.    X___________________________________________________ Date____________________    Certification From: 4/12/2024  To:7/11/2024     Date: 4/12/2024  TX#: 2/6  Progress Note Date:                 TX#: 3/ Date:                 TX#: 4/ Date:                 TX#: 5/ Date:   Tx#: 6/   Therex: 45'  Re-assessment  Discussion of POC/ discharge planning  HEP review/ update  Hip Abd Vanessa 10x10\" Blue TB  Active HS stretch 3x10\" ea  Bridges x 10  Clams x 10 ea  Reverse clams x 10 ea  S/L hip Abd x 10 ea  LTR 5\" 2x1'  HEP update ^       HEP:  Access Code: [email or web address]  URL: [email or web address]  Date: 02/02/2024  Prepared by: Monique Lin    Exercises  - Hooklying Isometric Hip Abduction with Belt  - 1 x daily - 3-4 x weekly - 1-2 sets - 10 reps - 10 sec hold (Green TheraBand)  - Supine Piriformis Stretch with Foot on Ground  - 1-2 x daily - 7 x weekly - 3 sets - 30 sec hold  - Seated Piriformis Stretch  - 1-2 x daily - 7 x weekly - 3 sets - 30 sec hold      Access Code: [email or web address]  URL: [email or web address]  Date: 04/12/2024  Prepared by: Monique Lin    Exercises  - Hooklying Active Hamstring Stretch  - 1 x daily - 7 x weekly - 1 sets - 5-10 reps - 10 sec hold  - Supine Bridge  - 1 x daily - 7 x weekly - 1-3 sets - 10 reps - 1-2 sec hold  - Clamshell  - 1 x daily - 7 x weekly - 1-3 sets - 10 reps - 1-2 sec hold  - Sidelying Reverse Clamshell  - 1 x daily - 7 x weekly - 1-3 sets - 10 reps - 1-2 sec hold  - Sidelying Hip Abduction  - 1 x daily - 7 x weekly - 1-3 sets - 10 reps - 1-2 sec hold  - Supine Lower Trunk Rotation  - 1 x daily - 7 x weekly - 5 sec hold - 1-3 minutes    Charges: Therex x 3       Total Timed Treatment: 45 min  Total  Treatment Time: 45 min   Attending with

## 2024-04-25 ENCOUNTER — INPATIENT (INPATIENT)
Facility: HOSPITAL | Age: 49
LOS: 19 days | Discharge: HOME | End: 2024-05-15
Attending: PSYCHIATRY & NEUROLOGY | Admitting: PSYCHIATRY & NEUROLOGY
Payer: MEDICARE

## 2024-04-25 VITALS
TEMPERATURE: 98 F | DIASTOLIC BLOOD PRESSURE: 89 MMHG | RESPIRATION RATE: 16 BRPM | WEIGHT: 164.91 LBS | OXYGEN SATURATION: 97 % | HEART RATE: 106 BPM | SYSTOLIC BLOOD PRESSURE: 145 MMHG

## 2024-04-25 DIAGNOSIS — F31.9 BIPOLAR DISORDER, UNSPECIFIED: ICD-10-CM

## 2024-04-25 LAB
ALBUMIN SERPL ELPH-MCNC: 4.6 G/DL — SIGNIFICANT CHANGE UP (ref 3.3–5)
ALP SERPL-CCNC: 133 U/L — HIGH (ref 40–120)
ALT FLD-CCNC: 8 U/L — SIGNIFICANT CHANGE UP (ref 4–33)
AMPHET UR-MCNC: NEGATIVE — SIGNIFICANT CHANGE UP
ANION GAP SERPL CALC-SCNC: 10 MMOL/L — SIGNIFICANT CHANGE UP (ref 7–14)
APAP SERPL-MCNC: <10 UG/ML — LOW (ref 15–25)
APPEARANCE UR: CLEAR — SIGNIFICANT CHANGE UP
AST SERPL-CCNC: 12 U/L — SIGNIFICANT CHANGE UP (ref 4–32)
BARBITURATES UR SCN-MCNC: NEGATIVE — SIGNIFICANT CHANGE UP
BASOPHILS # BLD AUTO: 0.02 K/UL — SIGNIFICANT CHANGE UP (ref 0–0.2)
BASOPHILS NFR BLD AUTO: 0.2 % — SIGNIFICANT CHANGE UP (ref 0–2)
BENZODIAZ UR-MCNC: NEGATIVE — SIGNIFICANT CHANGE UP
BILIRUB SERPL-MCNC: 0.3 MG/DL — SIGNIFICANT CHANGE UP (ref 0.2–1.2)
BILIRUB UR-MCNC: NEGATIVE — SIGNIFICANT CHANGE UP
BUN SERPL-MCNC: 12 MG/DL — SIGNIFICANT CHANGE UP (ref 7–23)
CALCIUM SERPL-MCNC: 10.7 MG/DL — HIGH (ref 8.4–10.5)
CHLORIDE SERPL-SCNC: 103 MMOL/L — SIGNIFICANT CHANGE UP (ref 98–107)
CO2 SERPL-SCNC: 24 MMOL/L — SIGNIFICANT CHANGE UP (ref 22–31)
COCAINE METAB.OTHER UR-MCNC: NEGATIVE — SIGNIFICANT CHANGE UP
COLOR SPEC: YELLOW — SIGNIFICANT CHANGE UP
CREAT SERPL-MCNC: 0.82 MG/DL — SIGNIFICANT CHANGE UP (ref 0.5–1.3)
CREATININE URINE RESULT, DAU: 82 MG/DL — SIGNIFICANT CHANGE UP
DIFF PNL FLD: NEGATIVE — SIGNIFICANT CHANGE UP
EGFR: 88 ML/MIN/1.73M2 — SIGNIFICANT CHANGE UP
EOSINOPHIL # BLD AUTO: 0 K/UL — SIGNIFICANT CHANGE UP (ref 0–0.5)
EOSINOPHIL NFR BLD AUTO: 0 % — SIGNIFICANT CHANGE UP (ref 0–6)
ETHANOL SERPL-MCNC: <10 MG/DL — SIGNIFICANT CHANGE UP
GLUCOSE SERPL-MCNC: 119 MG/DL — HIGH (ref 70–99)
GLUCOSE UR QL: NEGATIVE MG/DL — SIGNIFICANT CHANGE UP
HCG SERPL-ACNC: <1 MIU/ML — SIGNIFICANT CHANGE UP
HCT VFR BLD CALC: 42.8 % — SIGNIFICANT CHANGE UP (ref 34.5–45)
HGB BLD-MCNC: 14.4 G/DL — SIGNIFICANT CHANGE UP (ref 11.5–15.5)
IANC: 7.91 K/UL — HIGH (ref 1.8–7.4)
IMM GRANULOCYTES NFR BLD AUTO: 0.3 % — SIGNIFICANT CHANGE UP (ref 0–0.9)
KETONES UR-MCNC: 15 MG/DL
LEUKOCYTE ESTERASE UR-ACNC: NEGATIVE — SIGNIFICANT CHANGE UP
LYMPHOCYTES # BLD AUTO: 1.91 K/UL — SIGNIFICANT CHANGE UP (ref 1–3.3)
LYMPHOCYTES # BLD AUTO: 18 % — SIGNIFICANT CHANGE UP (ref 13–44)
MCHC RBC-ENTMCNC: 29.8 PG — SIGNIFICANT CHANGE UP (ref 27–34)
MCHC RBC-ENTMCNC: 33.6 GM/DL — SIGNIFICANT CHANGE UP (ref 32–36)
MCV RBC AUTO: 88.4 FL — SIGNIFICANT CHANGE UP (ref 80–100)
METHADONE UR-MCNC: NEGATIVE — SIGNIFICANT CHANGE UP
MONOCYTES # BLD AUTO: 0.76 K/UL — SIGNIFICANT CHANGE UP (ref 0–0.9)
MONOCYTES NFR BLD AUTO: 7.1 % — SIGNIFICANT CHANGE UP (ref 2–14)
NEUTROPHILS # BLD AUTO: 7.91 K/UL — HIGH (ref 1.8–7.4)
NEUTROPHILS NFR BLD AUTO: 74.4 % — SIGNIFICANT CHANGE UP (ref 43–77)
NITRITE UR-MCNC: NEGATIVE — SIGNIFICANT CHANGE UP
NRBC # BLD: 0 /100 WBCS — SIGNIFICANT CHANGE UP (ref 0–0)
NRBC # FLD: 0 K/UL — SIGNIFICANT CHANGE UP (ref 0–0)
OPIATES UR-MCNC: NEGATIVE — SIGNIFICANT CHANGE UP
OXYCODONE UR-MCNC: NEGATIVE — SIGNIFICANT CHANGE UP
PCP SPEC-MCNC: SIGNIFICANT CHANGE UP
PCP UR-MCNC: NEGATIVE — SIGNIFICANT CHANGE UP
PH UR: 6.5 — SIGNIFICANT CHANGE UP (ref 5–8)
PLATELET # BLD AUTO: 387 K/UL — SIGNIFICANT CHANGE UP (ref 150–400)
POTASSIUM SERPL-MCNC: 4.9 MMOL/L — SIGNIFICANT CHANGE UP (ref 3.5–5.3)
POTASSIUM SERPL-SCNC: 4.9 MMOL/L — SIGNIFICANT CHANGE UP (ref 3.5–5.3)
PROT SERPL-MCNC: 7.6 G/DL — SIGNIFICANT CHANGE UP (ref 6–8.3)
PROT UR-MCNC: NEGATIVE MG/DL — SIGNIFICANT CHANGE UP
RBC # BLD: 4.84 M/UL — SIGNIFICANT CHANGE UP (ref 3.8–5.2)
RBC # FLD: 12.6 % — SIGNIFICANT CHANGE UP (ref 10.3–14.5)
SALICYLATES SERPL-MCNC: <0.3 MG/DL — LOW (ref 15–30)
SARS-COV-2 RNA SPEC QL NAA+PROBE: SIGNIFICANT CHANGE UP
SODIUM SERPL-SCNC: 137 MMOL/L — SIGNIFICANT CHANGE UP (ref 135–145)
SP GR SPEC: 1.01 — SIGNIFICANT CHANGE UP (ref 1–1.03)
THC UR QL: NEGATIVE — SIGNIFICANT CHANGE UP
TOXICOLOGY SCREEN, DRUGS OF ABUSE, SERUM RESULT: SIGNIFICANT CHANGE UP
TSH SERPL-MCNC: 0.57 UIU/ML — SIGNIFICANT CHANGE UP (ref 0.27–4.2)
UROBILINOGEN FLD QL: 0.2 MG/DL — SIGNIFICANT CHANGE UP (ref 0.2–1)
WBC # BLD: 10.63 K/UL — HIGH (ref 3.8–10.5)
WBC # FLD AUTO: 10.63 K/UL — HIGH (ref 3.8–10.5)

## 2024-04-25 PROCEDURE — 99285 EMERGENCY DEPT VISIT HI MDM: CPT

## 2024-04-25 RX ORDER — HALOPERIDOL DECANOATE 100 MG/ML
5 INJECTION INTRAMUSCULAR ONCE
Refills: 0 | Status: DISCONTINUED | OUTPATIENT
Start: 2024-04-25 | End: 2024-04-26

## 2024-04-25 RX ORDER — DIPHENHYDRAMINE HCL 50 MG
50 CAPSULE ORAL EVERY 4 HOURS
Refills: 0 | Status: DISCONTINUED | OUTPATIENT
Start: 2024-04-25 | End: 2024-05-15

## 2024-04-25 RX ORDER — HALOPERIDOL DECANOATE 100 MG/ML
5 INJECTION INTRAMUSCULAR EVERY 4 HOURS
Refills: 0 | Status: DISCONTINUED | OUTPATIENT
Start: 2024-04-25 | End: 2024-04-26

## 2024-04-25 RX ORDER — LAMOTRIGINE 25 MG/1
150 TABLET, ORALLY DISINTEGRATING ORAL
Refills: 0 | Status: ACTIVE | OUTPATIENT
Start: 2024-04-25 | End: 2025-03-24

## 2024-04-25 RX ORDER — PANTOPRAZOLE SODIUM 20 MG/1
40 TABLET, DELAYED RELEASE ORAL
Refills: 0 | Status: DISCONTINUED | OUTPATIENT
Start: 2024-04-25 | End: 2024-05-15

## 2024-04-25 RX ORDER — DIPHENHYDRAMINE HCL 50 MG
50 CAPSULE ORAL ONCE
Refills: 0 | Status: DISCONTINUED | OUTPATIENT
Start: 2024-04-25 | End: 2024-05-15

## 2024-04-25 RX ORDER — TRAZODONE HCL 50 MG
50 TABLET ORAL ONCE
Refills: 0 | Status: COMPLETED | OUTPATIENT
Start: 2024-04-25 | End: 2024-04-25

## 2024-04-25 RX ADMIN — Medication 50 MILLIGRAM(S): at 22:56

## 2024-04-25 RX ADMIN — LAMOTRIGINE 150 MILLIGRAM(S): 25 TABLET, ORALLY DISINTEGRATING ORAL at 21:18

## 2024-04-25 NOTE — ED BEHAVIORAL HEALTH ASSESSMENT NOTE - ADDITIONAL DETAILS ALL
patient refused to discuss today, but per records documented past hx of Attempt vs gesture via cutting neck superficially using a glass in 2014

## 2024-04-25 NOTE — ED BEHAVIORAL HEALTH ASSESSMENT NOTE - SUMMARY
Patient is a 48 yr old woman, single, domiciled and unemployed, with past hx of bipolar disorder, PTSD, borderline personality disorder and Psychogenic Seizures, per PSYCKES Borderline Personality Disorder Unspecified/Other Depressive Disorder Major Depressive Disorder Bipolar I Unspecified/Other Anxiety Disorder Unspecified/Other Bipolar Conversion Disorder PTSD Schizoaffective Disorder, with multiple inpatient psychiatric admissions and ED visits, last seen at [facility] at 4/25/2022 ED, including recent hospitalizations 4/28/2004 to 5/28/2004 to [facility] (?) per PSYCKES, 6/20/2021 to 6/25/2021 to [facility] for acute judson, 6/9/2023 to 6/23/2023 to [facility], 4/4/2022 to [facility] due to worsening anxiety and did not feel her pain was being properly managed on the unit, demanding discharge and provided 3DL. Pt became agitated and accused staff of keeping her a prisoner.  Pt later on submitted a 3DL letter requesting release on 4/5/22. Per records hx of suicide attempt vs gesture - superficially cut her neck with glass (which did not require suturing) in 2014. there is no hx of illicit substance.  Pertinent medical issues: endometriosis, chronic pains and ? seizure disorder.  Today, presented to the hospital with worsening depressed mood.      Patient endorsing depressed and anxious moods, hopelessness and suicidal ideations.  Patient unable to engage in meaningful safety planning.  Patient is an acute danger to self and others at this time.  Patient lacks insight and judgment into illness and remains an acute safety risk and warrants inpatient psychiatric hospitalization for safety and stabilization.

## 2024-04-25 NOTE — ED BEHAVIORAL HEALTH ASSESSMENT NOTE - HPI (INCLUDE ILLNESS QUALITY, SEVERITY, DURATION, TIMING, CONTEXT, MODIFYING FACTORS, ASSOCIATED SIGNS AND SYMPTOMS)
Patient is a 48 yr old woman, single, domiciled and unemployed, with past hx of bipolar disorder, PTSD, borderline personality disorder and Psychogenic Seizures, per PSYCKES Borderline Personality Disorder Unspecified/Other Depressive Disorder Major Depressive Disorder Bipolar I Unspecified/Other Anxiety Disorder Unspecified/Other Bipolar Conversion Disorder PTSD Schizoaffective Disorder, with multiple inpatient psychiatric admissions and ED visits, last seen at [facility] at 4/25/2022 ED, including recent hospitalizations 4/28/2004 to 5/28/2004 to [facility] [place] (?) per PSYCKES, 6/20/2021 to 6/25/2021 to [facility] for acute judson, 6/9/2023 to 6/23/2023 to [facility], 4/4/2022 to [facility] due to worsening anxiety and did not feel her pain was being properly managed on the unit, demanding discharge and provided 3DL. Pt became agitated and accused staff of keeping her a prisoner.  Pt later on submitted a 3DL letter requesting release on 4/5/22. Per records hx of suicide attempt vs gesture - superficially cut her neck with glass (which did not require suturing) in 2014. there is no hx of illicit substance.  Pertinent medical issues: endometriosis, chronic pains and ? seizure disorder.  Today, presented to the hospital with worsening depressed mood.      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 Reports Zyprexa for anxiety and then states that she has a history of Bipolar disorder.      Patient remains tearful.  Patient reports depressive symptoms including depressed mood, anhedonia, changes in energy/concentration/appetite, sleep disturbances. Patient endorses not sleeping and overeating with weight gain due to medication resulting in hopelessness and poor self image.  Patient states that she does not feel like she is depressed but admits to "contemplating suicide today."  Was not able to state what prevented her.  When asked about access to means, she laughed and said "well .. yeah .. kitchen knives."  States that she was previously diagnosed with Bipolar and stated that "she was definitely manic."  Patient reported manic symptoms including elevated mood, mood lability, grandiosity, pressured speech, increase in goal-directed activity, and decreased need for sleep. Patient reports vague symptoms of anxiety. Patient denies any psychotic symptoms including auditory/visual hallucinations. Patient states that she does not want to kill herself.  Denies homicidal ideations, intent or plans.     Patient able to state that she binge watches stupid shows and will half be on the computer and be doing puzzles and sudoko or another puzzle and gets frustrated.  Patient requesting voluntary admission.

## 2024-04-25 NOTE — ED BEHAVIORAL HEALTH ASSESSMENT NOTE - RISK ASSESSMENT
Protective factors include no violence history, poor compliance to medication, no recent SAs, supportive housing, no access to guns, no substance abuse, willingness to seek help, no homicidal ideation, help seeking  Chronic elevated background risk given risk factors include history of suicidal thoughts in the past/suicidal gestures, multiple hospitalizations,  limited social supports and BPD

## 2024-04-25 NOTE — ED PROVIDER NOTE - OBJECTIVE STATEMENT
48-year-old female with past medical history of bipolar disorder (on Zyprexa, off for the last 2 weeks), epilepsy (on Lamictal) presents to the ED  with complaint of having suicidal thoughts for the last few weeks.  Patient states her 49th birthday is coming up soon, and she has been feeling really disorganized, having feelings of not wanting to live anymore, potentially thoughts of o self-harm.  She does not have a suicidal plan currently, but she admits to previous suicide attempt in 2014, she does not want to elaborate further.  She mentions her mother Kelsey as an emergency contact, and her therapist Faby Sheehan and she states they do not have her permission to provide collateral information on her behalf.  She states she sees a psychiatrist who is not easily reachable and cannot recall the name.  She denies any somatic complaints at this time concerns.

## 2024-04-25 NOTE — ED PROVIDER NOTE - CLINICAL SUMMARY MEDICAL DECISION MAKING FREE TEXT BOX
48-year-old female with past medical history of bipolar disorder (on Zyprexa, off for the last 2 weeks), epilepsy (on Lamictal) presents to the ED  with complaint of having suicidal thoughts for the last few weeks. vital signs reviewed, mildly tachycardic with a heart rate of 106.  Patient is otherwise well-appearing, in no acute respiratory distress, ambulatory in  with steady gait without assistance.  Fully cooperative and not agitated.  Impression: suicidal thoughts without a plan in a patient with history of bipolar and depression disorder and previous suicide attempt.  Plan for labs and EKG for medical clearance, psychiatric consultation.

## 2024-04-25 NOTE — ED ADULT NURSE NOTE - CHIEF COMPLAINT QUOTE
c/o S/I without plan. Patient tearful in triage. States " I just got a lot going on" Denies H/I, visual or auditory disturbances

## 2024-04-25 NOTE — ED BEHAVIORAL HEALTH ASSESSMENT NOTE - PAST PSYCHOTROPIC MEDICATION
prazoson 1mg HS, Klonopin 0.25mg q6Hrs PRN  PRN, motrin 800mg q12Hrs PRN, tylenol 650mg q6Hrs PRN, lidocaine patch daily (12 hours on/12 hours   Trazodone, Hydroxyzine, Effexor XR, Prozac, Lithium, Depakote, zoloft, Risperdal, Seroquel 25 - 50mg HS PRN   Lamictal 150mgBID and Klonopin 0.25mg PRN q6Hrs prazosin 1mg HS, Klonopin 0.25mg q6Hrs PRN  PRN, motrin 800mg q12Hrs PRN, tylenol 650mg q6Hrs PRN, lidocaine patch daily (12 hours on/12 hours   Trazodone, Hydroxyzine, Effexor XR, Prozac, Lithium, Depakote, zoloft, Risperdal, Seroquel 25 - 50mg HS PRN   Lamictal 150mgBID and Klonopin 0.25mg PRN q6Hrs

## 2024-04-25 NOTE — ED ADULT NURSE NOTE - SUICIDE SCREENING QUESTION 3A
Order placed.    Confirmation #[id number]  
Per Dr. Rose:  Could you please drop ship the [facility] Myco Tox with OATs to this Mom? (Alistair)    
Patient refused

## 2024-04-25 NOTE — ED ADULT NURSE NOTE - OBJECTIVE STATEMENT
Patient arrives to the ED for SI for the past two weeks since stopping her zyprexa. Patient refusing to say she stopped her medications. Patient reports phx of bipolar and previous suicide attempt but would not say when or how. Patient reports she is having suicidal ideation over current stressors but denies any current plan. Patient denies HI/VH/AH.  Patient calm and cooperative during triage but only whispering during interview. Belongings secured. Safety maintained.

## 2024-04-25 NOTE — BH PATIENT PROFILE - HOME MEDICATIONS
pantoprazole 40 mg oral delayed release tablet , 1 tab(s) orally once a day (before a meal)  clonazePAM , 0.25 tab(s) orally every 6 hours as needed  atorvastatin 20 mg oral tablet , 1 tab(s) orally once a day (at bedtime)  lamoTRIgine 150 mg oral tablet , 1 tab(s) orally 2 times a day

## 2024-04-25 NOTE — ED BEHAVIORAL HEALTH ASSESSMENT NOTE - DESCRIPTION
Nonepileptic seizures, GERD and HLD. current meds: Protonix 40mg daily and Lipitor 20mg HS single, domiciled. currently has a BF. unemployed. 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 has a pet cat named Anival. no reported access to guns. Patient was labile, tearful in the ED and did not exhibit any aggression. Patient did not require any PRN medications or any physical restraints.    Vital Signs Last 24 Hrs  T(C): 36.6 (25 Apr 2024 11:41), Max: 36.6 (25 Apr 2024 11:41)  T(F): 97.9 (25 Apr 2024 11:41), Max: 97.9 (25 Apr 2024 11:41)  HR: 106 (25 Apr 2024 11:41) (106 - 106)  BP: 145/89 (25 Apr 2024 11:41) (145/89 - 145/89)  BP(mean): --  RR: 16 (25 Apr 2024 11:41) (16 - 16)  SpO2: 97% (25 Apr 2024 11:41) (97% - 97%)    Parameters below as of 25 Apr 2024 11:41  Patient On (Oxygen Delivery Method): room air Nonepileptic seizures, GERD and HLD. previous meds: Protonix 40mg daily and Lipitor 20mg HS

## 2024-04-25 NOTE — ED BEHAVIORAL HEALTH ASSESSMENT NOTE - OTHER PAST PSYCHIATRIC HISTORY (INCLUDE DETAILS REGARDING ONSET, COURSE OF ILLNESS, INPATIENT/OUTPATIENT TREATMENT)
per JEANIE Borderline Personality Disorder Unspecified/Other Depressive Disorder Major Depressive Disorder Bipolar I Unspecified/Other Anxiety Disorder Unspecified/Other Bipolar Conversion Disorder PTSD Schizoaffective Disorder, with multiple inpatient psychiatric admissions and ED visits, last seen at [facility] at 4/25/2022 ED, including recent hospitalizations 4/28/2004 to 5/28/2004 to [facility] [place] (?) per JEANIE, 6/20/2021 to 6/25/2021 to [facility] for acute judson, 6/9/2023 to 6/23/2023 to [facility], 4/4/2022 to [facility] due to worsening anxiety, per records hx of suicide attempt vs gesture - superficially cut her neck with glass (which did not require suturing) in 2014.

## 2024-04-25 NOTE — ED ADULT TRIAGE NOTE - CHIEF COMPLAINT QUOTE
c/o S/I without plan. Patient tearful in triage. States " I just got a lot going on" Denies H/I, visual or auditory disturbances  aware c/o S/I without plan. Patient tearful in triage. States " I just got a lot going on" Denies H/I, visual or auditory disturbances

## 2024-04-25 NOTE — ED BEHAVIORAL HEALTH ASSESSMENT NOTE - DETAILS
patient refused to discuss today, but per records documented past hx of Attempt vs gesture via cutting neck superficially using a glass in 2014 oxycodone, Sudafed inpatient team aware patient aware of disposition and plans

## 2024-04-26 DIAGNOSIS — F60.3 BORDERLINE PERSONALITY DISORDER: ICD-10-CM

## 2024-04-26 DIAGNOSIS — F31.9 BIPOLAR DISORDER, UNSPECIFIED: ICD-10-CM

## 2024-04-26 DIAGNOSIS — F41.9 ANXIETY DISORDER, UNSPECIFIED: ICD-10-CM

## 2024-04-26 PROCEDURE — 99222 1ST HOSP IP/OBS MODERATE 55: CPT

## 2024-04-26 RX ORDER — TRAZODONE HCL 50 MG
100 TABLET ORAL AT BEDTIME
Refills: 0 | Status: DISCONTINUED | OUTPATIENT
Start: 2024-04-26 | End: 2024-05-15

## 2024-04-26 RX ORDER — OLANZAPINE 15 MG/1
5 TABLET, FILM COATED ORAL EVERY 6 HOURS
Refills: 0 | Status: DISCONTINUED | OUTPATIENT
Start: 2024-04-26 | End: 2024-05-15

## 2024-04-26 RX ORDER — GABAPENTIN 400 MG/1
100 CAPSULE ORAL DAILY
Refills: 0 | Status: DISCONTINUED | OUTPATIENT
Start: 2024-04-27 | End: 2024-05-01

## 2024-04-26 RX ORDER — ACETAMINOPHEN 500 MG
650 TABLET ORAL EVERY 6 HOURS
Refills: 0 | Status: DISCONTINUED | OUTPATIENT
Start: 2024-04-26 | End: 2024-05-15

## 2024-04-26 RX ORDER — LANOLIN ALCOHOL/MO/W.PET/CERES
3 CREAM (GRAM) TOPICAL AT BEDTIME
Refills: 0 | Status: DISCONTINUED | OUTPATIENT
Start: 2024-04-26 | End: 2024-04-26

## 2024-04-26 RX ORDER — MAGNESIUM HYDROXIDE 400 MG/1
30 TABLET, CHEWABLE ORAL DAILY
Refills: 0 | Status: DISCONTINUED | OUTPATIENT
Start: 2024-04-26 | End: 2024-05-15

## 2024-04-26 RX ORDER — LIDOCAINE 4 G/100G
1 CREAM TOPICAL DAILY
Refills: 0 | Status: DISCONTINUED | OUTPATIENT
Start: 2024-04-26 | End: 2024-05-15

## 2024-04-26 RX ORDER — OLANZAPINE 15 MG/1
5 TABLET, FILM COATED ORAL ONCE
Refills: 0 | Status: DISCONTINUED | OUTPATIENT
Start: 2024-04-26 | End: 2024-05-15

## 2024-04-26 RX ORDER — GABAPENTIN 400 MG/1
300 CAPSULE ORAL AT BEDTIME
Refills: 0 | Status: DISCONTINUED | OUTPATIENT
Start: 2024-04-26 | End: 2024-05-06

## 2024-04-26 RX ORDER — DIPHENHYDRAMINE HCL 50 MG
50 CAPSULE ORAL ONCE
Refills: 0 | Status: DISCONTINUED | OUTPATIENT
Start: 2024-04-26 | End: 2024-04-26

## 2024-04-26 RX ADMIN — LAMOTRIGINE 150 MILLIGRAM(S): 25 TABLET, ORALLY DISINTEGRATING ORAL at 18:24

## 2024-04-26 RX ADMIN — PANTOPRAZOLE SODIUM 40 MILLIGRAM(S): 20 TABLET, DELAYED RELEASE ORAL at 08:17

## 2024-04-26 RX ADMIN — GABAPENTIN 300 MILLIGRAM(S): 400 CAPSULE ORAL at 21:27

## 2024-04-26 RX ADMIN — Medication 1 MILLIGRAM(S): at 11:12

## 2024-04-26 RX ADMIN — LAMOTRIGINE 150 MILLIGRAM(S): 25 TABLET, ORALLY DISINTEGRATING ORAL at 08:17

## 2024-04-26 RX ADMIN — Medication 50 MILLIGRAM(S): at 01:03

## 2024-04-26 RX ADMIN — Medication 0.5 MILLIGRAM(S): at 21:40

## 2024-04-26 NOTE — BH INPATIENT PSYCHIATRY ASSESSMENT NOTE - NSICDXBHSECONDARYDX_PSY_ALL_CORE
Bipolar disorder   F31.9  Borderline personality disorder   F60.3  Anxiety   F41.9   Borderline personality disorder   F60.3

## 2024-04-26 NOTE — BH INPATIENT PSYCHIATRY ASSESSMENT NOTE - HPI (INCLUDE ILLNESS QUALITY, SEVERITY, DURATION, TIMING, CONTEXT, MODIFYING FACTORS, ASSOCIATED SIGNS AND SYMPTOMS)
Patient is a 48 yr old woman, single, domiciled and unemployed, with past hx of bipolar disorder, PTSD, borderline personality disorder and Psychogenic Seizures, per PSYCKES Borderline Personality Disorder Unspecified/Other Depressive Disorder Major Depressive Disorder Bipolar I Unspecified/Other Anxiety Disorder Unspecified/Other Bipolar Conversion Disorder PTSD Schizoaffective Disorder, with multiple inpatient psychiatric admissions and ED visits, last seen at [facility] at 4/25/2022 ED, including recent hospitalizations 4/28/2004 to 5/28/[place] to [facility] (?) per PSYCKES, 6/20/2021 to 6/25/2021 to [facility] for acute judson, 6/9/2023 to 6/23/2023 to [facility], 4/4/2022 to [facility] 3 due to worsening anxiety and did not feel her pain was being properly managed on the unit, demanding discharge and provided 3DL. Pt became agitated and accused staff of keeping her a prisoner.  Pt later on submitted a 3DL letter requesting release on 4/5/22. Per records hx of suicide attempt vs gesture - superficially cut her neck with glass (which did not require suturing) in 2014. there is no hx of illicit substance.  Pertinent medical issues: endometriosis, chronic pains and ? seizure disorder.  Today, presented to the hospital with worsening depressed mood.      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 Reports Zyprexa for anxiety and then states that she has a history of Bipolar disorder.      Patient states feeling up since receiving dose of Ativan, "fucking magic", earlier today anxious and down, exhibited labile affect through encounter.  Patient reports depressive symptoms including depressed mood, anhedonia, changes in energy/concentration/appetite, sleep disturbances. Patient endorses not sleeping and overeating with weight gain due to medication resulting in hopelessness and poor self image. Pt suicidal thoughts, on and off for a few months, "My life isn't going to get better. I have to end it." Pt clarifies it is herself and her thoughts, no command hallucinations, never audio hallucinations. Was not able to state what prevented her, no specific plan.  When asked about access to means, she laughed and said "How about kitchen knives? Those can do some damage."  States that she was previously diagnosed with Bipolar and stated that "she was definitely manic." First manic event as an adolescent, first psych hospitalization at [facility] as an adolescent. Patient reported manic symptoms including elevated mood, mood lability, hypersexuality, grandiosity, delusions of reference, pressured speech, increase in goal-directed activity, and decreased need for sleep. Patient reports vague symptoms of anxiety.     Pt expresses belief that she knows she's not going to be ok and that nothing will get better after this hospitalization. States that she's going to ask a friend to help her get a job, because she'd like a job, she doesn't think she needs to be on disability, but is afraid of the loss of security for not having disability. Pt doubts this hospitalization can do anything for her other than, "pump her with pills." Patient denies any psychotic symptoms including auditory/visual hallucinations. Patient states that she does not want to kill herself. Denies homicidal ideations, intent or plans.
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
## 2024-04-26 NOTE — BH INPATIENT PSYCHIATRY ASSESSMENT NOTE - NSPRESENTSXS_PSY_ALL_CORE
somatic delusions/Depressed mood/Anhedonia/Hopelessness or despair/Global insomnia/Severe anxiety, agitation or panic

## 2024-04-26 NOTE — BH INPATIENT PSYCHIATRY ASSESSMENT NOTE - PAST PSYCHOTROPIC MEDICATION
prazosin 1mg HS, Klonopin 0.25mg q6Hrs PRN  Trazodone, Hydroxyzine, Effexor XR, Prozac, Lithium, Depakote, zoloft, Risperdal, Seroquel 25 - 50mg HS PRN  Lamictal 150mgBID and Klonopin 0.25mg PRN q6Hrs    past pain meds: PRN, motrin 800mg q12Hrs PRN, tylenol 650mg q6Hrs PRN, lidocaine patch daily (12 hours on/12 hours off), medical marijuana

## 2024-04-26 NOTE — BH SOCIAL WORK INITIAL PSYCHOSOCIAL EVALUATION - OTHER PAST PSYCHIATRIC HISTORY (INCLUDE DETAILS REGARDING ONSET, COURSE OF ILLNESS, INPATIENT/OUTPATIENT TREATMENT)
Patient is a 48F, single, domiciled and unemployed, non-caregiver, with PPHx bipolar disorder, PTSD, borderline personality disorder and Psychogenic Seizures, multiple prior admissions, last at [facility] 04/04/2022-04/06/2022 due to worsening anxiety, d/c on 3DL s/p did not feel her pain was being properly managed, later became agitated and accused staff of keeping her a prisoner. Per chart, hx of suicide attempt vs gesture in 2014 via superficially cut her neck with glass (which did not require suturing), no hx of illicit substance use, no hx of violence/aggression, no access to firearms, with PMH of endometriosis, chronic pains and ? seizure disorder, who presented to ED with worsening depressed mood and passive SI.     Per PSYCKES report performed in ED, dx include: Borderline Personality Disorder, Unspecified/Other Depressive Disorder, Major Depressive Disorder, Bipolar I, Unspecified/Other Anxiety Disorder, Unspecified/Other Bipolar, Conversion Disorder, PTSD, Schizoaffective Disorder, with multiple inpatient psychiatric admissions and ED visits, last seen at [facility] at 4/25/2022 ED.     Patient seen by Writer at bedside. Patient is calm, cooperative presenting with depressed mood with constricted affect. Writer obtained signed consents for her Mother, Kelsey ([phone]), Psych NP Joelle Hilton ([phone]), Juan Miguel Kidd (care coordinator supervisor for care coordinator Serena Cantor; [phone]). Patient endorses worsening depression due to psychosocial stressors. Patient reports she stopped taking her medication about 2 weeks prior as she felt the meds and her provider were ineffective. Patient reports she is hoping to find the right medication while inpatient. Writer discussed SW role and referral process, as Patient states she has not seen her provider in a month and does not want to continue services with her. Patient agreeable with plan to return home upon discharge.  Writer spoke with care coordinator manager Juan Miguel.

## 2024-04-26 NOTE — BH INPATIENT PSYCHIATRY ASSESSMENT NOTE - NSBHMETABOLIC_PSY_ALL_CORE_FT
BMI: BMI (kg/m2): 25.8 (04-25-24 @ 18:12)  HbA1c:   Glucose:   BP: 145/89 (04-25-24 @ 11:41) (145/89 - 145/89)Vital Signs Last 24 Hrs  T(C): 37 (04-26-24 @ 07:48), Max: 37 (04-26-24 @ 07:48)  T(F): 98.6 (04-26-24 @ 07:48), Max: 98.6 (04-26-24 @ 07:48)  HR: --  BP: --  BP(mean): --  RR: 17 (04-26-24 @ 07:48) (17 - 17)  SpO2: --    Orthostatic VS  04-26-24 @ 07:48  Lying BP: --/-- HR: --  Sitting BP: 118/79 HR: 93  Standing BP: 118/82 HR: 91  Site: --  Mode: --  Orthostatic VS  04-25-24 @ 18:12  Lying BP: --/-- HR: --  Sitting BP: 133/90 HR: 92  Standing BP: 136/87 HR: 98  Site: --  Mode: --    Lipid Panel:

## 2024-04-26 NOTE — BH INPATIENT PSYCHIATRY ASSESSMENT NOTE - NSBHROSSYSTEMS_PSY_ALL_CORE
[employer] G6 supplies required PA through [employer].     Sensors Key [id number]    Transmitter Key [id number]     [place] [id number]    If PA is rejected, may need to initiate order through a [employer] company.       
PA for  approved  
Psychiatric

## 2024-04-26 NOTE — BH INPATIENT PSYCHIATRY ASSESSMENT NOTE - NSBHCHARTREVIEWLAB_PSY_A_CORE FT
14.4   10.63 )-----------( 387      ( 2024 14:40 )             42.8       137  |  103  |  12  ----------------------------<  119<H>  4.9   |  24  |  0.82    Ca    10.7<H>      2024 14:40    TPro  7.6  /  Alb  4.6  /  TBili  0.3  /  DBili  x   /  AST  12  /  ALT  8   /  AlkPhos  133<H>      Urinalysis Basic - ( 2024 14:40 )    Color: Yellow / Appearance: Clear / S.014 / pH: x  Gluc: 119 mg/dL / Ketone: 15 mg/dL  / Bili: Negative / Urobili: 0.2 mg/dL   Blood: x / Protein: Negative mg/dL / Nitrite: Negative   Leuk Esterase: Negative / RBC: x / WBC x   Sq Epi: x / Non Sq Epi: x / Bacteria: x    Utox pan negative  COVID PCR negative

## 2024-04-26 NOTE — BH INPATIENT PSYCHIATRY ASSESSMENT NOTE - DESCRIPTION
single, domiciled. unemployed on disability due to depression, multiple hospitalizations ~ 9 mos. 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 has a pet cat named Anival. no reported access to guns. Graduated high school, Graduated with bachelor's degree in [employer] & Linguistics, initially attended [employer], graduated [employer]. Prior homelessness - pt lived with mother for 3 years as an adult following one psych hospitalization, pt briefly homeless, later lived at an adult care facility for ~5 years.

## 2024-04-26 NOTE — BH INPATIENT PSYCHIATRY ASSESSMENT NOTE - NSBHCHARTREVIEWVS_PSY_A_CORE FT
Vital Signs Last 24 Hrs  T(C): 37 (04-26-24 @ 07:48), Max: 37 (04-26-24 @ 07:48)  T(F): 98.6 (04-26-24 @ 07:48), Max: 98.6 (04-26-24 @ 07:48)  HR: --  BP: --  BP(mean): --  RR: 17 (04-26-24 @ 07:48) (17 - 17)  SpO2: --    Orthostatic VS  04-26-24 @ 07:48  Lying BP: --/-- HR: --  Sitting BP: 118/79 HR: 93  Standing BP: 118/82 HR: 91  Site: --  Mode: --  Orthostatic VS  04-25-24 @ 18:12  Lying BP: --/-- HR: --  Sitting BP: 133/90 HR: 92  Standing BP: 136/87 HR: 98  Site: --  Mode: --

## 2024-04-26 NOTE — BH INPATIENT PSYCHIATRY ASSESSMENT NOTE - NSBHATTESTCOMMENTATTENDFT_PSY_A_CORE
Care was discussed and reviewed in the interdisciplinary treatment team.  I, Malick Valle MD, have reviewed and verified the documentation.  I independently performed the documented medical decision making.

## 2024-04-26 NOTE — BH INPATIENT PSYCHIATRY ASSESSMENT NOTE - NSBHASSESSSUMMFT_PSY_ALL_CORE
Patient is a 48 yr old woman, single, domiciled and unemployed, with past hx of bipolar disorder, PTSD, borderline personality disorder and Psychogenic Seizures, per PSYCKES Borderline Personality Disorder Unspecified/Other Depressive Disorder Major Depressive Disorder Bipolar I Unspecified/Other Anxiety Disorder Unspecified/Other Bipolar Conversion Disorder PTSD Schizoaffective Disorder, with multiple inpatient psychiatric admissions and ED visits, last seen at [facility] at 4/25/2022 ED, including recent hospitalizations 4/28/2004 to 5/28/2004 to [facility] (?) per PSYCKES, 6/20/2021 to 6/25/2021 to [facility] for acute judson, 6/9/2023 to 6/23/2023 to [facility], 4/4/2022 to [facility] due to worsening anxiety and did not feel her pain was being properly managed on the unit, demanding discharge and provided 3DL. Pt became agitated and accused staff of keeping her a prisoner.  Pt later on submitted a 3DL letter requesting release on 4/5/22. Per records hx of suicide attempt vs gesture - superficially cut her neck with glass (which did not require suturing) in 2014. there is no hx of illicit substance.  Pertinent medical issues: endometriosis, chronic pains and ? seizure disorder.  Today, presented to the hospital with worsening depressed mood.      Patient endorsing depressed and anxious moods, hopelessness and suicidal ideations.  Patient unable to engage in meaningful safety planning.  Patient is an acute danger to self and others at this time.  Patient lacks insight and judgment into illness and remains an acute safety risk and warrants inpatient psychiatric hospitalization for safety and stabilization.    4/26: Pt demonstrating labile affect, tangential thoughts, and splitting towards interviewer. Pt turned acutely agitated re: interviewer wearing a mask, referring to her brother, referring to ex-boyfriend, and recalling prior restraint in hospitalization. Endorses she has suicidal thoughts without plan or attempt, acknowledges that she needs help. States she doesn't think she'll ever be okay or that anything will change following her hospitalization. Says inpatient psych only to pump her with pills. AOx4. No HI/hallucinations/delusions.     Plan:   1. Inpatient admission 2W  2. Medications: c/w Lamotrigine 150 mg BID, Trazadone 50 mg qHS, Gabapentin ??  3. PRNs: Haldol 5mg IM/PO, Ativan 1mg IM/PO, Benadryl 50mg IM/PO Patient is a 48 yr old woman, single, domiciled and unemployed, with past hx of bipolar disorder, PTSD, borderline personality disorder and Psychogenic Seizures, per PSYCKES Borderline Personality Disorder Unspecified/Other Depressive Disorder Major Depressive Disorder Bipolar I Unspecified/Other Anxiety Disorder Unspecified/Other Bipolar Conversion Disorder PTSD Schizoaffective Disorder, with multiple inpatient psychiatric admissions and ED visits, last seen at [facility] at 4/25/2022 ED, including recent hospitalizations 4/28/2004 to 5/28/2004 to [place] [facility] (?) per PSYCKES, 6/20/2021 to 6/25/2021 to [facility] for acute judson, 6/9/2023 to 6/23/2023 to [facility], 4/4/2022 to [facility] 3 due to worsening anxiety and did not feel her pain was being properly managed on the unit, demanding discharge and provided 3DL. Pt became agitated and accused staff of keeping her a prisoner.  Pt later on submitted a 3DL letter requesting release on 4/5/22. Per records hx of suicide attempt vs gesture - superficially cut her neck with glass (which did not require suturing) in 2014. there is no hx of illicit substance.  Pertinent medical issues: endometriosis, chronic pains and ? seizure disorder.  Today, presented to the hospital with worsening depressed mood.      Patient endorsing depressed and anxious moods, hopelessness and suicidal ideations.  Patient unable to engage in meaningful safety planning.  Patient is an acute danger to self and others at this time.  Patient lacks insight and judgment into illness and remains an acute safety risk and warrants inpatient psychiatric hospitalization for safety and stabilization.    4/26: Pt demonstrating labile affect, tangential thoughts, and splitting towards interviewer. Pt turned acutely agitated re: interviewer wearing a mask, referring to her brother, referring to ex-boyfriend, and recalling prior restraint in hospitalization. Endorses she has suicidal thoughts without plan or attempt, acknowledges that she needs help. States she doesn't think she'll ever be okay or that anything will change following her hospitalization. Says inpatient psych only to pump her with pills. AOx4. No HI/hallucinations/delusions.     Plan:   1. Inpatient admission to [facility] 2W on a 9.13 legal status  2. Routine checks, no CO indicated in a locked, supervised, inpatient setting  3. Medications: c/w Lamotrigine 150 mg BID for bipolar depression, start Gabapentin 300mg QHS and 100mg qAM for anxiety/pain, Trazadone 100 mg qHS prn insomnia  4. PRNs: Zyprexa 5mg IM/PO, Ativan 2mg PO, Benadryl 50mg IM/PO for agitation, Ativan 0.5mg PO q6hr prn anxiety  5. Lidocaine patch prn back pain, Tylenol/Maalox/MOM prn  6. Group/ Milieu therapy  7. SW to pursue discharge planning

## 2024-04-26 NOTE — BH INPATIENT PSYCHIATRY ASSESSMENT NOTE - OTHER
"up" Preoccupied by positive effects of Ativan, asked multiple times whether she could access daily

## 2024-04-26 NOTE — BH INPATIENT PSYCHIATRY ASSESSMENT NOTE - NSICDXPASTMEDICALHX_GEN_ALL_CORE_FT
PAST MEDICAL HISTORY:  Bipolar 1 disorder     Endometriosis     Epilepsy     Genital herpes     GERD (gastroesophageal reflux disease)     H/O degenerative disc disease     High cholesterol     Major depression     PTSD (post-traumatic stress disorder)     Seizures

## 2024-04-26 NOTE — BH INPATIENT PSYCHIATRY ASSESSMENT NOTE - OTHER PAST PSYCHIATRIC HISTORY (INCLUDE DETAILS REGARDING ONSET, COURSE OF ILLNESS, INPATIENT/OUTPATIENT TREATMENT)
per JEANIE Borderline Personality Disorder Unspecified/Other Depressive Disorder Major Depressive Disorder Bipolar I Unspecified/Other Anxiety Disorder Unspecified/Other Bipolar Conversion Disorder PTSD Schizoaffective Disorder, with multiple inpatient psychiatric admissions and ED visits, last seen at [facility] at 4/25/2022 ED, including recent hospitalizations 4/28/2004 to 5/28/2004 to [place] [facility] (?) per JEANIE, 6/20/2021 to 6/25/2021 to [facility] for acute judson, 6/9/2023 to 6/23/2023 to [facility], 4/4/2022 to [facility] due to worsening anxiety, per records hx of suicide attempt vs gesture - superficially cut her neck with glass (which did not require suturing) in 2014.

## 2024-04-26 NOTE — PSYCHIATRIC REHAB INITIAL EVALUATION - NSBHPRRECOMMEND_PSY_ALL_CORE
Writer met with patient in order to orient patient to unit and briefly introduce patient to psychiatric rehabilitation staff and department function. Patient will be provided with a copy of unit schedule at a later time. Patient is not a reliable historian. Chart reports patient is admitted due to increased depression. 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 Patient reports depressive symptoms including depressed mood, anhedonia, changes in energy/concentration/appetite, sleep disturbances. Writer and patient established a collaborative psychiatric rehabilitation goal. Writer encouraged patient to attend psychiatric rehabilitation groups and engage in treatment. Psychiatric rehabilitation staff will engage patient daily.

## 2024-04-26 NOTE — BH INPATIENT PSYCHIATRY ASSESSMENT NOTE - CURRENT MEDICATION
MEDICATIONS  (STANDING):  lamoTRIgine 150 milliGRAM(s) Oral <User Schedule>  pantoprazole    Tablet 40 milliGRAM(s) Oral before breakfast    MEDICATIONS  (PRN):  acetaminophen     Tablet .. 650 milliGRAM(s) Oral every 6 hours PRN Mild Pain (1 - 3), Moderate Pain (4 - 6)  aluminum hydroxide/magnesium hydroxide/simethicone Suspension 30 milliLiter(s) Oral every 6 hours PRN Dyspepsia  diphenhydrAMINE 50 milliGRAM(s) Oral every 4 hours PRN agitation  diphenhydrAMINE Injectable 50 milliGRAM(s) IntraMuscular once PRN Agitation  haloperidol     Tablet 5 milliGRAM(s) Oral every 4 hours PRN agitation  haloperidol    Injectable 5 milliGRAM(s) IntraMuscular once PRN Agitation  lidocaine   4% Patch 1 Patch Transdermal daily PRN back pain  LORazepam     Tablet 2 milliGRAM(s) Oral every 4 hours PRN Agitation  LORazepam   Injectable 2 milliGRAM(s) IntraMuscular once PRN Agitation  melatonin. 3 milliGRAM(s) Oral at bedtime PRN Insomnia   MEDICATIONS  (STANDING):  gabapentin 300 milliGRAM(s) Oral at bedtime  lamoTRIgine 150 milliGRAM(s) Oral <User Schedule>  pantoprazole    Tablet 40 milliGRAM(s) Oral before breakfast    MEDICATIONS  (PRN):  acetaminophen     Tablet .. 650 milliGRAM(s) Oral every 6 hours PRN Mild Pain (1 - 3), Moderate Pain (4 - 6)  aluminum hydroxide/magnesium hydroxide/simethicone Suspension 30 milliLiter(s) Oral every 6 hours PRN Dyspepsia  diphenhydrAMINE 50 milliGRAM(s) Oral every 4 hours PRN agitation  diphenhydrAMINE Injectable 50 milliGRAM(s) IntraMuscular once PRN Agitation  lidocaine   4% Patch 1 Patch Transdermal daily PRN back pain  LORazepam     Tablet 0.5 milliGRAM(s) Oral every 6 hours PRN Anxiety  LORazepam     Tablet 2 milliGRAM(s) Oral every 4 hours PRN Agitation  OLANZapine 5 milliGRAM(s) Oral every 6 hours PRN agitation  OLANZapine Injectable 5 milliGRAM(s) IntraMuscular once PRN severe agitation  traZODone 100 milliGRAM(s) Oral at bedtime PRN insomnia

## 2024-04-27 PROCEDURE — 99232 SBSQ HOSP IP/OBS MODERATE 35: CPT

## 2024-04-27 RX ORDER — ASCORBIC ACID 60 MG
500 TABLET,CHEWABLE ORAL DAILY
Refills: 0 | Status: DISCONTINUED | OUTPATIENT
Start: 2024-04-27 | End: 2024-05-15

## 2024-04-27 RX ADMIN — GABAPENTIN 100 MILLIGRAM(S): 400 CAPSULE ORAL at 08:37

## 2024-04-27 RX ADMIN — GABAPENTIN 300 MILLIGRAM(S): 400 CAPSULE ORAL at 21:54

## 2024-04-27 RX ADMIN — Medication 100 MILLIGRAM(S): at 22:55

## 2024-04-27 RX ADMIN — LAMOTRIGINE 150 MILLIGRAM(S): 25 TABLET, ORALLY DISINTEGRATING ORAL at 08:36

## 2024-04-27 RX ADMIN — LAMOTRIGINE 150 MILLIGRAM(S): 25 TABLET, ORALLY DISINTEGRATING ORAL at 18:52

## 2024-04-27 RX ADMIN — Medication 0.5 MILLIGRAM(S): at 22:55

## 2024-04-27 RX ADMIN — Medication 0.5 MILLIGRAM(S): at 15:07

## 2024-04-27 RX ADMIN — PANTOPRAZOLE SODIUM 40 MILLIGRAM(S): 20 TABLET, DELAYED RELEASE ORAL at 08:37

## 2024-04-27 NOTE — BH INPATIENT PSYCHIATRY PROGRESS NOTE - NSBHFUPINTERVALHXFT_PSY_A_CORE
Pt compliant with medication and tolerating it well.  Chart reviewed and case discussed with nursing.  No events reported overnight.  Pt reports she slept better last night.  Pt denies SI/HI/I/P or AH/VH or paranoia.  Pt eating well.  Pt reports feeling anxious at times.

## 2024-04-27 NOTE — BH INPATIENT PSYCHIATRY PROGRESS NOTE - PRN MEDS
MEDICATIONS  (PRN):  acetaminophen     Tablet .. 650 milliGRAM(s) Oral every 6 hours PRN Mild Pain (1 - 3), Moderate Pain (4 - 6)  aluminum hydroxide/magnesium hydroxide/simethicone Suspension 30 milliLiter(s) Oral every 6 hours PRN Dyspepsia  diphenhydrAMINE 50 milliGRAM(s) Oral every 4 hours PRN agitation  diphenhydrAMINE Injectable 50 milliGRAM(s) IntraMuscular once PRN Agitation  lidocaine   4% Patch 1 Patch Transdermal daily PRN back pain  LORazepam     Tablet 2 milliGRAM(s) Oral every 4 hours PRN Agitation  LORazepam     Tablet 0.5 milliGRAM(s) Oral every 6 hours PRN Anxiety  magnesium hydroxide Suspension 30 milliLiter(s) Oral daily PRN Constipation  OLANZapine 5 milliGRAM(s) Oral every 6 hours PRN agitation  OLANZapine Injectable 5 milliGRAM(s) IntraMuscular once PRN severe agitation  traZODone 100 milliGRAM(s) Oral at bedtime PRN insomnia

## 2024-04-27 NOTE — BH INPATIENT PSYCHIATRY PROGRESS NOTE - CURRENT MEDICATION
MEDICATIONS  (STANDING):  gabapentin 100 milliGRAM(s) Oral daily  gabapentin 300 milliGRAM(s) Oral at bedtime  lamoTRIgine 150 milliGRAM(s) Oral <User Schedule>  pantoprazole    Tablet 40 milliGRAM(s) Oral before breakfast    MEDICATIONS  (PRN):  acetaminophen     Tablet .. 650 milliGRAM(s) Oral every 6 hours PRN Mild Pain (1 - 3), Moderate Pain (4 - 6)  aluminum hydroxide/magnesium hydroxide/simethicone Suspension 30 milliLiter(s) Oral every 6 hours PRN Dyspepsia  diphenhydrAMINE 50 milliGRAM(s) Oral every 4 hours PRN agitation  diphenhydrAMINE Injectable 50 milliGRAM(s) IntraMuscular once PRN Agitation  lidocaine   4% Patch 1 Patch Transdermal daily PRN back pain  LORazepam     Tablet 2 milliGRAM(s) Oral every 4 hours PRN Agitation  LORazepam     Tablet 0.5 milliGRAM(s) Oral every 6 hours PRN Anxiety  magnesium hydroxide Suspension 30 milliLiter(s) Oral daily PRN Constipation  OLANZapine 5 milliGRAM(s) Oral every 6 hours PRN agitation  OLANZapine Injectable 5 milliGRAM(s) IntraMuscular once PRN severe agitation  traZODone 100 milliGRAM(s) Oral at bedtime PRN insomnia

## 2024-04-28 LAB — SARS-COV-2 RNA SPEC QL NAA+PROBE: DETECTED

## 2024-04-28 PROCEDURE — 99232 SBSQ HOSP IP/OBS MODERATE 35: CPT

## 2024-04-28 RX ORDER — SODIUM CHLORIDE 0.65 %
1 AEROSOL, SPRAY (ML) NASAL
Refills: 0 | Status: DISCONTINUED | OUTPATIENT
Start: 2024-04-28 | End: 2024-05-15

## 2024-04-28 RX ORDER — BENZOCAINE AND MENTHOL 5; 1 G/100ML; G/100ML
1 LIQUID ORAL EVERY 4 HOURS
Refills: 0 | Status: DISCONTINUED | OUTPATIENT
Start: 2024-04-28 | End: 2024-05-15

## 2024-04-28 RX ADMIN — Medication 500 MILLIGRAM(S): at 08:15

## 2024-04-28 RX ADMIN — GABAPENTIN 300 MILLIGRAM(S): 400 CAPSULE ORAL at 21:04

## 2024-04-28 RX ADMIN — Medication 0.5 MILLIGRAM(S): at 13:16

## 2024-04-28 RX ADMIN — PANTOPRAZOLE SODIUM 40 MILLIGRAM(S): 20 TABLET, DELAYED RELEASE ORAL at 08:15

## 2024-04-28 RX ADMIN — LAMOTRIGINE 150 MILLIGRAM(S): 25 TABLET, ORALLY DISINTEGRATING ORAL at 19:09

## 2024-04-28 RX ADMIN — Medication 2 MILLIGRAM(S): at 13:08

## 2024-04-28 RX ADMIN — Medication 650 MILLIGRAM(S): at 16:15

## 2024-04-28 RX ADMIN — LAMOTRIGINE 150 MILLIGRAM(S): 25 TABLET, ORALLY DISINTEGRATING ORAL at 08:15

## 2024-04-28 RX ADMIN — Medication 650 MILLIGRAM(S): at 15:15

## 2024-04-28 RX ADMIN — Medication 0.5 MILLIGRAM(S): at 21:04

## 2024-04-28 RX ADMIN — Medication 650 MILLIGRAM(S): at 08:34

## 2024-04-28 RX ADMIN — Medication 650 MILLIGRAM(S): at 09:30

## 2024-04-28 NOTE — BH INPATIENT PSYCHIATRY PROGRESS NOTE - CURRENT MEDICATION
MEDICATIONS  (STANDING):  ascorbic acid 500 milliGRAM(s) Oral daily  gabapentin 100 milliGRAM(s) Oral daily  gabapentin 300 milliGRAM(s) Oral at bedtime  lamoTRIgine 150 milliGRAM(s) Oral <User Schedule>  pantoprazole    Tablet 40 milliGRAM(s) Oral before breakfast    MEDICATIONS  (PRN):  acetaminophen     Tablet .. 650 milliGRAM(s) Oral every 6 hours PRN Mild Pain (1 - 3), Moderate Pain (4 - 6)  aluminum hydroxide/magnesium hydroxide/simethicone Suspension 30 milliLiter(s) Oral every 6 hours PRN Dyspepsia  diphenhydrAMINE 50 milliGRAM(s) Oral every 4 hours PRN agitation  diphenhydrAMINE Injectable 50 milliGRAM(s) IntraMuscular once PRN Agitation  lidocaine   4% Patch 1 Patch Transdermal daily PRN back pain  LORazepam     Tablet 0.5 milliGRAM(s) Oral every 6 hours PRN Anxiety  LORazepam     Tablet 2 milliGRAM(s) Oral every 4 hours PRN Agitation  magnesium hydroxide Suspension 30 milliLiter(s) Oral daily PRN Constipation  OLANZapine 5 milliGRAM(s) Oral every 6 hours PRN agitation  OLANZapine Injectable 5 milliGRAM(s) IntraMuscular once PRN severe agitation  traZODone 100 milliGRAM(s) Oral at bedtime PRN insomnia

## 2024-04-28 NOTE — BH INPATIENT PSYCHIATRY PROGRESS NOTE - NSBHFUPINTERVALHXFT_PSY_A_CORE
Pt compliant with medication, but refused AM Neurontin.  Chart reviewed, no events reported overnight.  Pt reports poor sleep last night and fair appetite.  Pt denies HI/I/P or AH/VH or paranoia.  Pt reports feeling depressed because she is not happy with her life and she does not feel medications will help her.  Pt interested in individual therapy.  Pt reports passive SI no I/P.  Pt reports feeling less anxious because she is not picking at her cuticles.

## 2024-04-28 NOTE — BH INPATIENT PSYCHIATRY PROGRESS NOTE - PRN MEDS
MEDICATIONS  (PRN):  acetaminophen     Tablet .. 650 milliGRAM(s) Oral every 6 hours PRN Mild Pain (1 - 3), Moderate Pain (4 - 6)  aluminum hydroxide/magnesium hydroxide/simethicone Suspension 30 milliLiter(s) Oral every 6 hours PRN Dyspepsia  diphenhydrAMINE 50 milliGRAM(s) Oral every 4 hours PRN agitation  diphenhydrAMINE Injectable 50 milliGRAM(s) IntraMuscular once PRN Agitation  lidocaine   4% Patch 1 Patch Transdermal daily PRN back pain  LORazepam     Tablet 0.5 milliGRAM(s) Oral every 6 hours PRN Anxiety  LORazepam     Tablet 2 milliGRAM(s) Oral every 4 hours PRN Agitation  magnesium hydroxide Suspension 30 milliLiter(s) Oral daily PRN Constipation  OLANZapine 5 milliGRAM(s) Oral every 6 hours PRN agitation  OLANZapine Injectable 5 milliGRAM(s) IntraMuscular once PRN severe agitation  traZODone 100 milliGRAM(s) Oral at bedtime PRN insomnia

## 2024-04-29 PROCEDURE — 99232 SBSQ HOSP IP/OBS MODERATE 35: CPT | Mod: FS

## 2024-04-29 RX ORDER — FLUTICASONE PROPIONATE 50 MCG
1 SPRAY, SUSPENSION NASAL
Refills: 0 | Status: DISCONTINUED | OUTPATIENT
Start: 2024-04-29 | End: 2024-05-15

## 2024-04-29 RX ADMIN — LAMOTRIGINE 150 MILLIGRAM(S): 25 TABLET, ORALLY DISINTEGRATING ORAL at 08:43

## 2024-04-29 RX ADMIN — PANTOPRAZOLE SODIUM 40 MILLIGRAM(S): 20 TABLET, DELAYED RELEASE ORAL at 08:42

## 2024-04-29 RX ADMIN — LAMOTRIGINE 150 MILLIGRAM(S): 25 TABLET, ORALLY DISINTEGRATING ORAL at 20:35

## 2024-04-29 RX ADMIN — GABAPENTIN 100 MILLIGRAM(S): 400 CAPSULE ORAL at 13:14

## 2024-04-29 RX ADMIN — Medication 650 MILLIGRAM(S): at 01:59

## 2024-04-29 RX ADMIN — Medication 500 MILLIGRAM(S): at 08:42

## 2024-04-29 RX ADMIN — Medication 100 MILLIGRAM(S): at 21:50

## 2024-04-29 RX ADMIN — Medication 1 SPRAY(S): at 20:51

## 2024-04-29 RX ADMIN — Medication 0.5 MILLIGRAM(S): at 21:38

## 2024-04-29 RX ADMIN — GABAPENTIN 300 MILLIGRAM(S): 400 CAPSULE ORAL at 20:37

## 2024-04-29 NOTE — BH INPATIENT PSYCHIATRY PROGRESS NOTE - PRN MEDS
MEDICATIONS  (PRN):  acetaminophen     Tablet .. 650 milliGRAM(s) Oral every 6 hours PRN Mild Pain (1 - 3), Moderate Pain (4 - 6)  aluminum hydroxide/magnesium hydroxide/simethicone Suspension 30 milliLiter(s) Oral every 6 hours PRN Dyspepsia  benzocaine/menthol Lozenge 1 Lozenge Oral every 4 hours PRN Sore throat/cough  diphenhydrAMINE 50 milliGRAM(s) Oral every 4 hours PRN agitation  diphenhydrAMINE Injectable 50 milliGRAM(s) IntraMuscular once PRN Agitation  guaiFENesin  milliGRAM(s) Oral every 12 hours PRN Chest congestion  lidocaine   4% Patch 1 Patch Transdermal daily PRN back pain  LORazepam     Tablet 0.5 milliGRAM(s) Oral every 6 hours PRN Anxiety  LORazepam     Tablet 2 milliGRAM(s) Oral every 4 hours PRN Agitation  magnesium hydroxide Suspension 30 milliLiter(s) Oral daily PRN Constipation  OLANZapine 5 milliGRAM(s) Oral every 6 hours PRN agitation  OLANZapine Injectable 5 milliGRAM(s) IntraMuscular once PRN severe agitation  sodium chloride 0.65% Nasal 1 Spray(s) Both Nostrils five times a day PRN Nasal Congestion  traZODone 100 milliGRAM(s) Oral at bedtime PRN insomnia   MEDICATIONS  (PRN):  acetaminophen     Tablet .. 650 milliGRAM(s) Oral every 6 hours PRN Mild Pain (1 - 3), Moderate Pain (4 - 6)  aluminum hydroxide/magnesium hydroxide/simethicone Suspension 30 milliLiter(s) Oral every 6 hours PRN Dyspepsia  benzocaine/menthol Lozenge 1 Lozenge Oral every 4 hours PRN Sore throat/cough  diphenhydrAMINE 50 milliGRAM(s) Oral every 4 hours PRN agitation  diphenhydrAMINE Injectable 50 milliGRAM(s) IntraMuscular once PRN Agitation  guaiFENesin  milliGRAM(s) Oral every 12 hours PRN Chest congestion  lidocaine   4% Patch 1 Patch Transdermal daily PRN back pain  LORazepam     Tablet 2 milliGRAM(s) Oral every 4 hours PRN Agitation  LORazepam     Tablet 0.5 milliGRAM(s) Oral every 6 hours PRN Anxiety  magnesium hydroxide Suspension 30 milliLiter(s) Oral daily PRN Constipation  OLANZapine 5 milliGRAM(s) Oral every 6 hours PRN agitation  OLANZapine Injectable 5 milliGRAM(s) IntraMuscular once PRN severe agitation  sodium chloride 0.65% Nasal 1 Spray(s) Both Nostrils five times a day PRN Nasal Congestion  traZODone 100 milliGRAM(s) Oral at bedtime PRN insomnia

## 2024-04-29 NOTE — DIETITIAN INITIAL EVALUATION ADULT - OTHER INFO
Patient is a 48 y/o female with PPHx of bipolar disorder, PTSD, borderline personality disorder and Psychogenic Seizures, MDD, PTSD, Schizoaffective Disorder, with multiple inpatient psychiatric admissions and ED visits, last seen at [facility] at 4/25/2022 ED, PMHx of endometriosis, chronic pains, DDD, high cholesterol, and seizure disorder. Presented to the hospital with worsening depressed mood.    Met with patient in the hallway bench outside her room. Patient reports her appetite has been good and concerns of wt gain from medications, she prefers healthier food options such as grilled chicken veg platter while on the unit as she dislikes some of the meals provided here. Denies chewing/swallowing difficulties on current diet. NKFA reported. Food preferences taken and implemented on [employer]. Writer provided healthy & balanced diet with patient today for wt mgmt while on antipsychotic rx, patient verbalized understanding. Reports her UBW 168lb and wt has been stable. Current adm wt: 165lb (4/25/24). On vitamin C per MD order.

## 2024-04-29 NOTE — DIETITIAN INITIAL EVALUATION ADULT - PERTINENT LABORATORY DATA
Comprehensive Metabolic Panel (04.25.24 @ 14:40)   Sodium: 137 mmol/L  Potassium: 4.9 mmol/L  Chloride: 103 mmol/L  Carbon Dioxide: 24 mmol/L  Anion Gap: 10 mmol/L  Blood Urea Nitrogen: 12 mg/dL  Creatinine: 0.82 mg/dL  Glucose: 119 mg/dL  Calcium: 10.7 mg/dL  Protein Total: 7.6 g/dL  Albumin: 4.6 g/dL  Bilirubin Total: 0.3 mg/dL  Alkaline Phosphatase: 133 U/L  Aspartate Aminotransferase (AST/SGOT): 12 U/L  Alanine Aminotransferase (ALT/SGPT): 8 U/L  eGFR: 88: The estimated glomerular filtration rate (eGFR) is calculated using the   2021 CKD-EPI creatinine equation, which does not have a coefficient for   race and is validated in individuals 18 years of age and older (N Engl J   Med 2021; 385:8353-2896). Creatinine-based eGFR may be inaccurate in   various situations including but not limited to extremes of muscle mass,   altered dietary protein intake, or medications that affect renal tubular   creatinine secretion. mL/min/1.73m2

## 2024-04-29 NOTE — DIETITIAN INITIAL EVALUATION ADULT - PERTINENT MEDS FT
MEDICATIONS  (STANDING):  ascorbic acid 500 milliGRAM(s) Oral daily  fluticasone propionate 50 MICROgram(s)/spray Nasal Spray 1 Spray(s) Both Nostrils two times a day  gabapentin 100 milliGRAM(s) Oral daily  gabapentin 300 milliGRAM(s) Oral at bedtime  lamoTRIgine 150 milliGRAM(s) Oral <User Schedule>  pantoprazole    Tablet 40 milliGRAM(s) Oral before breakfast    MEDICATIONS  (PRN):  acetaminophen     Tablet .. 650 milliGRAM(s) Oral every 6 hours PRN Mild Pain (1 - 3), Moderate Pain (4 - 6)  aluminum hydroxide/magnesium hydroxide/simethicone Suspension 30 milliLiter(s) Oral every 6 hours PRN Dyspepsia  benzocaine/menthol Lozenge 1 Lozenge Oral every 4 hours PRN Sore throat/cough  diphenhydrAMINE 50 milliGRAM(s) Oral every 4 hours PRN agitation  diphenhydrAMINE Injectable 50 milliGRAM(s) IntraMuscular once PRN Agitation  guaiFENesin  milliGRAM(s) Oral every 12 hours PRN Chest congestion  lidocaine   4% Patch 1 Patch Transdermal daily PRN back pain  LORazepam     Tablet 0.5 milliGRAM(s) Oral every 6 hours PRN Anxiety  LORazepam     Tablet 2 milliGRAM(s) Oral every 4 hours PRN Agitation  magnesium hydroxide Suspension 30 milliLiter(s) Oral daily PRN Constipation  OLANZapine 5 milliGRAM(s) Oral every 6 hours PRN agitation  OLANZapine Injectable 5 milliGRAM(s) IntraMuscular once PRN severe agitation  sodium chloride 0.65% Nasal 1 Spray(s) Both Nostrils five times a day PRN Nasal Congestion  traZODone 100 milliGRAM(s) Oral at bedtime PRN insomnia

## 2024-04-29 NOTE — BH INPATIENT PSYCHIATRY PROGRESS NOTE - CURRENT MEDICATION
MEDICATIONS  (STANDING):  ascorbic acid 500 milliGRAM(s) Oral daily  fluticasone propionate 50 MICROgram(s)/spray Nasal Spray 1 Spray(s) Both Nostrils two times a day  gabapentin 100 milliGRAM(s) Oral daily  gabapentin 300 milliGRAM(s) Oral at bedtime  lamoTRIgine 150 milliGRAM(s) Oral <User Schedule>  pantoprazole    Tablet 40 milliGRAM(s) Oral before breakfast    MEDICATIONS  (PRN):  acetaminophen     Tablet .. 650 milliGRAM(s) Oral every 6 hours PRN Mild Pain (1 - 3), Moderate Pain (4 - 6)  aluminum hydroxide/magnesium hydroxide/simethicone Suspension 30 milliLiter(s) Oral every 6 hours PRN Dyspepsia  benzocaine/menthol Lozenge 1 Lozenge Oral every 4 hours PRN Sore throat/cough  diphenhydrAMINE 50 milliGRAM(s) Oral every 4 hours PRN agitation  diphenhydrAMINE Injectable 50 milliGRAM(s) IntraMuscular once PRN Agitation  guaiFENesin  milliGRAM(s) Oral every 12 hours PRN Chest congestion  lidocaine   4% Patch 1 Patch Transdermal daily PRN back pain  LORazepam     Tablet 0.5 milliGRAM(s) Oral every 6 hours PRN Anxiety  LORazepam     Tablet 2 milliGRAM(s) Oral every 4 hours PRN Agitation  magnesium hydroxide Suspension 30 milliLiter(s) Oral daily PRN Constipation  OLANZapine 5 milliGRAM(s) Oral every 6 hours PRN agitation  OLANZapine Injectable 5 milliGRAM(s) IntraMuscular once PRN severe agitation  sodium chloride 0.65% Nasal 1 Spray(s) Both Nostrils five times a day PRN Nasal Congestion  traZODone 100 milliGRAM(s) Oral at bedtime PRN insomnia   MEDICATIONS  (STANDING):  ascorbic acid 500 milliGRAM(s) Oral daily  fluticasone propionate 50 MICROgram(s)/spray Nasal Spray 1 Spray(s) Both Nostrils two times a day  gabapentin 100 milliGRAM(s) Oral daily  gabapentin 300 milliGRAM(s) Oral at bedtime  lamoTRIgine 150 milliGRAM(s) Oral <User Schedule>  pantoprazole    Tablet 40 milliGRAM(s) Oral before breakfast    MEDICATIONS  (PRN):  acetaminophen     Tablet .. 650 milliGRAM(s) Oral every 6 hours PRN Mild Pain (1 - 3), Moderate Pain (4 - 6)  aluminum hydroxide/magnesium hydroxide/simethicone Suspension 30 milliLiter(s) Oral every 6 hours PRN Dyspepsia  benzocaine/menthol Lozenge 1 Lozenge Oral every 4 hours PRN Sore throat/cough  diphenhydrAMINE 50 milliGRAM(s) Oral every 4 hours PRN agitation  diphenhydrAMINE Injectable 50 milliGRAM(s) IntraMuscular once PRN Agitation  guaiFENesin  milliGRAM(s) Oral every 12 hours PRN Chest congestion  lidocaine   4% Patch 1 Patch Transdermal daily PRN back pain  LORazepam     Tablet 2 milliGRAM(s) Oral every 4 hours PRN Agitation  LORazepam     Tablet 0.5 milliGRAM(s) Oral every 6 hours PRN Anxiety  magnesium hydroxide Suspension 30 milliLiter(s) Oral daily PRN Constipation  OLANZapine 5 milliGRAM(s) Oral every 6 hours PRN agitation  OLANZapine Injectable 5 milliGRAM(s) IntraMuscular once PRN severe agitation  sodium chloride 0.65% Nasal 1 Spray(s) Both Nostrils five times a day PRN Nasal Congestion  traZODone 100 milliGRAM(s) Oral at bedtime PRN insomnia

## 2024-04-29 NOTE — BH INPATIENT PSYCHIATRY PROGRESS NOTE - NSBHFUPINTERVALHXFT_PSY_A_CORE
Pt assessed for depression and SI. Chart reviewed and case discussed with treatment team. No interval events reported overnight - pt noted to refuse AM dose of gabapentin over the weekend. Pt reports multiple medication trials though she declines to name medications tried. Pt states she is on lamictal for her epilepsy, not for psychiatric reasons. Pt states that she has been seeing NP Joelle Hilton, but does not feel this writer contacting the NP would be of any help, "She doesn't have a sense of what's going on with me." Pt states she was prescribed zyprexa 5 mg PO QHS and self tapered to 2.5 mg PO QHS and ultimately discontinued it 2-3 weeks ago. Pt states that it helped with her mood, but "there's a side effect of weight gain and dry mouth." Pt states that she has had cavities in the past and does not want to have this again with dry mouth. Pt states she did speak with her dentist who gave her ways to mitigate dry mouth. Pt was also educated on making healthy food choices if zyprexa increases her appetite. Pt negativistic and states she is unable to make good choices for herself. Pt did not state that she actually gained weight on the zyprexa, but she stopped it none the less. Discussed risks vs benefits - pt declined to re-start zyprexa despite positive effects on low dosage. Discussed medication alternatives and pt again negativistic and declined medications discussed. Pt noted to not take gabapentin AM doses after taking one dose. Pt states she felt sedated after the one dose. Pt provided with medication education that anything given for anxiety can potentially cause sedation, but if she tries more than one dose, perhaps her body may adjust to it. Pt more amenable to this and agrees to take gabapentin 100 mg PO QD now. Pt is help seeking and rejecting, hopeless, requires a great deal of reassurances. +SI without intent or plan, pt agrees to come to staff immediately with any safety concerns. COVID+ 4/28/24 with re-testing 5/5 and 5/6/24. Pt reports sxs of COVID improving, asked for flonase for post nasal drip - ordered.     PMH: degenerative disc disease, epilepsy, COVID+  Allergies: sudafed (hives), clindamycin, codeine, oxycodone  Substances: denies

## 2024-04-29 NOTE — DIETITIAN INITIAL EVALUATION ADULT - ADD RECOMMEND
Encourage healthy food choices and portion control.  Monitor PO intake/tolerance, weights, labs, BM's, and skin integrity.

## 2024-04-30 PROCEDURE — 99232 SBSQ HOSP IP/OBS MODERATE 35: CPT | Mod: FS

## 2024-04-30 RX ADMIN — GABAPENTIN 300 MILLIGRAM(S): 400 CAPSULE ORAL at 20:34

## 2024-04-30 RX ADMIN — Medication 650 MILLIGRAM(S): at 18:20

## 2024-04-30 RX ADMIN — Medication 1 SPRAY(S): at 09:11

## 2024-04-30 RX ADMIN — GABAPENTIN 100 MILLIGRAM(S): 400 CAPSULE ORAL at 09:12

## 2024-04-30 RX ADMIN — Medication 1 SPRAY(S): at 22:40

## 2024-04-30 RX ADMIN — Medication 500 MILLIGRAM(S): at 09:12

## 2024-04-30 RX ADMIN — LAMOTRIGINE 150 MILLIGRAM(S): 25 TABLET, ORALLY DISINTEGRATING ORAL at 07:53

## 2024-04-30 RX ADMIN — Medication 0.5 MILLIGRAM(S): at 18:21

## 2024-04-30 RX ADMIN — LAMOTRIGINE 150 MILLIGRAM(S): 25 TABLET, ORALLY DISINTEGRATING ORAL at 18:22

## 2024-04-30 RX ADMIN — Medication 1 SPRAY(S): at 21:39

## 2024-04-30 RX ADMIN — PANTOPRAZOLE SODIUM 40 MILLIGRAM(S): 20 TABLET, DELAYED RELEASE ORAL at 07:53

## 2024-04-30 RX ADMIN — Medication 100 MILLIGRAM(S): at 21:33

## 2024-04-30 NOTE — BH INPATIENT PSYCHIATRY PROGRESS NOTE - CURRENT MEDICATION
MEDICATIONS  (STANDING):  ascorbic acid 500 milliGRAM(s) Oral daily  fluticasone propionate 50 MICROgram(s)/spray Nasal Spray 1 Spray(s) Both Nostrils two times a day  gabapentin 100 milliGRAM(s) Oral daily  gabapentin 300 milliGRAM(s) Oral at bedtime  lamoTRIgine 150 milliGRAM(s) Oral <User Schedule>  pantoprazole    Tablet 40 milliGRAM(s) Oral before breakfast    MEDICATIONS  (PRN):  acetaminophen     Tablet .. 650 milliGRAM(s) Oral every 6 hours PRN Mild Pain (1 - 3), Moderate Pain (4 - 6)  aluminum hydroxide/magnesium hydroxide/simethicone Suspension 30 milliLiter(s) Oral every 6 hours PRN Dyspepsia  benzocaine/menthol Lozenge 1 Lozenge Oral every 4 hours PRN Sore throat/cough  diphenhydrAMINE 50 milliGRAM(s) Oral every 4 hours PRN agitation  diphenhydrAMINE Injectable 50 milliGRAM(s) IntraMuscular once PRN Agitation  guaiFENesin  milliGRAM(s) Oral every 12 hours PRN Chest congestion  lidocaine   4% Patch 1 Patch Transdermal daily PRN back pain  LORazepam     Tablet 0.5 milliGRAM(s) Oral every 6 hours PRN Anxiety  LORazepam     Tablet 2 milliGRAM(s) Oral every 4 hours PRN Agitation  magnesium hydroxide Suspension 30 milliLiter(s) Oral daily PRN Constipation  OLANZapine 5 milliGRAM(s) Oral every 6 hours PRN agitation  OLANZapine Injectable 5 milliGRAM(s) IntraMuscular once PRN severe agitation  sodium chloride 0.65% Nasal 1 Spray(s) Both Nostrils five times a day PRN Nasal Congestion  traZODone 100 milliGRAM(s) Oral at bedtime PRN insomnia   MEDICATIONS  (STANDING):  ascorbic acid 500 milliGRAM(s) Oral daily  fluticasone propionate 50 MICROgram(s)/spray Nasal Spray 1 Spray(s) Both Nostrils two times a day  gabapentin 300 milliGRAM(s) Oral at bedtime  gabapentin 100 milliGRAM(s) Oral daily  lamoTRIgine 150 milliGRAM(s) Oral <User Schedule>  pantoprazole    Tablet 40 milliGRAM(s) Oral before breakfast    MEDICATIONS  (PRN):  acetaminophen     Tablet .. 650 milliGRAM(s) Oral every 6 hours PRN Mild Pain (1 - 3), Moderate Pain (4 - 6)  aluminum hydroxide/magnesium hydroxide/simethicone Suspension 30 milliLiter(s) Oral every 6 hours PRN Dyspepsia  benzocaine/menthol Lozenge 1 Lozenge Oral every 4 hours PRN Sore throat/cough  diphenhydrAMINE 50 milliGRAM(s) Oral every 4 hours PRN agitation  diphenhydrAMINE Injectable 50 milliGRAM(s) IntraMuscular once PRN Agitation  guaiFENesin  milliGRAM(s) Oral every 12 hours PRN Chest congestion  lidocaine   4% Patch 1 Patch Transdermal daily PRN back pain  LORazepam     Tablet 2 milliGRAM(s) Oral every 4 hours PRN Agitation  LORazepam     Tablet 0.5 milliGRAM(s) Oral every 6 hours PRN Anxiety  magnesium hydroxide Suspension 30 milliLiter(s) Oral daily PRN Constipation  OLANZapine 5 milliGRAM(s) Oral every 6 hours PRN agitation  OLANZapine Injectable 5 milliGRAM(s) IntraMuscular once PRN severe agitation  sodium chloride 0.65% Nasal 1 Spray(s) Both Nostrils five times a day PRN Nasal Congestion  traZODone 100 milliGRAM(s) Oral at bedtime PRN insomnia

## 2024-04-30 NOTE — BH INPATIENT PSYCHIATRY PROGRESS NOTE - NSBHFUPINTERVALHXFT_PSY_A_CORE
Pt assessed for depression and SI. Chart reviewed and case discussed with treatment team. No interval events reported overnight. Pt noted to take AM dose of gabapentin  today and provided with positive reinforcement for this. Pt states she does not feel a difference with it thus far, denies sedation. Pt with passive SI, denies intent or plan, agrees to come to staff immediately with any safety concerns. Pt help seeking and help rejecting. Pt spoke at length about wanting things to change, but being unable to enact change. Attempted to discuss coping mechanisms and putting in the work, but pt deflected this and began asking about getting something to drink on the unit besides soda. Pt encouraged to speak to nutritionist (consult requested). Attempted to discuss alternative medications, but pt again deflected this and was unable to tolerate speaking more in depth about problem solving. Pt becomes tearful when asked how staff may help her and pt unable to come up with anything specific. COVID+ 4/28/24 with re-testing 5/5 and 5/6/24. Pt reports sxs of COVID improving, flonase is helpful for post nasal drip. Pt declines further increase in gabapentin for now   Pt assessed for depression and SI. Chart reviewed and case discussed with treatment team. No interval events reported overnight. Pt noted to take AM dose of gabapentin  today and provided with positive reinforcement for this. Pt states she does not feel a difference with it thus far, denies sedation. Pt with passive SI, denies intent or plan, agrees to come to staff immediately with any safety concerns. Pt help seeking and help rejecting. Pt spoke at length about wanting things to change, but being unable to enact change. Attempted to discuss coping mechanisms and putting in the work, but pt deflected this and began asking about getting something to drink on the unit besides soda. Pt encouraged to speak to nutritionist (consult requested). Attempted to discuss alternative medications, but pt again deflected this and was unable to tolerate speaking more in depth about problem solving. Pt becomes tearful when asked how staff may help her and pt unable to come up with anything specific. COVID+ 4/28/24 with re-testing 5/5 and 5/6/24. Pt reports sxs of COVID improving, flonase is helpful for post nasal drip. Pt declines further increase in gabapentin for now. Joelle Hilton NP ([phone]) or clinic is [facility] [phone] called, message left, awaiting call back

## 2024-05-01 PROCEDURE — 99232 SBSQ HOSP IP/OBS MODERATE 35: CPT | Mod: FS

## 2024-05-01 RX ORDER — GABAPENTIN 400 MG/1
300 CAPSULE ORAL DAILY
Refills: 0 | Status: DISCONTINUED | OUTPATIENT
Start: 2024-05-01 | End: 2024-05-06

## 2024-05-01 RX ADMIN — Medication 0.5 MILLIGRAM(S): at 17:12

## 2024-05-01 RX ADMIN — Medication 500 MILLIGRAM(S): at 09:01

## 2024-05-01 RX ADMIN — GABAPENTIN 100 MILLIGRAM(S): 400 CAPSULE ORAL at 09:01

## 2024-05-01 RX ADMIN — LAMOTRIGINE 150 MILLIGRAM(S): 25 TABLET, ORALLY DISINTEGRATING ORAL at 09:00

## 2024-05-01 RX ADMIN — Medication 100 MILLIGRAM(S): at 21:15

## 2024-05-01 RX ADMIN — GABAPENTIN 300 MILLIGRAM(S): 400 CAPSULE ORAL at 21:15

## 2024-05-01 RX ADMIN — LAMOTRIGINE 150 MILLIGRAM(S): 25 TABLET, ORALLY DISINTEGRATING ORAL at 19:14

## 2024-05-01 RX ADMIN — PANTOPRAZOLE SODIUM 40 MILLIGRAM(S): 20 TABLET, DELAYED RELEASE ORAL at 09:01

## 2024-05-01 NOTE — BH INPATIENT PSYCHIATRY PROGRESS NOTE - NSBHFUPINTERVALHXFT_PSY_A_CORE
Pt assessed for depression and SI. Chart reviewed and case discussed with treatment team. No interval events reported overnight. On approach, pt pacing halls with peer. Pt with continued passive SI, denies intent or plan, agrees to come to staff immediately with any safety concerns. Pt help seeking and help rejecting. Attempted again to discuss coping mechanisms and putting in the work, but pt again deflected this. Pt's deflection addressed, pt acknowledges that she does this, but when asked how she may address her concerns in a more direct manner, pt shrugs her shoulders in dismissive manner. Pt becomes tearful when asked how staff may help her and states, "Nothing." Pt asked what she wanted out of this hospitalization and pt stated this hospitalization was "a  out from my life." Discussed discharge planning and pt states she completed PHP x 2 and DBT, but admits to not putting effort into the programs and not using the skills taught. Attempted to problem solve with pt - pt states she does not eat healthily in the community, suggestions of taking a cooking class to not only cook healthy food, but also to socialize were met with negativity, "No, I don't want to do that." Pt encouraged to think of ways that she may change the things that are making her unhappy and how she may implement these changes in small, doable steps. COVID+ 4/28/24 with re-testing 5/5 and 5/6/24. Pt reports sxs of COVID improving, flonase is helpful for post nasal drip. Pt agrees to increase gabapentin to 300 mg PO BID.

## 2024-05-01 NOTE — BH INPATIENT PSYCHIATRY PROGRESS NOTE - ATTENDING COMMENTS
Care was discussed and reviewed in the interdisciplinary treatment team.  I, Malick Valle MD, have reviewed and verified the documentation.  I independently performed the documented medical decision making. Care was discussed and reviewed in the interdisciplinary treatment team.  I, Maryam Hawk MD, have reviewed and verified the documentation.  I independently performed the documented medical decision making.

## 2024-05-01 NOTE — BH INPATIENT PSYCHIATRY PROGRESS NOTE - CURRENT MEDICATION
MEDICATIONS  (STANDING):  ascorbic acid 500 milliGRAM(s) Oral daily  fluticasone propionate 50 MICROgram(s)/spray Nasal Spray 1 Spray(s) Both Nostrils two times a day  gabapentin 300 milliGRAM(s) Oral at bedtime  gabapentin 300 milliGRAM(s) Oral daily  lamoTRIgine 150 milliGRAM(s) Oral <User Schedule>  pantoprazole    Tablet 40 milliGRAM(s) Oral before breakfast    MEDICATIONS  (PRN):  acetaminophen     Tablet .. 650 milliGRAM(s) Oral every 6 hours PRN Mild Pain (1 - 3), Moderate Pain (4 - 6)  aluminum hydroxide/magnesium hydroxide/simethicone Suspension 30 milliLiter(s) Oral every 6 hours PRN Dyspepsia  benzocaine/menthol Lozenge 1 Lozenge Oral every 4 hours PRN Sore throat/cough  diphenhydrAMINE 50 milliGRAM(s) Oral every 4 hours PRN agitation  diphenhydrAMINE Injectable 50 milliGRAM(s) IntraMuscular once PRN Agitation  guaiFENesin  milliGRAM(s) Oral every 12 hours PRN Chest congestion  lidocaine   4% Patch 1 Patch Transdermal daily PRN back pain  LORazepam     Tablet 0.5 milliGRAM(s) Oral every 6 hours PRN Anxiety  LORazepam     Tablet 2 milliGRAM(s) Oral every 4 hours PRN Agitation  magnesium hydroxide Suspension 30 milliLiter(s) Oral daily PRN Constipation  OLANZapine 5 milliGRAM(s) Oral every 6 hours PRN agitation  OLANZapine Injectable 5 milliGRAM(s) IntraMuscular once PRN severe agitation  sodium chloride 0.65% Nasal 1 Spray(s) Both Nostrils five times a day PRN Nasal Congestion  traZODone 100 milliGRAM(s) Oral at bedtime PRN insomnia   MEDICATIONS  (STANDING):  ascorbic acid 500 milliGRAM(s) Oral daily  fluticasone propionate 50 MICROgram(s)/spray Nasal Spray 1 Spray(s) Both Nostrils two times a day  gabapentin 300 milliGRAM(s) Oral daily  gabapentin 300 milliGRAM(s) Oral at bedtime  lamoTRIgine 150 milliGRAM(s) Oral <User Schedule>  pantoprazole    Tablet 40 milliGRAM(s) Oral before breakfast    MEDICATIONS  (PRN):  acetaminophen     Tablet .. 650 milliGRAM(s) Oral every 6 hours PRN Mild Pain (1 - 3), Moderate Pain (4 - 6)  aluminum hydroxide/magnesium hydroxide/simethicone Suspension 30 milliLiter(s) Oral every 6 hours PRN Dyspepsia  benzocaine/menthol Lozenge 1 Lozenge Oral every 4 hours PRN Sore throat/cough  diphenhydrAMINE 50 milliGRAM(s) Oral every 4 hours PRN agitation  diphenhydrAMINE Injectable 50 milliGRAM(s) IntraMuscular once PRN Agitation  guaiFENesin  milliGRAM(s) Oral every 12 hours PRN Chest congestion  lidocaine   4% Patch 1 Patch Transdermal daily PRN back pain  LORazepam     Tablet 2 milliGRAM(s) Oral every 4 hours PRN Agitation  LORazepam     Tablet 0.5 milliGRAM(s) Oral every 6 hours PRN Anxiety  magnesium hydroxide Suspension 30 milliLiter(s) Oral daily PRN Constipation  OLANZapine 5 milliGRAM(s) Oral every 6 hours PRN agitation  OLANZapine Injectable 5 milliGRAM(s) IntraMuscular once PRN severe agitation  sodium chloride 0.65% Nasal 1 Spray(s) Both Nostrils five times a day PRN Nasal Congestion  traZODone 100 milliGRAM(s) Oral at bedtime PRN insomnia

## 2024-05-02 PROCEDURE — 99232 SBSQ HOSP IP/OBS MODERATE 35: CPT | Mod: FS

## 2024-05-02 RX ORDER — LAMOTRIGINE 25 MG/1
150 TABLET, ORALLY DISINTEGRATING ORAL
Refills: 0 | Status: DISCONTINUED | OUTPATIENT
Start: 2024-05-02 | End: 2024-05-15

## 2024-05-02 RX ADMIN — GABAPENTIN 300 MILLIGRAM(S): 400 CAPSULE ORAL at 09:45

## 2024-05-02 RX ADMIN — Medication 500 MILLIGRAM(S): at 09:45

## 2024-05-02 RX ADMIN — PANTOPRAZOLE SODIUM 40 MILLIGRAM(S): 20 TABLET, DELAYED RELEASE ORAL at 09:45

## 2024-05-02 RX ADMIN — GABAPENTIN 300 MILLIGRAM(S): 400 CAPSULE ORAL at 20:22

## 2024-05-02 RX ADMIN — Medication 0.5 MILLIGRAM(S): at 13:07

## 2024-05-02 RX ADMIN — LAMOTRIGINE 150 MILLIGRAM(S): 25 TABLET, ORALLY DISINTEGRATING ORAL at 20:22

## 2024-05-02 RX ADMIN — LAMOTRIGINE 150 MILLIGRAM(S): 25 TABLET, ORALLY DISINTEGRATING ORAL at 06:21

## 2024-05-02 RX ADMIN — Medication 100 MILLIGRAM(S): at 21:31

## 2024-05-02 RX ADMIN — Medication 1 SPRAY(S): at 09:44

## 2024-05-02 NOTE — BH INPATIENT PSYCHIATRY PROGRESS NOTE - NSBHFUPINTERVALHXFT_PSY_A_CORE
Pt assessed for depression and SI. Chart reviewed and case discussed with treatment team. No interval events reported overnight. On approach, pt speaking with ARIS Grimm re: dispo planing. Pt states she would like AOPD referral; considering PHP or PACE. Pt with continued chronic, passive SI of "ending my life," but denies intent or plan, agrees to come to staff immediately with any safety concerns. Pt states that she would not self harm because she does not know if it will work and "I don't think I could ever do it." Pt initially unable to cite protective factors, but was then able to state that she has friends and family that are protective factors. Pt help seeking and help rejecting; seeks reassurances rather that implementing her coping skills. Pt asks that her lamictal dosing times be changed to match her gabapentin dosing schedule. No SE reported. COVID+ 4/28/24 with re-testing 5/5 and 5/6/24. Pt agrees to c/w increase gabapentin to 300 mg PO BID.

## 2024-05-02 NOTE — BH INPATIENT PSYCHIATRY DISCHARGE NOTE - GENERAL APPEARANCE
"Patient Education       Controlling Your Blood Pressure Through Lifestyle   The Basics   Written by the doctors and editors at [facility]   What does my lifestyle have to do with my blood pressure? -- The things you do and the foods you eat have a big effect on your blood pressure and your overall health. Following the right lifestyle can:  Lower your blood pressure or keep you from getting high blood pressure in the first place  Reduce your need for blood pressure medicines  Make medicines for high blood pressure work better, if you do take them  Lower the chances that you'll have a heart attack or stroke, or develop kidney disease  Which lifestyle choices will help lower my blood pressure? -- Here's what you can do:  Lose weight (if you are overweight)  Choose a diet rich in fruits, vegetables, and low-fat dairy products, and low in meats, sweets, and refined grains  Eat less salt (sodium)  Do something active for at least 30 minutes a day on most days of the week  Limit the amount of alcohol you drink  If you have high blood pressure, it's also very important to quit smoking (if you smoke). Quitting smoking might not bring your blood pressure down. But it will lower the chances that you'll have a heart attack or stroke, and it will help you feel better and live longer.  Start low and go slow -- The changes listed above might sound like a lot, but don't worry. You don't have to change everything all at once. The key to improving your lifestyle is to "start low and go slow." Choose 1 small, specific thing to change and try doing it for a while. If it works for you, keep doing it until it becomes a habit. If it doesn't, don't give up. Choose something else to change and see how that goes.  Let's say, for example, that you would like to improve your diet. If you're the type of person who eats cheeseburgers and French fries all the time, you can't switch to eating just salads from one day to the next. When people try to "
"make changes like that, they often fail. Then they feel frustrated and tend to give up. So instead of trying to change everything about your diet in 1 day, change 1 or 2 small things about your diet and give yourself time to get used to those changes. For instance, keep the cheeseburger but give up the French fries. Or eat the same things but cut your portions in half.  As you find things that you are able to change and stick with, keep adding new changes. In time, you will see that you can actually change a lot. You just have to get used to the changes slowly.  Lose weight -- When people think about losing weight, they sometimes make it more complicated than it really is. To lose weight, you have to either eat less or move more. If you do both of those things, it's even better. But there is no single weight-loss diet or activity that's better than any other. When it comes to weight loss, the most effective plan is the one that you'll stick with.  Improve your diet -- There is no single diet that is right for everyone. But in general, a healthy diet can include:  Lots of fruits, vegetables, and whole grains  Some beans, peas, lentils, chickpeas, and similar foods  Some nuts, such as walnuts, almonds, and peanuts  Fat-free or low-fat milk and milk products  Some fish  To have a healthy diet, it's also important to limit or avoid sugar, sweets, meats, and refined grains. (Refined grains are found in white bread, white rice, most forms of pasta, and most packaged "snack" foods.)  Reduce salt -- Many people think that eating a low-sodium diet means avoiding the salt shaker and not adding salt when cooking. The truth is, not adding salt at the table or when you cook will only help a little. Almost all of the sodium you eat is already in the food you buy at the grocery store or at restaurants (figure 1).  The most important thing you can do to cut down on sodium is to eat less processed food. That means that you should "
"avoid most foods that are sold in cans, boxes, jars, and bags. You should also eat in restaurants less often.  To reduce the amount of sodium you get, buy fresh or fresh-frozen fruits, vegetables, and meats. (Fresh-frozen foods have had nothing added to them before freezing.) Then you can make meals at home, from scratch, with these ingredients.  As with the other changes, don't try to cut out salt all at once. Instead, choose 1 or 2 foods that have a lot of sodium and try to replace them with low-sodium choices. When you get used to those low-sodium options, find another food or 2 to change. Then keep going, until all the foods you eat are sodium-free or low in sodium.  Become more active -- If you want to be more active, you don't have to go to the gym or get all sweaty. It is possible to increase your activity level while doing everyday things you enjoy. Walking, gardening, and dancing are just a few of the things that you might try. As with all the other changes, the key is not to do too much too fast. If you don't do any activity now, start by walking for just a few minutes every other day. Do that for a few weeks. If you stick with it, try doing it for longer. But if you find that you don't like walking, try a different activity.  Drink less alcohol -- If you are a woman, do not have more than 1 "standard drink" of alcohol a day. If you are a man, do not have more than 2. A "standard drink" is:  A can or bottle that has 12 ounces of beer  A glass that has 5 ounces of wine  A shot that has 1.5 ounces of whiskey  Where should I start? -- If you want to improve your lifestyle, start by making the changes that you think would be easiest for you. If you used to exercise and just got out of the habit, maybe it would be easy for you to start exercising again. Or if you actually like cooking meals from scratch, maybe the first thing you should focus on is eating home-cooked meals that are low in sodium.  Whatever you "
"tackle first, choose specific, realistic goals, and give yourself a deadline. For example, do not decide that you are going to "exercise more." Instead, decide that you are going to walk for 10 minutes on Monday, Wednesday, and Friday, and that you are going to do this for the next 2 weeks.  When lifestyle changes are too general, people have a hard time following through.  Now go. You can do it!  All topics are updated as new evidence becomes available and our peer review process is complete.  This topic retrieved from [employer] on: Sep 21, 2021.  Topic 09293 Version 8.0  Release: 29.4.2 - C29.263  © 2021 UpToDate, Inc. and/or its affiliates. All rights reserved.  figure 1: Sources of sodium in your diet     Graphic 37278 Version 2.0    Consumer Information Use and Disclaimer   This information is not specific medical advice and does not replace information you receive from your health care provider. This is only a brief summary of general information. It does NOT include all information about conditions, illnesses, injuries, tests, procedures, treatments, therapies, discharge instructions or life-style choices that may apply to you. You must talk with your health care provider for complete information about your health and treatment options. This information should not be used to decide whether or not to accept your health care provider's advice, instructions or recommendations. Only your health care provider has the knowledge and training to provide advice that is right for you. The use of this information is governed by the [employer] End User License Agreement, available at https://www.[employer]Wagon/en/solutions/[email or web address]/[email or web address]/patrick.The use of [employer] content is governed by the [employer] Terms of Use. ©2021 UpToDate, Inc. All rights reserved.  Copyright   © 2021 UpToDate, Inc. and/or its affiliates. All rights reserved.    "
No deformities present
Quality 130: Documentation Of Current Medications In The Medical Record: Current Medications Documented
Quality 226: Preventive Care And Screening: Tobacco Use: Screening And Cessation Intervention: Tobacco Screening not Performed
Detail Level: Detailed

## 2024-05-02 NOTE — BH INPATIENT PSYCHIATRY DISCHARGE NOTE - OTHER PAST PSYCHIATRIC HISTORY (INCLUDE DETAILS REGARDING ONSET, COURSE OF ILLNESS, INPATIENT/OUTPATIENT TREATMENT)
Patient is a 48F, single, domiciled and unemployed, non-caregiver, with PPHx bipolar disorder, PTSD, borderline personality disorder and Psychogenic Seizures, multiple prior admissions, last at [facility] 04/04/2022-04/06/2022 due to worsening anxiety, d/c on 3DL s/p did not feel her pain was being properly managed, later became agitated and accused staff of keeping her a prisoner. Per chart, hx of suicide attempt vs gesture in 2014 via superficially cut her neck with glass (which did not require suturing), no hx of illicit substance use, no hx of violence/aggression, no access to firearms, with PMH of endometriosis, chronic pains and ? seizure disorder, who presented to ED with worsening depressed mood and passive SI.     Per PSYCKES report performed in ED, dx include: Borderline Personality Disorder, Unspecified/Other Depressive Disorder, Major Depressive Disorder, Bipolar I, Unspecified/Other Anxiety Disorder, Unspecified/Other Bipolar, Conversion Disorder, PTSD, Schizoaffective Disorder, with multiple inpatient psychiatric admissions and ED visits, last seen at [facility] at 4/25/2022 ED.     Patient seen by Writer at bedside. Patient is calm, cooperative presenting with depressed mood with constricted affect. Writer obtained signed consents for her Mother, Kelsey ([phone]), Psych NP Joelle Hilton ([phone]), Juan Miguel Kidd (care coordinator supervisor for care coordinator Serena Cantor; [phone]). Patient endorses worsening depression due to psychosocial stressors. Patient reports she stopped taking her medication about 2 weeks prior as she felt the meds and her provider were ineffective. Patient reports she is hoping to find the right medication while inpatient. Writer discussed SW role and referral process, as Patient states she has not seen her provider in a month and does not want to continue services with her. Patient agreeable with plan to return home upon discharge.  Writer spoke with care coordinator manager Juan Miguel.

## 2024-05-02 NOTE — BH INPATIENT PSYCHIATRY DISCHARGE NOTE - NSBHMETABOLIC_PSY_ALL_CORE_FT
BMI: BMI (kg/m2): 25.8 (04-25-24 @ 18:12)  HbA1c:   Glucose:   BP: --Vital Signs Last 24 Hrs  T(C): 36.2 (05-02-24 @ 08:11), Max: 36.2 (05-02-24 @ 08:11)  T(F): 97.2 (05-02-24 @ 08:11), Max: 97.2 (05-02-24 @ 08:11)  HR: --  BP: --  BP(mean): --  RR: --  SpO2: --    Orthostatic VS  05-02-24 @ 08:11  Lying BP: --/-- HR: --  Sitting BP: 119/84 HR: 93  Standing BP: 115/77 HR: 97  Site: --  Mode: --  Orthostatic VS  05-01-24 @ 07:22  Lying BP: --/-- HR: --  Sitting BP: 125/70 HR: 97  Standing BP: 115/69 HR: 109  Site: --  Mode: --    Lipid Panel:

## 2024-05-02 NOTE — BH INPATIENT PSYCHIATRY DISCHARGE NOTE - HPI (INCLUDE ILLNESS QUALITY, SEVERITY, DURATION, TIMING, CONTEXT, MODIFYING FACTORS, ASSOCIATED SIGNS AND SYMPTOMS)
Patient is a 48 yr old woman, single, domiciled and unemployed, with past hx of bipolar disorder, PTSD, borderline personality disorder and Psychogenic Seizures, per PSYCKES Borderline Personality Disorder Unspecified/Other Depressive Disorder Major Depressive Disorder Bipolar I Unspecified/Other Anxiety Disorder Unspecified/Other Bipolar Conversion Disorder PTSD Schizoaffective Disorder, with multiple inpatient psychiatric admissions and ED visits, last seen at [facility] at 4/25/2022 ED, including recent hospitalizations 4/28/2004 to 5/28/2004 to [facility] (?) per PSYCKES, 6/20/2021 to 6/25/2021 to [facility] for acute judson, 6/9/2023 to 6/23/2023 to [facility], 4/4/2022 to [facility] due to worsening anxiety and did not feel her pain was being properly managed on the unit, demanding discharge and provided 3DL. Pt became agitated and accused staff of keeping her a prisoner.  Pt later on submitted a 3DL letter requesting release on 4/5/22. Per records hx of suicide attempt vs gesture - superficially cut her neck with glass (which did not require suturing) in 2014. there is no hx of illicit substance.  Pertinent medical issues: endometriosis, chronic pains and ? seizure disorder.  Today, presented to the hospital with worsening depressed mood.      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 Reports Zyprexa for anxiety and then states that she has a history of Bipolar disorder.      Patient states feeling up since receiving dose of Ativan, "fucking magic", earlier today anxious and down, exhibited labile affect through encounter.  Patient reports depressive symptoms including depressed mood, anhedonia, changes in energy/concentration/appetite, sleep disturbances. Patient endorses not sleeping and overeating with weight gain due to medication resulting in hopelessness and poor self image. Pt suicidal thoughts, on and off for a few months, "My life isn't going to get better. I have to end it." Pt clarifies it is herself and her thoughts, no command hallucinations, never audio hallucinations. Was not able to state what prevented her, no specific plan.  When asked about access to means, she laughed and said "How about kitchen knives? Those can do some damage."  States that she was previously diagnosed with Bipolar and stated that "she was definitely manic." First manic event as an adolescent, first psych hospitalization at [facility] as an adolescent. Patient reported manic symptoms including elevated mood, mood lability, hypersexuality, grandiosity, delusions of reference, pressured speech, increase in goal-directed activity, and decreased need for sleep. Patient reports vague symptoms of anxiety.     Pt expresses belief that she knows she's not going to be ok and that nothing will get better after this hospitalization. States that she's going to ask a friend to help her get a job, because she'd like a job, she doesn't think she needs to be on disability, but is afraid of the loss of security for not having disability. Pt doubts this hospitalization can do anything for her other than, "pump her with pills." Patient denies any psychotic symptoms including auditory/visual hallucinations. Patient states that she does not want to kill herself. Denies homicidal ideations, intent or plans.

## 2024-05-02 NOTE — BH INPATIENT PSYCHIATRY DISCHARGE NOTE - HOSPITAL COURSE
Pt seen by this writer on 4/29/24: "Pt assessed for depression and SI. Chart reviewed and case discussed with treatment team. No interval events reported overnight - pt noted to refuse AM dose of gabapentin over the weekend. Pt reports multiple medication trials though she declines to name medications tried. Pt states she is on lamictal for her epilepsy, not for psychiatric reasons. Pt states that she has been seeing NP Joelle Hilton, but does not feel this writer contacting the NP would be of any help, "She doesn't have a sense of what's going on with me." Pt states she was prescribed zyprexa 5 mg PO QHS and self tapered to 2.5 mg PO QHS and ultimately discontinued it 2-3 weeks ago. Pt states that it helped with her mood, but "there's a side effect of weight gain and dry mouth." Pt states that she has had cavities in the past and does not want to have this again with dry mouth. Pt states she did speak with her dentist who gave her ways to mitigate dry mouth. Pt was also educated on making healthy food choices if zyprexa increases her appetite. Pt negativistic and states she is unable to make good choices for herself. Pt did not state that she actually gained weight on the zyprexa, but she stopped it none the less. Discussed risks vs benefits - pt declined to re-start zyprexa despite positive effects on low dosage. Discussed medication alternatives and pt again negativistic and declined medications discussed. Pt noted to not take gabapentin AM doses after taking one dose. Pt states she felt sedated after the one dose. Pt provided with medication education that anything given for anxiety can potentially cause sedation, but if she tries more than one dose, perhaps her body may adjust to it. Pt more amenable to this and agrees to take gabapentin 100 mg PO QD now. Pt is help seeking and rejecting, hopeless, requires a great deal of reassurances. +SI without intent or plan, pt agrees to come to staff immediately with any safety concerns. COVID+ 4/28/24 with re-testing 5/5 and 5/6/24. Pt reports sxs of COVID improving, asked for flonase for post nasal drip - ordered.     PMH: degenerative disc disease, epilepsy, COVID+  Allergies: sudafed (hives), clindamycin, codeine, oxycodone  Substances: denies"    On the unit, pt help seeking and help rejecting, preferring reassurances rather than working on her coping mechanisms, pt observed to deflect any mention of her putting in the work to help with her sxs or enacting the change she verbalizes she wants. Pt initially started on gabapentin 100 mg PO QD and 300 mg PO QHS to help with both chronic pain and anxiety. Pt took one dose of the gabapentin 100 mg and stopped taking it. Pt provided with medication education and eventually gabapentin titrated to 300 mg PO BID. Pt refused any proposed antidepressants, anxiolytics, or antipsychotics. Pt very concerned about weight gain on medications and with body image concerns. Pt negativistic, not believing anything can be changed. Pt reports chronic, baseline SI to not be alive, but consistently denied any intent or plan to self harm. Pt was in good behavioral control  on the unit and did not require IM PRNs, restraints, or seclusion. Pt provided with medication education including, but not limited to, possible side effects like sedation, dizziness, change in liver function levels, weight gain, N/V, GI upset, EPS, TD, NMS, and metabolic changes; pt verbalized understanding. Pt instructed not to drive or use hazardous machinery or materials while on this medication; pt verbalized understanding. On day of discharge, pt denied SIIP, HIIP, A/VH, IOR, paranoia, thought insertion/withdrawal/broadcasting, magical thinking, special abilities, mind reading, [place] preoccupation, sxs of judson, depression, or anxiety. Pt educated on use of 911 in cases of emergency and to go to the nearest ED if feeling unsafe in the community. Pt verbalized understanding. Pt provided with numbers for Suicide Prevention and [facility] Well and educated on their use; pt verbalized understanding. Pt was educated on the adverse effects these medications may have on a fetus and provided with birth control education; pt verbalized understanding    Pt was discharged on: lamictal 150 mg PO BID (pt's outpt meds for reported epilepsy) and gabapentin 300 mg PO BID Pt seen by this writer on 4/29/24: "Pt assessed for depression and SI. Chart reviewed and case discussed with treatment team. No interval events reported overnight - pt noted to refuse AM dose of gabapentin over the weekend. Pt reports multiple medication trials though she declines to name medications tried. Pt states she is on lamictal for her epilepsy, not for psychiatric reasons. Pt states that she has been seeing NP Joelle Hilton, but does not feel this writer contacting the NP would be of any help, "She doesn't have a sense of what's going on with me." Pt states she was prescribed zyprexa 5 mg PO QHS and self tapered to 2.5 mg PO QHS and ultimately discontinued it 2-3 weeks ago. Pt states that it helped with her mood, but "there's a side effect of weight gain and dry mouth." Pt states that she has had cavities in the past and does not want to have this again with dry mouth. Pt states she did speak with her dentist who gave her ways to mitigate dry mouth. Pt was also educated on making healthy food choices if zyprexa increases her appetite. Pt negativistic and states she is unable to make good choices for herself. Pt did not state that she actually gained weight on the zyprexa, but she stopped it none the less. Discussed risks vs benefits - pt declined to re-start zyprexa despite positive effects on low dosage. Discussed medication alternatives and pt again negativistic and declined medications discussed. Pt noted to not take gabapentin AM doses after taking one dose. Pt states she felt sedated after the one dose. Pt provided with medication education that anything given for anxiety can potentially cause sedation, but if she tries more than one dose, perhaps her body may adjust to it. Pt more amenable to this and agrees to take gabapentin 100 mg PO QD now. Pt is help seeking and rejecting, hopeless, requires a great deal of reassurances. +SI without intent or plan, pt agrees to come to staff immediately with any safety concerns. COVID+ 4/28/24 with re-testing 5/5 and 5/6/24. Pt reports sxs of COVID improving, asked for flonase for post nasal drip - ordered.     PMH: degenerative disc disease, epilepsy, COVID+  Allergies: sudafed (hives), clindamycin, codeine, oxycodone  Substances: denies"    On the unit, pt help seeking and help rejecting, preferring reassurances rather than working on her coping mechanisms, pt observed to deflect any mention of her putting in the work to help with her sxs or enacting the change she verbalizes she wants. Pt initially started on gabapentin 100 mg PO QD and 300 mg PO QHS to help with both chronic pain and anxiety. Pt took one dose of the gabapentin 100 mg and stopped taking it. Pt provided with medication education and eventually gabapentin titrated to 300 mg PO BID. Pt then refused midday dose and asked for BID dosing. Gabapentin changed to 400 mg PO BID though pt unable to explain why she did not want a midday dose to help with anxiety when she asks for ativan PRNs. Pt refused any proposed antidepressants, anxiolytics, or antipsychotics. Pt very concerned about weight gain on medications and with body image concerns. Pt refused all SSRIs and refused effexor. Wellbutrin was discussed as pt on anti-seizure medications; provided with a great deal of medication education about this. Pt educated on wellbutrin possibly lowering seizure threshold and pt verbalized understanding. Pt continued to refuse any other antidepressant and agreed to wellbutrin. Wellbutrin 150 mg PO QD started. Pt took one dose and refused the weekend after. When asked if she had a concern or SE, pt stated, "I don't have a good reason." Pt restarted wellbutrin x 2 days prior to discharge and expressed she did not feel it helped and would likely stop using it. Pt negativistic, not believing anything can be changed. Pt reports chronic, baseline SI to not be alive, but consistently denied any intent or plan to self harm. Pt was in good behavioral control  on the unit and did not require IM PRNs, restraints, or seclusion. Pt provided with medication education including, but not limited to, possible side effects like sedation, dizziness, change in liver function levels, weight gain, N/V, GI upset, EPS, TD, NMS, and metabolic changes; pt verbalized understanding. Pt instructed not to drive or use hazardous machinery or materials while on this medication; pt verbalized understanding. On day of discharge, pt denied SIIP, HIIP, A/VH, IOR, paranoia, thought insertion/withdrawal/broadcasting, magical thinking, special abilities, mind reading, Christian preoccupation, sxs of judson, depression, or anxiety. Pt educated on use of 911 in cases of emergency and to go to the nearest ED if feeling unsafe in the community. Pt verbalized understanding. Pt provided with numbers for Suicide Prevention and [facility] Well and educated on their use; pt verbalized understanding. Pt was educated on the adverse effects these medications may have on a fetus and provided with birth control education; pt verbalized understanding    Pt was discharged on: lamictal 150 mg PO BID, wellbutrin 150 mg PO QD, gabapentin 400 mg PO BID, vitamin C 500 mg PO QD, melatonin 3 mg PO QHS, protonix 40 mg PO QD, and senna 2 tabs PO QHS.

## 2024-05-02 NOTE — BH INPATIENT PSYCHIATRY DISCHARGE NOTE - NSDCCPCAREPLAN_GEN_ALL_CORE_FT
PRINCIPAL DISCHARGE DIAGNOSIS  Diagnosis: Bipolar disorder  Assessment and Plan of Treatment:       SECONDARY DISCHARGE DIAGNOSES  Diagnosis: Borderline personality disorder  Assessment and Plan of Treatment:     Diagnosis: Anxiety  Assessment and Plan of Treatment:

## 2024-05-02 NOTE — BH INPATIENT PSYCHIATRY PROGRESS NOTE - CURRENT MEDICATION
MEDICATIONS  (STANDING):  ascorbic acid 500 milliGRAM(s) Oral daily  fluticasone propionate 50 MICROgram(s)/spray Nasal Spray 1 Spray(s) Both Nostrils two times a day  gabapentin 300 milliGRAM(s) Oral at bedtime  gabapentin 300 milliGRAM(s) Oral daily  lamoTRIgine 150 milliGRAM(s) Oral two times a day  pantoprazole    Tablet 40 milliGRAM(s) Oral before breakfast    MEDICATIONS  (PRN):  acetaminophen     Tablet .. 650 milliGRAM(s) Oral every 6 hours PRN Mild Pain (1 - 3), Moderate Pain (4 - 6)  aluminum hydroxide/magnesium hydroxide/simethicone Suspension 30 milliLiter(s) Oral every 6 hours PRN Dyspepsia  benzocaine/menthol Lozenge 1 Lozenge Oral every 4 hours PRN Sore throat/cough  diphenhydrAMINE 50 milliGRAM(s) Oral every 4 hours PRN agitation  diphenhydrAMINE Injectable 50 milliGRAM(s) IntraMuscular once PRN Agitation  guaiFENesin  milliGRAM(s) Oral every 12 hours PRN Chest congestion  lidocaine   4% Patch 1 Patch Transdermal daily PRN back pain  LORazepam     Tablet 0.5 milliGRAM(s) Oral every 6 hours PRN Anxiety  LORazepam     Tablet 2 milliGRAM(s) Oral every 4 hours PRN Agitation  magnesium hydroxide Suspension 30 milliLiter(s) Oral daily PRN Constipation  OLANZapine 5 milliGRAM(s) Oral every 6 hours PRN agitation  OLANZapine Injectable 5 milliGRAM(s) IntraMuscular once PRN severe agitation  sodium chloride 0.65% Nasal 1 Spray(s) Both Nostrils five times a day PRN Nasal Congestion  traZODone 100 milliGRAM(s) Oral at bedtime PRN insomnia   MEDICATIONS  (STANDING):  ascorbic acid 500 milliGRAM(s) Oral daily  fluticasone propionate 50 MICROgram(s)/spray Nasal Spray 1 Spray(s) Both Nostrils two times a day  gabapentin 300 milliGRAM(s) Oral daily  gabapentin 300 milliGRAM(s) Oral at bedtime  lamoTRIgine 150 milliGRAM(s) Oral two times a day  pantoprazole    Tablet 40 milliGRAM(s) Oral before breakfast    MEDICATIONS  (PRN):  acetaminophen     Tablet .. 650 milliGRAM(s) Oral every 6 hours PRN Mild Pain (1 - 3), Moderate Pain (4 - 6)  aluminum hydroxide/magnesium hydroxide/simethicone Suspension 30 milliLiter(s) Oral every 6 hours PRN Dyspepsia  benzocaine/menthol Lozenge 1 Lozenge Oral every 4 hours PRN Sore throat/cough  diphenhydrAMINE 50 milliGRAM(s) Oral every 4 hours PRN agitation  diphenhydrAMINE Injectable 50 milliGRAM(s) IntraMuscular once PRN Agitation  guaiFENesin  milliGRAM(s) Oral every 12 hours PRN Chest congestion  lidocaine   4% Patch 1 Patch Transdermal daily PRN back pain  LORazepam     Tablet 0.5 milliGRAM(s) Oral every 6 hours PRN Anxiety  LORazepam     Tablet 2 milliGRAM(s) Oral every 4 hours PRN Agitation  magnesium hydroxide Suspension 30 milliLiter(s) Oral daily PRN Constipation  OLANZapine 5 milliGRAM(s) Oral every 6 hours PRN agitation  OLANZapine Injectable 5 milliGRAM(s) IntraMuscular once PRN severe agitation  sodium chloride 0.65% Nasal 1 Spray(s) Both Nostrils five times a day PRN Nasal Congestion  traZODone 100 milliGRAM(s) Oral at bedtime PRN insomnia

## 2024-05-02 NOTE — BH INPATIENT PSYCHIATRY DISCHARGE NOTE - NSBHASSESSSUMMFT_PSY_ALL_CORE
Patient is a 48 yr old woman, single, domiciled and unemployed, with past hx of bipolar disorder, PTSD, borderline personality disorder and Psychogenic Seizures, per PSYCKES Borderline Personality Disorder Unspecified/Other Depressive Disorder Major Depressive Disorder Bipolar I Unspecified/Other Anxiety Disorder Unspecified/Other Bipolar Conversion Disorder PTSD Schizoaffective Disorder, with multiple inpatient psychiatric admissions and ED visits, last seen at [facility] at 4/25/2022 ED, including recent hospitalizations 4/28/2004 to 5/28/2004 to [facility] [place] (?) per PSYCKES, 6/20/2021 to 6/25/2021 to [facility] for acute judson, 6/9/2023 to 6/23/2023 to [facility], 4/4/2022 to [facility] due to worsening anxiety and did not feel her pain was being properly managed on the unit, demanding discharge and provided 3DL. Pt became agitated and accused staff of keeping her a prisoner.  Pt later on submitted a 3DL letter requesting release on 4/5/22. Per records hx of suicide attempt vs gesture - superficially cut her neck with glass (which did not require suturing) in 2014. there is no hx of illicit substance.  Pertinent medical issues: endometriosis, chronic pains and ? seizure disorder.  Today, presented to the hospital with worsening depressed mood.    Discharge Progress Note Date and Time: 05-15-24 @ 14:00    Patient is a 48 yr old woman, single, domiciled and unemployed, with past hx of bipolar disorder, PTSD, borderline personality disorder and Psychogenic Seizures, per PSYCKES Borderline Personality Disorder Unspecified/Other Depressive Disorder Major Depressive Disorder Bipolar I Unspecified/Other Anxiety Disorder Unspecified/Other Bipolar Conversion Disorder PTSD Schizoaffective Disorder, with multiple inpatient psychiatric admissions and ED visits, last seen at [facility] at 4/25/2022 ED, including recent hospitalizations 4/28/2004 to 5/28/2004 to [facility] [place] (?) per PSYCKES, 6/20/2021 to 6/25/2021 to [facility] for acute judson, 6/9/2023 to 6/23/2023 to [facility], 4/4/2022 to [facility] due to worsening anxiety and did not feel her pain was being properly managed on the unit, demanding discharge and provided 3DL. Pt became agitated and accused staff of keeping her a prisoner.  Pt later on submitted a 3DL letter requesting release on 4/5/22. Per records hx of suicide attempt vs gesture - superficially cut her neck with glass (which did not require suturing) in 2014. there is no hx of illicit substance.  Pertinent medical issues: endometriosis, chronic pains and ? seizure disorder.  Today, presented to the hospital with worsening depressed mood.      Pt seen with Dr. Jimenez and MARY Townsend present. Pt reports some appropriate anxieties about returning home and able to state that she can reach out to her therapist, use [phone], call 911, or go to the nearest ED if safety concerns should arise in the community. Pt reports chronic, baseline passive thoughts of "not wanting to be around" but adamantly denies any intent or plan to self harm. Pt cites her pets as protective factors and states she has friends she can outreach. Dr. Jimenez spoke to pt about other treatment options including ECT (pt refused here), TMS, ketamine, etc. Pt asked what she wanted her treatment to look like and pt indicated [facility] and her long term therapist. Pt states she is fine with discharging today.

## 2024-05-02 NOTE — BH INPATIENT PSYCHIATRY DISCHARGE NOTE - DESCRIPTION
single, domiciled. unemployed on disability due to depression, multiple hospitalizations ~ 9 mos. 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 has a pet cat named Ainval. no reported access to guns. Graduated high school, Graduated with bachelor's degree in Korean & Linguistics, initially attended [employer], graduated [employer]. Prior homelessness - pt lived with mother for 3 years as an adult following one psych hospitalization, pt briefly homeless, later lived at an adult care facility for ~5 years.

## 2024-05-02 NOTE — BH INPATIENT PSYCHIATRY DISCHARGE NOTE - NSBHDCHANDOFFFT_PSY_ALL_CORE
Encrypted [facility] email sent to Crisis [facility] and [facility] teams including discharge summary, medication list, this writer's contact [phone] for any further questions or concerns
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
## 2024-05-02 NOTE — BH INPATIENT PSYCHIATRY DISCHARGE NOTE - OTHER
improved  chronic baseline passive SI to "not be around" but adamantly denies intent or plan to self harm. Pt able to safety plan and cites her pets as protective factors  "anxious" neutral

## 2024-05-02 NOTE — BH INPATIENT PSYCHIATRY DISCHARGE NOTE - ATTENDING DISCHARGE PHYSICAL EXAMINATION:
Care was discussed and reviewed in the interdisciplinary treatment team.  I, Maryam Hawk MD, have reviewed and verified the documentation.  I independently performed the documented medical decision making.     Patient was seen and evaluated today by this writer, NP and bedside nurse. Patient reported that she is feeling anxious about going home. She express that she wants to go to the AO and work with her therapist. Patient is aware that she can request and assessment for TMS or Ketamine treatment. She can also have ECT outpatient if she changes her arnold. Patient is denying any SI and has been able to contract for safety. Denies any HI or hallucinations and no delusions have been elicited. The patient has been educated about the safety plan and understands that if she feels like harming herself or others, she can call 911 or go to any ER. During the hospital stay, the patient has not demonstrated any aggressive or self-injurious behaviors, and this has been consistent with my observations and the observations of the staff.

## 2024-05-02 NOTE — BH INPATIENT PSYCHIATRY PROGRESS NOTE - ATTENDING COMMENTS
Care was discussed and reviewed in the interdisciplinary treatment team.  I, Maryam Hawk MD, have reviewed and verified the documentation.  I independently performed the documented medical decision making.

## 2024-05-02 NOTE — BH INPATIENT PSYCHIATRY DISCHARGE NOTE - NSDCMRMEDTOKEN_GEN_ALL_CORE_FT
lamoTRIgine 150 mg oral tablet: 1 tab(s) orally 2 times a day  pantoprazole 40 mg oral delayed release tablet: 1 tab(s) orally once a day (before a meal)   ascorbic acid 500 mg oral tablet: 1 tab(s) orally once a day  buPROPion 150 mg/24 hours (XL) oral tablet, extended release: 1 tab(s) orally once a day  gabapentin 400 mg oral capsule: 1 cap(s) orally 2 times a day  lamoTRIgine 150 mg oral tablet: 1 tab(s) orally 2 times a day  melatonin 3 mg oral tablet: 1 tab(s) orally once a day (at bedtime)  pantoprazole 40 mg oral delayed release tablet: 1 tab(s) orally once a day (before a meal)  senna leaf extract oral tablet: 2 tab(s) orally once a day (at bedtime)  traZODone 100 mg oral tablet: 1 tab(s) orally once a day (at bedtime) as needed for insomnia

## 2024-05-02 NOTE — BH INPATIENT PSYCHIATRY PROGRESS NOTE - PRN MEDS
MEDICATIONS  (PRN):  acetaminophen     Tablet .. 650 milliGRAM(s) Oral every 6 hours PRN Mild Pain (1 - 3), Moderate Pain (4 - 6)  aluminum hydroxide/magnesium hydroxide/simethicone Suspension 30 milliLiter(s) Oral every 6 hours PRN Dyspepsia  benzocaine/menthol Lozenge 1 Lozenge Oral every 4 hours PRN Sore throat/cough  diphenhydrAMINE 50 milliGRAM(s) Oral every 4 hours PRN agitation  diphenhydrAMINE Injectable 50 milliGRAM(s) IntraMuscular once PRN Agitation  guaiFENesin  milliGRAM(s) Oral every 12 hours PRN Chest congestion  lidocaine   4% Patch 1 Patch Transdermal daily PRN back pain  LORazepam     Tablet 0.5 milliGRAM(s) Oral every 6 hours PRN Anxiety  LORazepam     Tablet 2 milliGRAM(s) Oral every 4 hours PRN Agitation  magnesium hydroxide Suspension 30 milliLiter(s) Oral daily PRN Constipation  OLANZapine 5 milliGRAM(s) Oral every 6 hours PRN agitation  OLANZapine Injectable 5 milliGRAM(s) IntraMuscular once PRN severe agitation  sodium chloride 0.65% Nasal 1 Spray(s) Both Nostrils five times a day PRN Nasal Congestion  traZODone 100 milliGRAM(s) Oral at bedtime PRN insomnia

## 2024-05-02 NOTE — BH INPATIENT PSYCHIATRY DISCHARGE NOTE - NSBHFUPINTERVALHXFT_PSY_A_CORE
Discharge Progress Note Date and Time: 05-15-24 @ 13:53  Pt assessed for suicidal ideation and depression. Chart reviewed and case discussed with treatment team - no concerns expressed regarding pt discharge today. No interval events reported overnight. Pt seen with Dr. Jimenez and MARY Townsend present. Pt reports some appropriate anxieties about returning home and able to state that she can reach out to her therapist, use [phone], call 911, or go to the nearest ED if safety concerns should arise in the community. Pt reports chronic, baseline passive thoughts of "not wanting to be around" but adamantly denies any intent or plan to self harm. Pt cites her pets as protective factors and states she has friends she can outreach. Dr. Jimenez spoke to pt about other treatment options including ECT (pt refused here), TMS, ketamine, etc. Pt asked what she wanted her treatment to look like and pt indicated [facility] and her long term therapist. Pt states she is fine with discharging today.

## 2024-05-03 PROBLEM — Z87.39 PERSONAL HISTORY OF OTHER DISEASES OF THE MUSCULOSKELETAL SYSTEM AND CONNECTIVE TISSUE: Chronic | Status: ACTIVE | Noted: 2024-04-26

## 2024-05-03 PROBLEM — K21.9 GASTRO-ESOPHAGEAL REFLUX DISEASE WITHOUT ESOPHAGITIS: Chronic | Status: ACTIVE | Noted: 2024-04-26

## 2024-05-03 PROCEDURE — 99233 SBSQ HOSP IP/OBS HIGH 50: CPT | Mod: FS

## 2024-05-03 RX ADMIN — Medication 0.5 MILLIGRAM(S): at 04:52

## 2024-05-03 RX ADMIN — GABAPENTIN 300 MILLIGRAM(S): 400 CAPSULE ORAL at 20:40

## 2024-05-03 RX ADMIN — LAMOTRIGINE 150 MILLIGRAM(S): 25 TABLET, ORALLY DISINTEGRATING ORAL at 21:56

## 2024-05-03 RX ADMIN — LAMOTRIGINE 150 MILLIGRAM(S): 25 TABLET, ORALLY DISINTEGRATING ORAL at 08:33

## 2024-05-03 RX ADMIN — PANTOPRAZOLE SODIUM 40 MILLIGRAM(S): 20 TABLET, DELAYED RELEASE ORAL at 08:33

## 2024-05-03 RX ADMIN — Medication 100 MILLIGRAM(S): at 21:56

## 2024-05-03 RX ADMIN — GABAPENTIN 300 MILLIGRAM(S): 400 CAPSULE ORAL at 08:32

## 2024-05-03 RX ADMIN — Medication 0.5 MILLIGRAM(S): at 11:34

## 2024-05-03 RX ADMIN — Medication 500 MILLIGRAM(S): at 08:32

## 2024-05-03 NOTE — BH INPATIENT PSYCHIATRY PROGRESS NOTE - NSBHFUPINTERVALHXFT_PSY_A_CORE
Pt assessed for depression and SI. Chart reviewed and case discussed with treatment team. No interval events reported overnight. Pt tearful stating she feels embarrassed that she is unable to change her life. Discussed at great length to difference between wanting to change and actions towards making these changes happen. Pt roadblocks herself when problem solving. At one point, pt states, "I can say it, but I won't do it!" When asked why she thinks she won't do things, pt states, "I don't want to!" Pt uses an example of wanting to clean up the mess on her coffee table and her outpt therapist's suggestion that pt get colorful bins to sort her objects into. Pt states she got stuck on how to get these bins and asked her mother for help. Pt encouraged to break down tasks into small, doable steps and given the task to think of three areas of her life that she wants to change and to write down the small steps she can make towards these goals. Pt to work on this over the weekend. Pt immediately intrusive with this writer asking minute questions about this task and how she should go about doing it. Pt noted to obsess over small details like not being able to see what's in the bins. Problem solved that perhaps she could get clear bins and label the bins with the contents. Pt appeared stymied by this and walked away. Pt with continued chronic, passive SI, but denies intent or plan, "I don't think I can even do that!," agrees to come to staff immediately with any safety concerns. Pt help seeking and help rejecting; seeks reassurances rather that implementing her coping skills. No SE reported. COVID+ 4/28/24 (runny nose improving) with re-testing 5/5 and 5/6/24 (ordered). Pt agrees to c/w increase gabapentin to 300 mg PO BID.

## 2024-05-03 NOTE — BH INPATIENT PSYCHIATRY PROGRESS NOTE - CURRENT MEDICATION
MEDICATIONS  (STANDING):  ascorbic acid 500 milliGRAM(s) Oral daily  fluticasone propionate 50 MICROgram(s)/spray Nasal Spray 1 Spray(s) Both Nostrils two times a day  gabapentin 300 milliGRAM(s) Oral daily  gabapentin 300 milliGRAM(s) Oral at bedtime  lamoTRIgine 150 milliGRAM(s) Oral two times a day  pantoprazole    Tablet 40 milliGRAM(s) Oral before breakfast    MEDICATIONS  (PRN):  acetaminophen     Tablet .. 650 milliGRAM(s) Oral every 6 hours PRN Mild Pain (1 - 3), Moderate Pain (4 - 6)  aluminum hydroxide/magnesium hydroxide/simethicone Suspension 30 milliLiter(s) Oral every 6 hours PRN Dyspepsia  benzocaine/menthol Lozenge 1 Lozenge Oral every 4 hours PRN Sore throat/cough  diphenhydrAMINE 50 milliGRAM(s) Oral every 4 hours PRN agitation  diphenhydrAMINE Injectable 50 milliGRAM(s) IntraMuscular once PRN Agitation  guaiFENesin  milliGRAM(s) Oral every 12 hours PRN Chest congestion  lidocaine   4% Patch 1 Patch Transdermal daily PRN back pain  LORazepam     Tablet 0.5 milliGRAM(s) Oral every 6 hours PRN Anxiety  LORazepam     Tablet 2 milliGRAM(s) Oral every 4 hours PRN Agitation  magnesium hydroxide Suspension 30 milliLiter(s) Oral daily PRN Constipation  OLANZapine 5 milliGRAM(s) Oral every 6 hours PRN agitation  OLANZapine Injectable 5 milliGRAM(s) IntraMuscular once PRN severe agitation  sodium chloride 0.65% Nasal 1 Spray(s) Both Nostrils five times a day PRN Nasal Congestion  traZODone 100 milliGRAM(s) Oral at bedtime PRN insomnia

## 2024-05-04 RX ADMIN — LAMOTRIGINE 150 MILLIGRAM(S): 25 TABLET, ORALLY DISINTEGRATING ORAL at 20:07

## 2024-05-04 RX ADMIN — LAMOTRIGINE 150 MILLIGRAM(S): 25 TABLET, ORALLY DISINTEGRATING ORAL at 09:10

## 2024-05-04 RX ADMIN — GABAPENTIN 300 MILLIGRAM(S): 400 CAPSULE ORAL at 09:10

## 2024-05-04 RX ADMIN — GABAPENTIN 300 MILLIGRAM(S): 400 CAPSULE ORAL at 20:07

## 2024-05-04 RX ADMIN — Medication 500 MILLIGRAM(S): at 09:10

## 2024-05-04 RX ADMIN — Medication 650 MILLIGRAM(S): at 18:42

## 2024-05-04 RX ADMIN — Medication 100 MILLIGRAM(S): at 20:07

## 2024-05-04 RX ADMIN — Medication 0.5 MILLIGRAM(S): at 09:53

## 2024-05-04 RX ADMIN — PANTOPRAZOLE SODIUM 40 MILLIGRAM(S): 20 TABLET, DELAYED RELEASE ORAL at 09:10

## 2024-05-05 LAB — SARS-COV-2 RNA SPEC QL NAA+PROBE: DETECTED

## 2024-05-05 RX ADMIN — GABAPENTIN 300 MILLIGRAM(S): 400 CAPSULE ORAL at 20:52

## 2024-05-05 RX ADMIN — GABAPENTIN 300 MILLIGRAM(S): 400 CAPSULE ORAL at 09:14

## 2024-05-05 RX ADMIN — Medication 500 MILLIGRAM(S): at 09:14

## 2024-05-05 RX ADMIN — LAMOTRIGINE 150 MILLIGRAM(S): 25 TABLET, ORALLY DISINTEGRATING ORAL at 09:14

## 2024-05-05 RX ADMIN — LAMOTRIGINE 150 MILLIGRAM(S): 25 TABLET, ORALLY DISINTEGRATING ORAL at 20:52

## 2024-05-05 RX ADMIN — Medication 100 MILLIGRAM(S): at 21:48

## 2024-05-05 RX ADMIN — PANTOPRAZOLE SODIUM 40 MILLIGRAM(S): 20 TABLET, DELAYED RELEASE ORAL at 09:14

## 2024-05-05 RX ADMIN — Medication 0.5 MILLIGRAM(S): at 02:12

## 2024-05-06 PROCEDURE — 99233 SBSQ HOSP IP/OBS HIGH 50: CPT | Mod: FS

## 2024-05-06 RX ORDER — ONDANSETRON 8 MG/1
4 TABLET, FILM COATED ORAL ONCE
Refills: 0 | Status: COMPLETED | OUTPATIENT
Start: 2024-05-06 | End: 2024-05-06

## 2024-05-06 RX ORDER — GABAPENTIN 400 MG/1
300 CAPSULE ORAL THREE TIMES A DAY
Refills: 0 | Status: DISCONTINUED | OUTPATIENT
Start: 2024-05-06 | End: 2024-05-07

## 2024-05-06 RX ORDER — ESCITALOPRAM OXALATE 10 MG/1
5 TABLET, FILM COATED ORAL DAILY
Refills: 0 | Status: DISCONTINUED | OUTPATIENT
Start: 2024-05-07 | End: 2024-05-08

## 2024-05-06 RX ORDER — ESCITALOPRAM OXALATE 10 MG/1
5 TABLET, FILM COATED ORAL DAILY
Refills: 0 | Status: COMPLETED | OUTPATIENT
Start: 2024-05-06 | End: 2024-05-06

## 2024-05-06 RX ADMIN — GABAPENTIN 300 MILLIGRAM(S): 400 CAPSULE ORAL at 12:21

## 2024-05-06 RX ADMIN — GABAPENTIN 300 MILLIGRAM(S): 400 CAPSULE ORAL at 08:39

## 2024-05-06 RX ADMIN — Medication 0.5 MILLIGRAM(S): at 09:00

## 2024-05-06 RX ADMIN — Medication 650 MILLIGRAM(S): at 18:40

## 2024-05-06 RX ADMIN — LAMOTRIGINE 150 MILLIGRAM(S): 25 TABLET, ORALLY DISINTEGRATING ORAL at 08:39

## 2024-05-06 RX ADMIN — PANTOPRAZOLE SODIUM 40 MILLIGRAM(S): 20 TABLET, DELAYED RELEASE ORAL at 07:17

## 2024-05-06 RX ADMIN — ESCITALOPRAM OXALATE 5 MILLIGRAM(S): 10 TABLET, FILM COATED ORAL at 12:20

## 2024-05-06 RX ADMIN — Medication 500 MILLIGRAM(S): at 08:40

## 2024-05-06 RX ADMIN — Medication 100 MILLIGRAM(S): at 21:45

## 2024-05-06 RX ADMIN — ONDANSETRON 4 MILLIGRAM(S): 8 TABLET, FILM COATED ORAL at 12:29

## 2024-05-06 RX ADMIN — LAMOTRIGINE 150 MILLIGRAM(S): 25 TABLET, ORALLY DISINTEGRATING ORAL at 20:42

## 2024-05-06 RX ADMIN — GABAPENTIN 300 MILLIGRAM(S): 400 CAPSULE ORAL at 20:25

## 2024-05-06 NOTE — BH INPATIENT PSYCHIATRY PROGRESS NOTE - ATTENDING COMMENTS
Care was discussed and reviewed in the interdisciplinary treatment team.  I, Maryam Hawk MD, have reviewed and verified the documentation.  I independently performed the documented medical decision making.  I reviewed the chart, discussed case/management of the patient in team and with the NP.  I have reviewed and agree with the above progress note, changes made when indicated.

## 2024-05-06 NOTE — BH INPATIENT PSYCHIATRY PROGRESS NOTE - CURRENT MEDICATION
MEDICATIONS  (STANDING):  ascorbic acid 500 milliGRAM(s) Oral daily  fluticasone propionate 50 MICROgram(s)/spray Nasal Spray 1 Spray(s) Both Nostrils two times a day  gabapentin 300 milliGRAM(s) Oral three times a day  lamoTRIgine 150 milliGRAM(s) Oral two times a day  pantoprazole    Tablet 40 milliGRAM(s) Oral before breakfast    MEDICATIONS  (PRN):  acetaminophen     Tablet .. 650 milliGRAM(s) Oral every 6 hours PRN Mild Pain (1 - 3), Moderate Pain (4 - 6)  aluminum hydroxide/magnesium hydroxide/simethicone Suspension 30 milliLiter(s) Oral every 6 hours PRN Dyspepsia  benzocaine/menthol Lozenge 1 Lozenge Oral every 4 hours PRN Sore throat/cough  diphenhydrAMINE 50 milliGRAM(s) Oral every 4 hours PRN agitation  diphenhydrAMINE Injectable 50 milliGRAM(s) IntraMuscular once PRN Agitation  guaiFENesin  milliGRAM(s) Oral every 12 hours PRN Chest congestion  lidocaine   4% Patch 1 Patch Transdermal daily PRN back pain  LORazepam     Tablet 2 milliGRAM(s) Oral every 4 hours PRN Agitation  LORazepam     Tablet 0.5 milliGRAM(s) Oral every 6 hours PRN Anxiety  magnesium hydroxide Suspension 30 milliLiter(s) Oral daily PRN Constipation  OLANZapine 5 milliGRAM(s) Oral every 6 hours PRN agitation  OLANZapine Injectable 5 milliGRAM(s) IntraMuscular once PRN severe agitation  sodium chloride 0.65% Nasal 1 Spray(s) Both Nostrils five times a day PRN Nasal Congestion  traZODone 100 milliGRAM(s) Oral at bedtime PRN insomnia   MEDICATIONS  (STANDING):  ascorbic acid 500 milliGRAM(s) Oral daily  fluticasone propionate 50 MICROgram(s)/spray Nasal Spray 1 Spray(s) Both Nostrils two times a day  gabapentin 300 milliGRAM(s) Oral three times a day  lamoTRIgine 150 milliGRAM(s) Oral two times a day  pantoprazole    Tablet 40 milliGRAM(s) Oral before breakfast    MEDICATIONS  (PRN):  acetaminophen     Tablet .. 650 milliGRAM(s) Oral every 6 hours PRN Mild Pain (1 - 3), Moderate Pain (4 - 6)  aluminum hydroxide/magnesium hydroxide/simethicone Suspension 30 milliLiter(s) Oral every 6 hours PRN Dyspepsia  benzocaine/menthol Lozenge 1 Lozenge Oral every 4 hours PRN Sore throat/cough  diphenhydrAMINE 50 milliGRAM(s) Oral every 4 hours PRN agitation  diphenhydrAMINE Injectable 50 milliGRAM(s) IntraMuscular once PRN Agitation  guaiFENesin  milliGRAM(s) Oral every 12 hours PRN Chest congestion  lidocaine   4% Patch 1 Patch Transdermal daily PRN back pain  LORazepam     Tablet 2 milliGRAM(s) Oral every 4 hours PRN Agitation  LORazepam     Tablet 0.5 milliGRAM(s) Oral every 8 hours PRN Anxiety  magnesium hydroxide Suspension 30 milliLiter(s) Oral daily PRN Constipation  OLANZapine 5 milliGRAM(s) Oral every 6 hours PRN agitation  OLANZapine Injectable 5 milliGRAM(s) IntraMuscular once PRN severe agitation  sodium chloride 0.65% Nasal 1 Spray(s) Both Nostrils five times a day PRN Nasal Congestion  traZODone 100 milliGRAM(s) Oral at bedtime PRN insomnia

## 2024-05-06 NOTE — BH INPATIENT PSYCHIATRY PROGRESS NOTE - NSBHFUPINTERVALHXFT_PSY_A_CORE
Pt assessed for depression and SI. Chart reviewed and case discussed with treatment team. No interval events reported overnight - pt noted to take ativan PRN often. COVID+ 4/28/24 - Pt re-tested per hospital policy on day #7 and was still COVID+ on 5/5/24. Pt tearful without tears, more irritable, c/o unit being noisy though most pts in their rooms 2/2 COVID, states she was unable to complete assigned task to break down tasks into small, doable steps she can make towards achieving her goals. Pt very negativistic, states there is nothing she can do to change her life but is also unwilling to try even thinking of how to make small changes. Pt with continued chronic, passive SI of "my life is empty," but denies intent or plan, agrees to come to staff immediately with any safety concerns. Pt help seeking and help rejecting; seeks reassurances rather that implementing her coping skills. Pt agrees to start lexapro 5 mg PO QD after expressing ambivalence. Pt provided medication education including but not limited possible side effects like GI upset, sedation, N/V, dizziness, or metabolic changes; pt verbalized understanding. Pt very focused on not gaining weight on medications and making healthy food choices discussed at great length. Pt again repeats, "I can't do that" when given suggestions of what she can eat that is filling but less caloric. Pt also agrees to increase gabapentin to 300 mg PO TID for anxiety since pt is using ativan PRN so often. Pt states she is taking the ativan PRN "to sleep" during the day. Pt encouraged to stay awake during the day. Will decrease ativan to 0.5 mg PO PRN Q 8 hours. No SE reported. Denies HIIP, A/VH, or paranoia

## 2024-05-06 NOTE — BH INPATIENT PSYCHIATRY PROGRESS NOTE - PRN MEDS
MEDICATIONS  (PRN):  acetaminophen     Tablet .. 650 milliGRAM(s) Oral every 6 hours PRN Mild Pain (1 - 3), Moderate Pain (4 - 6)  aluminum hydroxide/magnesium hydroxide/simethicone Suspension 30 milliLiter(s) Oral every 6 hours PRN Dyspepsia  benzocaine/menthol Lozenge 1 Lozenge Oral every 4 hours PRN Sore throat/cough  diphenhydrAMINE 50 milliGRAM(s) Oral every 4 hours PRN agitation  diphenhydrAMINE Injectable 50 milliGRAM(s) IntraMuscular once PRN Agitation  guaiFENesin  milliGRAM(s) Oral every 12 hours PRN Chest congestion  lidocaine   4% Patch 1 Patch Transdermal daily PRN back pain  LORazepam     Tablet 2 milliGRAM(s) Oral every 4 hours PRN Agitation  LORazepam     Tablet 0.5 milliGRAM(s) Oral every 6 hours PRN Anxiety  magnesium hydroxide Suspension 30 milliLiter(s) Oral daily PRN Constipation  OLANZapine 5 milliGRAM(s) Oral every 6 hours PRN agitation  OLANZapine Injectable 5 milliGRAM(s) IntraMuscular once PRN severe agitation  sodium chloride 0.65% Nasal 1 Spray(s) Both Nostrils five times a day PRN Nasal Congestion  traZODone 100 milliGRAM(s) Oral at bedtime PRN insomnia   MEDICATIONS  (PRN):  acetaminophen     Tablet .. 650 milliGRAM(s) Oral every 6 hours PRN Mild Pain (1 - 3), Moderate Pain (4 - 6)  aluminum hydroxide/magnesium hydroxide/simethicone Suspension 30 milliLiter(s) Oral every 6 hours PRN Dyspepsia  benzocaine/menthol Lozenge 1 Lozenge Oral every 4 hours PRN Sore throat/cough  diphenhydrAMINE 50 milliGRAM(s) Oral every 4 hours PRN agitation  diphenhydrAMINE Injectable 50 milliGRAM(s) IntraMuscular once PRN Agitation  guaiFENesin  milliGRAM(s) Oral every 12 hours PRN Chest congestion  lidocaine   4% Patch 1 Patch Transdermal daily PRN back pain  LORazepam     Tablet 2 milliGRAM(s) Oral every 4 hours PRN Agitation  LORazepam     Tablet 0.5 milliGRAM(s) Oral every 8 hours PRN Anxiety  magnesium hydroxide Suspension 30 milliLiter(s) Oral daily PRN Constipation  OLANZapine 5 milliGRAM(s) Oral every 6 hours PRN agitation  OLANZapine Injectable 5 milliGRAM(s) IntraMuscular once PRN severe agitation  sodium chloride 0.65% Nasal 1 Spray(s) Both Nostrils five times a day PRN Nasal Congestion  traZODone 100 milliGRAM(s) Oral at bedtime PRN insomnia

## 2024-05-06 NOTE — BH INPATIENT PSYCHIATRY PROGRESS NOTE - OTHER
chronic passive SI, pt denies intent or plan  irritable, instantly negates any suggestions provided to assist her with her sxs

## 2024-05-07 PROCEDURE — 99232 SBSQ HOSP IP/OBS MODERATE 35: CPT | Mod: FS

## 2024-05-07 RX ORDER — GABAPENTIN 400 MG/1
300 CAPSULE ORAL
Refills: 0 | Status: DISCONTINUED | OUTPATIENT
Start: 2024-05-07 | End: 2024-05-10

## 2024-05-07 RX ADMIN — GABAPENTIN 300 MILLIGRAM(S): 400 CAPSULE ORAL at 20:50

## 2024-05-07 RX ADMIN — ESCITALOPRAM OXALATE 5 MILLIGRAM(S): 10 TABLET, FILM COATED ORAL at 08:31

## 2024-05-07 RX ADMIN — Medication 0.5 MILLIGRAM(S): at 18:17

## 2024-05-07 RX ADMIN — Medication 0.5 MILLIGRAM(S): at 08:59

## 2024-05-07 RX ADMIN — Medication 50 MILLIGRAM(S): at 02:42

## 2024-05-07 RX ADMIN — Medication 100 MILLIGRAM(S): at 21:56

## 2024-05-07 RX ADMIN — GABAPENTIN 300 MILLIGRAM(S): 400 CAPSULE ORAL at 12:04

## 2024-05-07 RX ADMIN — GABAPENTIN 300 MILLIGRAM(S): 400 CAPSULE ORAL at 08:31

## 2024-05-07 RX ADMIN — LAMOTRIGINE 150 MILLIGRAM(S): 25 TABLET, ORALLY DISINTEGRATING ORAL at 08:31

## 2024-05-07 RX ADMIN — PANTOPRAZOLE SODIUM 40 MILLIGRAM(S): 20 TABLET, DELAYED RELEASE ORAL at 08:30

## 2024-05-07 RX ADMIN — LAMOTRIGINE 150 MILLIGRAM(S): 25 TABLET, ORALLY DISINTEGRATING ORAL at 20:50

## 2024-05-07 RX ADMIN — Medication 500 MILLIGRAM(S): at 08:30

## 2024-05-07 NOTE — BH INPATIENT PSYCHIATRY PROGRESS NOTE - NSBHFUPINTERVALHXFT_PSY_A_CORE
Pt assessed for depression and SI. Chart reviewed and case discussed with treatment team. No interval events reported overnight - pt noted to take benadryl PRN at 2:40AM for insomnia. Staff report pt help seeking and rejecting during individual therapy sessions. COVID+ 4/28/24 and 5/5/24 - will need to complete full 10 days of precautions. Pt remains irritable, c/o new roommate praying aloud in the room, very negativistic, reports wanting change but is unwilling to do anything to enact change, pt with multiple reasons to not do something ie: getting new clothes b/c pt c/o wearing the same 3 outfits in the community. Pt denies SIIP and denies intent or plan, "I wouldn't be able to do anything anyway," agrees to come to staff immediately with any safety concerns. Pt seeks reassurances rather that implementing her coping skills. When this is pointed out to the pt, pt visibly becomes irritated and states, "I juts know I won't do it." Encouragement and reality testing provided for an extended period of time. Pt agrees to c/w lexapro 5 mg PO QD; no SE reported. Will decrease ativan to 0.5 mg PO PRN Q 8 hours and d/c ativan 2 mg PO PRN as pt states she takes these to be able to sleep during the day, "You won't give me any thing for sleep right now, will you?" Denies HIIP, A/VH, or paranoia

## 2024-05-07 NOTE — BH INPATIENT PSYCHIATRY PROGRESS NOTE - ATTENDING COMMENTS
I reviewed the chart, discussed case/management of the patient in team and with the NP.  I have reviewed and agree with the above progress note, changes made when indicated.

## 2024-05-07 NOTE — BH INPATIENT PSYCHIATRY PROGRESS NOTE - OTHER
irritable, instantly negates any suggestions provided to assist her with her sxs  chronic passive SI, pt denies intent or plan

## 2024-05-07 NOTE — BH INPATIENT PSYCHIATRY PROGRESS NOTE - CURRENT MEDICATION
MEDICATIONS  (STANDING):  ascorbic acid 500 milliGRAM(s) Oral daily  escitalopram 5 milliGRAM(s) Oral daily  fluticasone propionate 50 MICROgram(s)/spray Nasal Spray 1 Spray(s) Both Nostrils two times a day  gabapentin 300 milliGRAM(s) Oral <User Schedule>  lamoTRIgine 150 milliGRAM(s) Oral two times a day  pantoprazole    Tablet 40 milliGRAM(s) Oral before breakfast    MEDICATIONS  (PRN):  acetaminophen     Tablet .. 650 milliGRAM(s) Oral every 6 hours PRN Mild Pain (1 - 3), Moderate Pain (4 - 6)  aluminum hydroxide/magnesium hydroxide/simethicone Suspension 30 milliLiter(s) Oral every 6 hours PRN Dyspepsia  benzocaine/menthol Lozenge 1 Lozenge Oral every 4 hours PRN Sore throat/cough  diphenhydrAMINE 50 milliGRAM(s) Oral every 4 hours PRN agitation  diphenhydrAMINE Injectable 50 milliGRAM(s) IntraMuscular once PRN Agitation  guaiFENesin  milliGRAM(s) Oral every 12 hours PRN Chest congestion  lidocaine   4% Patch 1 Patch Transdermal daily PRN back pain  LORazepam     Tablet 0.5 milliGRAM(s) Oral every 8 hours PRN Anxiety  magnesium hydroxide Suspension 30 milliLiter(s) Oral daily PRN Constipation  OLANZapine 5 milliGRAM(s) Oral every 6 hours PRN agitation  OLANZapine Injectable 5 milliGRAM(s) IntraMuscular once PRN severe agitation  sodium chloride 0.65% Nasal 1 Spray(s) Both Nostrils five times a day PRN Nasal Congestion  traZODone 100 milliGRAM(s) Oral at bedtime PRN insomnia

## 2024-05-08 PROCEDURE — 99232 SBSQ HOSP IP/OBS MODERATE 35: CPT | Mod: FS

## 2024-05-08 RX ORDER — LANOLIN ALCOHOL/MO/W.PET/CERES
3 CREAM (GRAM) TOPICAL AT BEDTIME
Refills: 0 | Status: DISCONTINUED | OUTPATIENT
Start: 2024-05-08 | End: 2024-05-15

## 2024-05-08 RX ORDER — DIPHENHYDRAMINE HCL 50 MG
50 CAPSULE ORAL AT BEDTIME
Refills: 0 | Status: DISCONTINUED | OUTPATIENT
Start: 2024-05-08 | End: 2024-05-15

## 2024-05-08 RX ORDER — DIPHENHYDRAMINE HCL 50 MG
50 CAPSULE ORAL ONCE
Refills: 0 | Status: COMPLETED | OUTPATIENT
Start: 2024-05-08 | End: 2024-05-08

## 2024-05-08 RX ORDER — ESCITALOPRAM OXALATE 10 MG/1
10 TABLET, FILM COATED ORAL DAILY
Refills: 0 | Status: DISCONTINUED | OUTPATIENT
Start: 2024-05-08 | End: 2024-05-09

## 2024-05-08 RX ADMIN — GABAPENTIN 300 MILLIGRAM(S): 400 CAPSULE ORAL at 16:38

## 2024-05-08 RX ADMIN — GABAPENTIN 300 MILLIGRAM(S): 400 CAPSULE ORAL at 20:50

## 2024-05-08 RX ADMIN — PANTOPRAZOLE SODIUM 40 MILLIGRAM(S): 20 TABLET, DELAYED RELEASE ORAL at 08:56

## 2024-05-08 RX ADMIN — LAMOTRIGINE 150 MILLIGRAM(S): 25 TABLET, ORALLY DISINTEGRATING ORAL at 20:51

## 2024-05-08 RX ADMIN — Medication 3 MILLIGRAM(S): at 22:02

## 2024-05-08 RX ADMIN — Medication 0.5 MILLIGRAM(S): at 08:55

## 2024-05-08 RX ADMIN — ESCITALOPRAM OXALATE 5 MILLIGRAM(S): 10 TABLET, FILM COATED ORAL at 08:55

## 2024-05-08 RX ADMIN — Medication 50 MILLIGRAM(S): at 03:06

## 2024-05-08 RX ADMIN — Medication 100 MILLIGRAM(S): at 21:59

## 2024-05-08 RX ADMIN — Medication 500 MILLIGRAM(S): at 08:56

## 2024-05-08 RX ADMIN — LAMOTRIGINE 150 MILLIGRAM(S): 25 TABLET, ORALLY DISINTEGRATING ORAL at 08:55

## 2024-05-08 RX ADMIN — GABAPENTIN 300 MILLIGRAM(S): 400 CAPSULE ORAL at 08:56

## 2024-05-08 NOTE — BH TREATMENT PLAN - NSPTSTATEDGOAL_PSY_ALL_CORE
Patient seeks psychiatric stabilization to return to the community with appropriate aftercare and supports. 

## 2024-05-08 NOTE — BH TREATMENT PLAN - NSTXDEPRESINTERRN_PSY_ALL_CORE
encourge to verbalize thoughts and feelings to RN during one on one time q shift x 10 minutes  assist in identifying and utilizing coping skills
Pt. encouraged to perform ADL's. Pt. encouraged to attend at least 2 groups despite mood/energy and not to isolate. Pt. encouraged to seek staff support and verbalize negative self talk. Pt. encouraged to utilize effective coping skills to help improve mood.
Pt. encouraged to perform ADL's. Pt. encouraged to attend at least 2 groups despite mood/energy and not to isolate. Pt. encouraged to seek staff support and verbalize negative self talk. Pt. encouraged to utilize effective coping skills to help improve mood.

## 2024-05-08 NOTE — BH TREATMENT PLAN - NSTXCOPEINTERRN_PSY_ALL_CORE
Pt. encouraged to identify 2 coping skills that help them in times of distress and encouraged to utilize those effective coping skills that has helped them in the past. Pt. encouraged to brainstorm more effective coping skills and verbalize with staff.
Pt. encouraged to identify 2 coping skills that help them in times of distress and encouraged to utilize those effective coping skills that has helped them in the past. Pt. encouraged to brainstorm more effective coping skills and verbalize with staff.
encourage to participate in diversional activities.  Assist to list previously successful coping skills.

## 2024-05-08 NOTE — BH TREATMENT PLAN - NSTXDEPRESINTERPR_PSY_ALL_CORE
Pts goal is to identify two to three positive coping skills for increased symptom management.
Pts goal is to identify two to three positive coping skills for increased symptom management.
Over the next seven days Psychiatric Rehabilitation staff will continue to provide encouragement, support, psychotherapy, and psychoeducation to assist the Pt in the progression of her treatment goal and engagement with activities.

## 2024-05-08 NOTE — BH INPATIENT PSYCHIATRY PROGRESS NOTE - PRN MEDS
MEDICATIONS  (PRN):  acetaminophen     Tablet .. 650 milliGRAM(s) Oral every 6 hours PRN Mild Pain (1 - 3), Moderate Pain (4 - 6)  aluminum hydroxide/magnesium hydroxide/simethicone Suspension 30 milliLiter(s) Oral every 6 hours PRN Dyspepsia  benzocaine/menthol Lozenge 1 Lozenge Oral every 4 hours PRN Sore throat/cough  diphenhydrAMINE 50 milliGRAM(s) Oral every 4 hours PRN agitation  diphenhydrAMINE 50 milliGRAM(s) Oral at bedtime PRN insomnia  diphenhydrAMINE Injectable 50 milliGRAM(s) IntraMuscular once PRN Agitation  guaiFENesin  milliGRAM(s) Oral every 12 hours PRN Chest congestion  lidocaine   4% Patch 1 Patch Transdermal daily PRN back pain  LORazepam     Tablet 0.5 milliGRAM(s) Oral every 8 hours PRN Anxiety  magnesium hydroxide Suspension 30 milliLiter(s) Oral daily PRN Constipation  OLANZapine 5 milliGRAM(s) Oral every 6 hours PRN agitation  OLANZapine Injectable 5 milliGRAM(s) IntraMuscular once PRN severe agitation  sodium chloride 0.65% Nasal 1 Spray(s) Both Nostrils five times a day PRN Nasal Congestion  traZODone 100 milliGRAM(s) Oral at bedtime PRN insomnia

## 2024-05-08 NOTE — BH INPATIENT PSYCHIATRY PROGRESS NOTE - NSBHFUPINTERVALHXFT_PSY_A_CORE
Pt assessed for depression and SI. Chart reviewed and case discussed with treatment team. No interval events reported overnight - pt noted to take trazodone PRN for insomnia. Pt states she is frustrated that she wakes at 2AM and unable to go back to sleep. Pt states this started prior to admission. Sleep hygiene discussed and pt became visibly irritated when asked if she naps during the day (pt has been noted to be in bed during the day) and denies doing this. Alternative medications discussed and pt noted to immediately reject suggestions. Pt finally agreed to start melatonin and asked that benadryl indication include insomnia though she complains the benadryl makes her mouth dry and causes her to have cavities. COVID+ 4/28/24 and 5/5/24 - will need to complete full 10 days of precautions. Pt remains irritable, c/o new roommate, very negativistic, reports wanting change but is unwilling to do anything to enact change, pt with multiple reasons to not do something, reports passive SI yesterday of "It would be nice not to be alive" but denies any intent or plan, agrees to come to staff immediately with any safety concerns. Pt seeks reassurances rather that implementing her coping skills. Pt visibly irritated and gestured to grab her hair when a peer walked by and their rolling walker made a noise. Pt agrees that the peer was not doing this on purpose, but she states she is very sensitive to noises. Discusses radical acceptance at great length, but pt is resistant to implementing the skill though she states she understands the concept. Pt agrees to increase lexapro to 10 mg PO QD; no SE reported, pt reports increasing anxiety. Denies HIIP, A/VH, or paranoia

## 2024-05-08 NOTE — BH TREATMENT PLAN - NSTXCOPEINTERMD_PSY_ALL_CORE
med management/ group, milieu therapy

## 2024-05-08 NOTE — BH TREATMENT PLAN - NSTXDCOPNOINTERSW_PSY_ALL_CORE
SW will meet with pt to provide psychoed and support towards pt's goal. SW will explore pt's lapse in care and create consistent outpt goals. SW meet with multidisciplinary team daily for updates on pt's goal progress.

## 2024-05-08 NOTE — BH INPATIENT PSYCHIATRY PROGRESS NOTE - CURRENT MEDICATION
MEDICATIONS  (STANDING):  ascorbic acid 500 milliGRAM(s) Oral daily  escitalopram 10 milliGRAM(s) Oral daily  fluticasone propionate 50 MICROgram(s)/spray Nasal Spray 1 Spray(s) Both Nostrils two times a day  gabapentin 300 milliGRAM(s) Oral <User Schedule>  lamoTRIgine 150 milliGRAM(s) Oral two times a day  melatonin. 3 milliGRAM(s) Oral at bedtime  pantoprazole    Tablet 40 milliGRAM(s) Oral before breakfast    MEDICATIONS  (PRN):  acetaminophen     Tablet .. 650 milliGRAM(s) Oral every 6 hours PRN Mild Pain (1 - 3), Moderate Pain (4 - 6)  aluminum hydroxide/magnesium hydroxide/simethicone Suspension 30 milliLiter(s) Oral every 6 hours PRN Dyspepsia  benzocaine/menthol Lozenge 1 Lozenge Oral every 4 hours PRN Sore throat/cough  diphenhydrAMINE 50 milliGRAM(s) Oral every 4 hours PRN agitation  diphenhydrAMINE 50 milliGRAM(s) Oral at bedtime PRN insomnia  diphenhydrAMINE Injectable 50 milliGRAM(s) IntraMuscular once PRN Agitation  guaiFENesin  milliGRAM(s) Oral every 12 hours PRN Chest congestion  lidocaine   4% Patch 1 Patch Transdermal daily PRN back pain  LORazepam     Tablet 0.5 milliGRAM(s) Oral every 8 hours PRN Anxiety  magnesium hydroxide Suspension 30 milliLiter(s) Oral daily PRN Constipation  OLANZapine 5 milliGRAM(s) Oral every 6 hours PRN agitation  OLANZapine Injectable 5 milliGRAM(s) IntraMuscular once PRN severe agitation  sodium chloride 0.65% Nasal 1 Spray(s) Both Nostrils five times a day PRN Nasal Congestion  traZODone 100 milliGRAM(s) Oral at bedtime PRN insomnia

## 2024-05-08 NOTE — BH TREATMENT PLAN - NSTXPLANTHERAPYSESSIONSFT_PSY_ALL_CORE
05-02-24  Type of therapy: Creative arts therapy, Leisure development, Mindfulness, Peer advocate, Psychoeducation  Type of session: Individual  Level of patient participation: Attentive, Engaged  Duration of participation: 30 minutes  Therapy conducted by: Psych rehab  Therapy Summary: Writer met with Pt to assess her progress towards psychiatric rehabilitation goal over the past week. Pt denies SI/HI/AH/VH. Pt has been complaint with meds. Over the past week, Pt made minimal progress towards Psych Rehab goal of psychotic symptoms. Pt identified attending DBT or talking as positive coping skills. Pt attended 58% groups. Pt participates with encouragement.  Pt is visible in the unit. Pt often seeks out for staff. Over the next seven days Psychiatric Rehabilitation staff will continue to provide encouragement, support, psychotherapy, and psychoeducation to assist the Pt in the progression of her treatment goal and engagement with activities.

## 2024-05-09 PROCEDURE — 99232 SBSQ HOSP IP/OBS MODERATE 35: CPT | Mod: FS

## 2024-05-09 RX ORDER — BUPROPION HYDROCHLORIDE 150 MG/1
150 TABLET, EXTENDED RELEASE ORAL DAILY
Refills: 0 | Status: DISCONTINUED | OUTPATIENT
Start: 2024-05-10 | End: 2024-05-15

## 2024-05-09 RX ADMIN — Medication 50 MILLIGRAM(S): at 01:40

## 2024-05-09 RX ADMIN — LAMOTRIGINE 150 MILLIGRAM(S): 25 TABLET, ORALLY DISINTEGRATING ORAL at 20:20

## 2024-05-09 RX ADMIN — GABAPENTIN 300 MILLIGRAM(S): 400 CAPSULE ORAL at 13:26

## 2024-05-09 RX ADMIN — PANTOPRAZOLE SODIUM 40 MILLIGRAM(S): 20 TABLET, DELAYED RELEASE ORAL at 08:31

## 2024-05-09 RX ADMIN — Medication 0.5 MILLIGRAM(S): at 23:01

## 2024-05-09 RX ADMIN — ESCITALOPRAM OXALATE 10 MILLIGRAM(S): 10 TABLET, FILM COATED ORAL at 08:30

## 2024-05-09 RX ADMIN — GABAPENTIN 300 MILLIGRAM(S): 400 CAPSULE ORAL at 08:31

## 2024-05-09 RX ADMIN — Medication 500 MILLIGRAM(S): at 08:31

## 2024-05-09 RX ADMIN — Medication 100 MILLIGRAM(S): at 22:16

## 2024-05-09 RX ADMIN — LAMOTRIGINE 150 MILLIGRAM(S): 25 TABLET, ORALLY DISINTEGRATING ORAL at 08:30

## 2024-05-09 RX ADMIN — GABAPENTIN 300 MILLIGRAM(S): 400 CAPSULE ORAL at 20:19

## 2024-05-09 NOTE — CHART NOTE - NSCHARTNOTEFT_GEN_A_CORE
Nutrition reconsulted for complains of menu/food preferences. Patient is a 48 y/o female with PPHx of bipolar disorder, PTSD, borderline personality disorder and Psychogenic Seizures, MDD, PTSD, Schizoaffective Disorder, with multiple inpatient psychiatric admissions and ED visits, last seen at [facility] at 4/25/2022 ED, PMHx of endometriosis, chronic pains, DDD, high cholesterol, and seizure disorder. Presented to the hospital with worsening depressed mood.    Met with patient in her room today who appeared anxious and irritable but able to engage in the interview. Patient reported her appetite remains good, stated that she does not have any complains of her diet and has been receiving food items she previously requested, "I don't understanding why you're here." Later, patient became more open to verbalize her food preferences -- she wants only 1 PBJ sandwich instead of 2, and mustard & tovar packets added to her dinner as she sometimes would have a sandwich in the evening. Otherwise, patient would likes to c/w grilled chicken salad, fresh fruit platter, and cottage cheese as previously ordered. No reports of chewing/swallowing difficulties on current diet. No reports of GI distress (nausea/vomiting/diarrhea/ constipation) at this time.      Diet : Diet, Regular (04-25-24 @ 16:13) [Active]    Patient reports [ ] nausea  [ ] vomiting [ ] diarrhea [ ] constipation  [ ] chewing problems [ ] swallowing issues  [X] other: Pt denies GI distress at this time    PO intake:  [ ] < 50%  [ ] 50-75%  [X] %  [ ] other :    Height (cm): 170.2 (04-25-24)  Weight (kg): 74.8 (04-25-24)  BMI (kg/m2): 25.8 (04-25-24)  -- no new wt to reassess     Skin: no pressure injuries     Edema: no edema    Pertinent Medications: MEDICATIONS  (STANDING):  ascorbic acid 500 milliGRAM(s) Oral daily  fluticasone propionate 50 MICROgram(s)/spray Nasal Spray 1 Spray(s) Both Nostrils two times a day  gabapentin 300 milliGRAM(s) Oral <User Schedule>  lamoTRIgine 150 milliGRAM(s) Oral two times a day  melatonin. 3 milliGRAM(s) Oral at bedtime  pantoprazole    Tablet 40 milliGRAM(s) Oral before breakfast    MEDICATIONS  (PRN):  acetaminophen     Tablet .. 650 milliGRAM(s) Oral every 6 hours PRN Mild Pain (1 - 3), Moderate Pain (4 - 6)  aluminum hydroxide/magnesium hydroxide/simethicone Suspension 30 milliLiter(s) Oral every 6 hours PRN Dyspepsia  benzocaine/menthol Lozenge 1 Lozenge Oral every 4 hours PRN Sore throat/cough  diphenhydrAMINE 50 milliGRAM(s) Oral at bedtime PRN insomnia  diphenhydrAMINE 50 milliGRAM(s) Oral every 4 hours PRN agitation  diphenhydrAMINE Injectable 50 milliGRAM(s) IntraMuscular once PRN Agitation  guaiFENesin  milliGRAM(s) Oral every 12 hours PRN Chest congestion  lidocaine   4% Patch 1 Patch Transdermal daily PRN back pain  LORazepam     Tablet 0.5 milliGRAM(s) Oral every 8 hours PRN Anxiety  magnesium hydroxide Suspension 30 milliLiter(s) Oral daily PRN Constipation  OLANZapine 5 milliGRAM(s) Oral every 6 hours PRN agitation  OLANZapine Injectable 5 milliGRAM(s) IntraMuscular once PRN severe agitation  sodium chloride 0.65% Nasal 1 Spray(s) Both Nostrils five times a day PRN Nasal Congestion  traZODone 100 milliGRAM(s) Oral at bedtime PRN insomnia    Pertinent Labs:  no new chem labs to reassess        Estimated Needs:   [X] no change since previous assessment  [ ] recalculated:       Previous Nutrition Diagnosis: Food & Nutrition Related Knowledge Deficit    Nutrition Diagnosis is [X] ongoing  [ ] resolved [ ] not applicable        New Nutrition Diagnosis: [X] not applicable          Recommendations:  1. Continue current diet order, which remains appropriate at this time.   2. Nursing to assist patient with filling out special meal request form on the unit.   3. Monitor PO intake/tolerance, weights, labs, BM's, and skin integrity.    -- Zurdo Nieto, MS, RDN, CDN, Pager # [phone]

## 2024-05-09 NOTE — BH INPATIENT PSYCHIATRY PROGRESS NOTE - NSBHFUPINTERVALHXFT_PSY_A_CORE
Pt assessed for depression and SI. Chart reviewed and case discussed with treatment team. No interval events reported overnight - pt noted to take benadryl PRN for insomnia. Pt noted to be more irritable with lexapro and when asked about it, pt states she didn't notice this, but on reflection reports she has been feeling more angry with start of lexapro. Alternative medications discussed and pt agreed to start a trial of wellbutrin 150 mg PO QD and d/c of lexapro. Pt provided with medication education including but not limited to possible side effects like anxiety, N/V, GI upset, sedation, and metabolic changes; pt verbalized understanding. COVID+ 4/28/24 and 5/5/24 pt completed 10 days of COVID precautions. Pt remains irritable, negativistic, more willing to use coping skills and noted to ask rehab therapy for assistance, reports passive SI but denies any intent or plan, agrees to come to staff immediately with any safety concerns. Denies HIIP, A/VH, or paranoia

## 2024-05-09 NOTE — BH INPATIENT PSYCHIATRY PROGRESS NOTE - ATTENDING COMMENTS
I reviewed the chart, discussed case/management of the patient in team and with the NP.  I have reviewed and agree with the above progress note, changes made when indicated. Care was discussed and reviewed in the interdisciplinary treatment team.  I, Maryam Hawk MD, have reviewed and verified the documentation.  I independently performed the documented medical decision making.

## 2024-05-09 NOTE — BH INPATIENT PSYCHIATRY PROGRESS NOTE - CURRENT MEDICATION
MEDICATIONS  (STANDING):  ascorbic acid 500 milliGRAM(s) Oral daily  fluticasone propionate 50 MICROgram(s)/spray Nasal Spray 1 Spray(s) Both Nostrils two times a day  gabapentin 300 milliGRAM(s) Oral <User Schedule>  lamoTRIgine 150 milliGRAM(s) Oral two times a day  melatonin. 3 milliGRAM(s) Oral at bedtime  pantoprazole    Tablet 40 milliGRAM(s) Oral before breakfast    MEDICATIONS  (PRN):  acetaminophen     Tablet .. 650 milliGRAM(s) Oral every 6 hours PRN Mild Pain (1 - 3), Moderate Pain (4 - 6)  aluminum hydroxide/magnesium hydroxide/simethicone Suspension 30 milliLiter(s) Oral every 6 hours PRN Dyspepsia  benzocaine/menthol Lozenge 1 Lozenge Oral every 4 hours PRN Sore throat/cough  diphenhydrAMINE 50 milliGRAM(s) Oral every 4 hours PRN agitation  diphenhydrAMINE 50 milliGRAM(s) Oral at bedtime PRN insomnia  diphenhydrAMINE Injectable 50 milliGRAM(s) IntraMuscular once PRN Agitation  guaiFENesin  milliGRAM(s) Oral every 12 hours PRN Chest congestion  lidocaine   4% Patch 1 Patch Transdermal daily PRN back pain  LORazepam     Tablet 0.5 milliGRAM(s) Oral every 8 hours PRN Anxiety  magnesium hydroxide Suspension 30 milliLiter(s) Oral daily PRN Constipation  OLANZapine 5 milliGRAM(s) Oral every 6 hours PRN agitation  OLANZapine Injectable 5 milliGRAM(s) IntraMuscular once PRN severe agitation  sodium chloride 0.65% Nasal 1 Spray(s) Both Nostrils five times a day PRN Nasal Congestion  traZODone 100 milliGRAM(s) Oral at bedtime PRN insomnia

## 2024-05-10 PROCEDURE — 99232 SBSQ HOSP IP/OBS MODERATE 35: CPT | Mod: FS

## 2024-05-10 RX ORDER — POLYETHYLENE GLYCOL 3350 17 G/17G
17 POWDER, FOR SOLUTION ORAL DAILY
Refills: 0 | Status: DISCONTINUED | OUTPATIENT
Start: 2024-05-10 | End: 2024-05-15

## 2024-05-10 RX ORDER — GABAPENTIN 400 MG/1
450 CAPSULE ORAL
Refills: 0 | Status: DISCONTINUED | OUTPATIENT
Start: 2024-05-10 | End: 2024-05-10

## 2024-05-10 RX ORDER — SENNA PLUS 8.6 MG/1
2 TABLET ORAL AT BEDTIME
Refills: 0 | Status: DISCONTINUED | OUTPATIENT
Start: 2024-05-10 | End: 2024-05-15

## 2024-05-10 RX ORDER — GABAPENTIN 400 MG/1
400 CAPSULE ORAL
Refills: 0 | Status: DISCONTINUED | OUTPATIENT
Start: 2024-05-10 | End: 2024-05-15

## 2024-05-10 RX ORDER — HYDROXYZINE HCL 10 MG
50 TABLET ORAL EVERY 6 HOURS
Refills: 0 | Status: DISCONTINUED | OUTPATIENT
Start: 2024-05-10 | End: 2024-05-15

## 2024-05-10 RX ADMIN — MAGNESIUM HYDROXIDE 30 MILLILITER(S): 400 TABLET, CHEWABLE ORAL at 09:23

## 2024-05-10 RX ADMIN — Medication 50 MILLIGRAM(S): at 01:36

## 2024-05-10 RX ADMIN — Medication 100 MILLIGRAM(S): at 22:06

## 2024-05-10 RX ADMIN — LAMOTRIGINE 150 MILLIGRAM(S): 25 TABLET, ORALLY DISINTEGRATING ORAL at 08:19

## 2024-05-10 RX ADMIN — GABAPENTIN 400 MILLIGRAM(S): 400 CAPSULE ORAL at 20:27

## 2024-05-10 RX ADMIN — GABAPENTIN 300 MILLIGRAM(S): 400 CAPSULE ORAL at 08:19

## 2024-05-10 RX ADMIN — PANTOPRAZOLE SODIUM 40 MILLIGRAM(S): 20 TABLET, DELAYED RELEASE ORAL at 08:19

## 2024-05-10 RX ADMIN — LAMOTRIGINE 150 MILLIGRAM(S): 25 TABLET, ORALLY DISINTEGRATING ORAL at 20:28

## 2024-05-10 RX ADMIN — Medication 0.5 MILLIGRAM(S): at 14:23

## 2024-05-10 RX ADMIN — Medication 500 MILLIGRAM(S): at 08:19

## 2024-05-10 RX ADMIN — BUPROPION HYDROCHLORIDE 150 MILLIGRAM(S): 150 TABLET, EXTENDED RELEASE ORAL at 08:19

## 2024-05-10 NOTE — BH INPATIENT PSYCHIATRY PROGRESS NOTE - CURRENT MEDICATION
MEDICATIONS  (STANDING):  ascorbic acid 500 milliGRAM(s) Oral daily  buPROPion XL (24-Hour) 150 milliGRAM(s) Oral daily  fluticasone propionate 50 MICROgram(s)/spray Nasal Spray 1 Spray(s) Both Nostrils two times a day  gabapentin 300 milliGRAM(s) Oral <User Schedule>  lamoTRIgine 150 milliGRAM(s) Oral two times a day  melatonin. 3 milliGRAM(s) Oral at bedtime  pantoprazole    Tablet 40 milliGRAM(s) Oral before breakfast    MEDICATIONS  (PRN):  acetaminophen     Tablet .. 650 milliGRAM(s) Oral every 6 hours PRN Mild Pain (1 - 3), Moderate Pain (4 - 6)  aluminum hydroxide/magnesium hydroxide/simethicone Suspension 30 milliLiter(s) Oral every 6 hours PRN Dyspepsia  benzocaine/menthol Lozenge 1 Lozenge Oral every 4 hours PRN Sore throat/cough  diphenhydrAMINE 50 milliGRAM(s) Oral at bedtime PRN insomnia  diphenhydrAMINE 50 milliGRAM(s) Oral every 4 hours PRN agitation  diphenhydrAMINE Injectable 50 milliGRAM(s) IntraMuscular once PRN Agitation  guaiFENesin  milliGRAM(s) Oral every 12 hours PRN Chest congestion  hydrOXYzine hydrochloride 50 milliGRAM(s) Oral every 6 hours PRN anxiety  lidocaine   4% Patch 1 Patch Transdermal daily PRN back pain  LORazepam     Tablet 0.5 milliGRAM(s) Oral every 8 hours PRN Anxiety  magnesium hydroxide Suspension 30 milliLiter(s) Oral daily PRN Constipation  OLANZapine 5 milliGRAM(s) Oral every 6 hours PRN agitation  OLANZapine Injectable 5 milliGRAM(s) IntraMuscular once PRN severe agitation  sodium chloride 0.65% Nasal 1 Spray(s) Both Nostrils five times a day PRN Nasal Congestion  traZODone 100 milliGRAM(s) Oral at bedtime PRN insomnia   MEDICATIONS  (STANDING):  ascorbic acid 500 milliGRAM(s) Oral daily  buPROPion XL (24-Hour) 150 milliGRAM(s) Oral daily  fluticasone propionate 50 MICROgram(s)/spray Nasal Spray 1 Spray(s) Both Nostrils two times a day  gabapentin 400 milliGRAM(s) Oral two times a day  lamoTRIgine 150 milliGRAM(s) Oral two times a day  melatonin. 3 milliGRAM(s) Oral at bedtime  pantoprazole    Tablet 40 milliGRAM(s) Oral before breakfast  polyethylene glycol 3350 17 Gram(s) Oral daily  senna 2 Tablet(s) Oral at bedtime    MEDICATIONS  (PRN):  acetaminophen     Tablet .. 650 milliGRAM(s) Oral every 6 hours PRN Mild Pain (1 - 3), Moderate Pain (4 - 6)  aluminum hydroxide/magnesium hydroxide/simethicone Suspension 30 milliLiter(s) Oral every 6 hours PRN Dyspepsia  benzocaine/menthol Lozenge 1 Lozenge Oral every 4 hours PRN Sore throat/cough  diphenhydrAMINE 50 milliGRAM(s) Oral at bedtime PRN insomnia  diphenhydrAMINE 50 milliGRAM(s) Oral every 4 hours PRN agitation  diphenhydrAMINE Injectable 50 milliGRAM(s) IntraMuscular once PRN Agitation  guaiFENesin  milliGRAM(s) Oral every 12 hours PRN Chest congestion  hydrOXYzine hydrochloride 50 milliGRAM(s) Oral every 6 hours PRN anxiety  lidocaine   4% Patch 1 Patch Transdermal daily PRN back pain  LORazepam     Tablet 0.5 milliGRAM(s) Oral every 8 hours PRN Anxiety  magnesium hydroxide Suspension 30 milliLiter(s) Oral daily PRN Constipation  OLANZapine 5 milliGRAM(s) Oral every 6 hours PRN agitation  OLANZapine Injectable 5 milliGRAM(s) IntraMuscular once PRN severe agitation  sodium chloride 0.65% Nasal 1 Spray(s) Both Nostrils five times a day PRN Nasal Congestion  traZODone 100 milliGRAM(s) Oral at bedtime PRN insomnia

## 2024-05-10 NOTE — BH INPATIENT PSYCHIATRY PROGRESS NOTE - NSBHFUPINTERVALHXFT_PSY_A_CORE
Pt assessed for depression and SI. Chart reviewed and case discussed with treatment team. No interval events reported overnight. On approach, pt appears less  irritable with d/c of lexapro; start wellbutrin 150 mg PO QD today. COVID+ 4/28/24 and 5/5/24 pt completed 10 days of COVID precautions. Pt states she is constipated, but having small BMs. Will start senna 2 tabs PO QHS and miralax 17 g PO QD. Pt asks about insomnia sxs and agreed to try atarax 50 mg PO PRN Q 6 hours and she feels benadryl is contributing to her constipation. Pt reports possible PNES episode yesterday while speaking with the nutritionist, "Her badge got blurry for a few minutes." Pt states she did not lose consciousness nor was she incontinent. Pt remains negativistic, but more willing to use coping skills and provided with positive reinforcement for this, reports passive SI but denies any intent or plan, agrees to come to staff immediately with any safety concerns. Denies HIIP, A/VH, or paranoia. Projected discharge next week

## 2024-05-10 NOTE — BH INPATIENT PSYCHIATRY PROGRESS NOTE - PRN MEDS
MEDICATIONS  (PRN):  acetaminophen     Tablet .. 650 milliGRAM(s) Oral every 6 hours PRN Mild Pain (1 - 3), Moderate Pain (4 - 6)  aluminum hydroxide/magnesium hydroxide/simethicone Suspension 30 milliLiter(s) Oral every 6 hours PRN Dyspepsia  benzocaine/menthol Lozenge 1 Lozenge Oral every 4 hours PRN Sore throat/cough  diphenhydrAMINE 50 milliGRAM(s) Oral at bedtime PRN insomnia  diphenhydrAMINE 50 milliGRAM(s) Oral every 4 hours PRN agitation  diphenhydrAMINE Injectable 50 milliGRAM(s) IntraMuscular once PRN Agitation  guaiFENesin  milliGRAM(s) Oral every 12 hours PRN Chest congestion  hydrOXYzine hydrochloride 50 milliGRAM(s) Oral every 6 hours PRN anxiety  lidocaine   4% Patch 1 Patch Transdermal daily PRN back pain  LORazepam     Tablet 0.5 milliGRAM(s) Oral every 8 hours PRN Anxiety  magnesium hydroxide Suspension 30 milliLiter(s) Oral daily PRN Constipation  OLANZapine 5 milliGRAM(s) Oral every 6 hours PRN agitation  OLANZapine Injectable 5 milliGRAM(s) IntraMuscular once PRN severe agitation  sodium chloride 0.65% Nasal 1 Spray(s) Both Nostrils five times a day PRN Nasal Congestion  traZODone 100 milliGRAM(s) Oral at bedtime PRN insomnia

## 2024-05-11 RX ADMIN — LAMOTRIGINE 150 MILLIGRAM(S): 25 TABLET, ORALLY DISINTEGRATING ORAL at 08:53

## 2024-05-11 RX ADMIN — LAMOTRIGINE 150 MILLIGRAM(S): 25 TABLET, ORALLY DISINTEGRATING ORAL at 21:13

## 2024-05-11 RX ADMIN — PANTOPRAZOLE SODIUM 40 MILLIGRAM(S): 20 TABLET, DELAYED RELEASE ORAL at 08:53

## 2024-05-11 RX ADMIN — Medication 100 MILLIGRAM(S): at 22:32

## 2024-05-11 RX ADMIN — SENNA PLUS 2 TABLET(S): 8.6 TABLET ORAL at 21:12

## 2024-05-11 RX ADMIN — Medication 50 MILLIGRAM(S): at 00:45

## 2024-05-11 RX ADMIN — GABAPENTIN 400 MILLIGRAM(S): 400 CAPSULE ORAL at 21:13

## 2024-05-11 RX ADMIN — GABAPENTIN 400 MILLIGRAM(S): 400 CAPSULE ORAL at 08:53

## 2024-05-11 RX ADMIN — Medication 500 MILLIGRAM(S): at 08:53

## 2024-05-12 RX ADMIN — Medication 1 SPRAY(S): at 21:49

## 2024-05-12 RX ADMIN — Medication 100 MILLIGRAM(S): at 22:30

## 2024-05-12 RX ADMIN — Medication 650 MILLIGRAM(S): at 20:45

## 2024-05-12 RX ADMIN — Medication 650 MILLIGRAM(S): at 19:40

## 2024-05-12 RX ADMIN — LAMOTRIGINE 150 MILLIGRAM(S): 25 TABLET, ORALLY DISINTEGRATING ORAL at 08:18

## 2024-05-12 RX ADMIN — Medication 3 MILLIGRAM(S): at 21:10

## 2024-05-12 RX ADMIN — Medication 50 MILLIGRAM(S): at 03:44

## 2024-05-12 RX ADMIN — GABAPENTIN 400 MILLIGRAM(S): 400 CAPSULE ORAL at 08:19

## 2024-05-12 RX ADMIN — Medication 500 MILLIGRAM(S): at 08:18

## 2024-05-12 RX ADMIN — GABAPENTIN 400 MILLIGRAM(S): 400 CAPSULE ORAL at 21:10

## 2024-05-12 RX ADMIN — SENNA PLUS 2 TABLET(S): 8.6 TABLET ORAL at 21:11

## 2024-05-12 RX ADMIN — PANTOPRAZOLE SODIUM 40 MILLIGRAM(S): 20 TABLET, DELAYED RELEASE ORAL at 08:19

## 2024-05-12 RX ADMIN — Medication 1 SPRAY(S): at 21:52

## 2024-05-12 RX ADMIN — LAMOTRIGINE 150 MILLIGRAM(S): 25 TABLET, ORALLY DISINTEGRATING ORAL at 21:10

## 2024-05-13 PROCEDURE — 99232 SBSQ HOSP IP/OBS MODERATE 35: CPT | Mod: FS

## 2024-05-13 RX ADMIN — Medication 50 MILLIGRAM(S): at 00:11

## 2024-05-13 RX ADMIN — Medication 100 MILLIGRAM(S): at 22:13

## 2024-05-13 RX ADMIN — LAMOTRIGINE 150 MILLIGRAM(S): 25 TABLET, ORALLY DISINTEGRATING ORAL at 20:40

## 2024-05-13 RX ADMIN — SENNA PLUS 2 TABLET(S): 8.6 TABLET ORAL at 22:12

## 2024-05-13 RX ADMIN — Medication 50 MILLIGRAM(S): at 05:12

## 2024-05-13 RX ADMIN — Medication 500 MILLIGRAM(S): at 09:20

## 2024-05-13 RX ADMIN — GABAPENTIN 400 MILLIGRAM(S): 400 CAPSULE ORAL at 20:37

## 2024-05-13 RX ADMIN — GABAPENTIN 400 MILLIGRAM(S): 400 CAPSULE ORAL at 09:21

## 2024-05-13 RX ADMIN — LAMOTRIGINE 150 MILLIGRAM(S): 25 TABLET, ORALLY DISINTEGRATING ORAL at 09:20

## 2024-05-13 RX ADMIN — BUPROPION HYDROCHLORIDE 150 MILLIGRAM(S): 150 TABLET, EXTENDED RELEASE ORAL at 09:20

## 2024-05-13 RX ADMIN — PANTOPRAZOLE SODIUM 40 MILLIGRAM(S): 20 TABLET, DELAYED RELEASE ORAL at 09:20

## 2024-05-13 NOTE — BH INPATIENT PSYCHIATRY PROGRESS NOTE - CURRENT MEDICATION
MEDICATIONS  (STANDING):  ascorbic acid 500 milliGRAM(s) Oral daily  buPROPion XL (24-Hour) 150 milliGRAM(s) Oral daily  fluticasone propionate 50 MICROgram(s)/spray Nasal Spray 1 Spray(s) Both Nostrils two times a day  gabapentin 400 milliGRAM(s) Oral two times a day  lamoTRIgine 150 milliGRAM(s) Oral two times a day  melatonin. 3 milliGRAM(s) Oral at bedtime  pantoprazole    Tablet 40 milliGRAM(s) Oral before breakfast  polyethylene glycol 3350 17 Gram(s) Oral daily  senna 2 Tablet(s) Oral at bedtime    MEDICATIONS  (PRN):  acetaminophen     Tablet .. 650 milliGRAM(s) Oral every 6 hours PRN Mild Pain (1 - 3), Moderate Pain (4 - 6)  aluminum hydroxide/magnesium hydroxide/simethicone Suspension 30 milliLiter(s) Oral every 6 hours PRN Dyspepsia  benzocaine/menthol Lozenge 1 Lozenge Oral every 4 hours PRN Sore throat/cough  diphenhydrAMINE 50 milliGRAM(s) Oral at bedtime PRN insomnia  diphenhydrAMINE 50 milliGRAM(s) Oral every 4 hours PRN agitation  diphenhydrAMINE Injectable 50 milliGRAM(s) IntraMuscular once PRN Agitation  guaiFENesin  milliGRAM(s) Oral every 12 hours PRN Chest congestion  hydrOXYzine hydrochloride 50 milliGRAM(s) Oral every 6 hours PRN anxiety  lidocaine   4% Patch 1 Patch Transdermal daily PRN back pain  LORazepam     Tablet 0.5 milliGRAM(s) Oral every 8 hours PRN Anxiety  magnesium hydroxide Suspension 30 milliLiter(s) Oral daily PRN Constipation  OLANZapine 5 milliGRAM(s) Oral every 6 hours PRN agitation  OLANZapine Injectable 5 milliGRAM(s) IntraMuscular once PRN severe agitation  sodium chloride 0.65% Nasal 1 Spray(s) Both Nostrils five times a day PRN Nasal Congestion  traZODone 100 milliGRAM(s) Oral at bedtime PRN insomnia

## 2024-05-13 NOTE — BH INPATIENT PSYCHIATRY PROGRESS NOTE - OTHER
chronic passive SI, pt denies intent or plan  oddly related  irritable, instantly negates any suggestions provided to assist her with her sxs

## 2024-05-13 NOTE — BH INPATIENT PSYCHIATRY PROGRESS NOTE - NSBHFUPINTERVALHXFT_PSY_A_CORE
Pt assessed for depression and SI. Chart reviewed and case discussed with treatment team. Overnight, pt noted to stop taking wellbutrin after one dose. On approach, pt appears irritable, with poor eye contact. When asked why she decided to stop the wellbutrin, pt stated, "I have no good answer for you." Pt unable to explain why she did not want to take wellbutrin and denied any SE after the one dose she did take. Pt requested gabapentin be BID dosing on Friday, pt unable to explain why she did not want to take a midday dose if she is reporting anxiety at that time. Of note, pt refused midday gabapentin dose, but asked for ativan PRN instead. Discussed at great length that if pt is unwilling to change treatment or use coping skills as advised, pt cannot expect changes to her sxs. Pt acknowledges this, but is unable to state why she is resisting change despite complaining that she needs to make changes. Pt eventually agreed to try wellbutrin again today. Pt reports passive SI but denies any intent or plan, agrees to come to staff immediately with any safety concerns. Denies HIIP, A/VH, or paranoia. Projected discharge this week

## 2024-05-14 VITALS — TEMPERATURE: 98 F | RESPIRATION RATE: 19 BRPM

## 2024-05-14 PROCEDURE — 99232 SBSQ HOSP IP/OBS MODERATE 35: CPT | Mod: FS

## 2024-05-14 RX ORDER — ASCORBIC ACID 60 MG
1 TABLET,CHEWABLE ORAL
Qty: 14 | Refills: 0
Start: 2024-05-14 | End: 2024-05-27

## 2024-05-14 RX ORDER — GABAPENTIN 400 MG/1
1 CAPSULE ORAL
Qty: 28 | Refills: 0
Start: 2024-05-14 | End: 2024-05-27

## 2024-05-14 RX ORDER — LAMOTRIGINE 25 MG/1
1 TABLET, ORALLY DISINTEGRATING ORAL
Qty: 28 | Refills: 0
Start: 2024-05-14 | End: 2024-05-27

## 2024-05-14 RX ORDER — PANTOPRAZOLE SODIUM 20 MG/1
1 TABLET, DELAYED RELEASE ORAL
Qty: 14 | Refills: 0
Start: 2024-05-14 | End: 2024-05-27

## 2024-05-14 RX ORDER — TRAZODONE HCL 50 MG
1 TABLET ORAL
Qty: 14 | Refills: 0
Start: 2024-05-14 | End: 2024-05-27

## 2024-05-14 RX ORDER — BUPROPION HYDROCHLORIDE 150 MG/1
1 TABLET, EXTENDED RELEASE ORAL
Qty: 14 | Refills: 0
Start: 2024-05-14 | End: 2024-05-27

## 2024-05-14 RX ORDER — LANOLIN ALCOHOL/MO/W.PET/CERES
1 CREAM (GRAM) TOPICAL
Qty: 14 | Refills: 0
Start: 2024-05-14 | End: 2024-05-27

## 2024-05-14 RX ORDER — SENNA PLUS 8.6 MG/1
2 TABLET ORAL
Qty: 28 | Refills: 0
Start: 2024-05-14 | End: 2024-05-27

## 2024-05-14 RX ADMIN — Medication 100 MILLIGRAM(S): at 22:57

## 2024-05-14 RX ADMIN — GABAPENTIN 400 MILLIGRAM(S): 400 CAPSULE ORAL at 08:13

## 2024-05-14 RX ADMIN — GABAPENTIN 400 MILLIGRAM(S): 400 CAPSULE ORAL at 20:36

## 2024-05-14 RX ADMIN — Medication 50 MILLIGRAM(S): at 01:33

## 2024-05-14 RX ADMIN — LAMOTRIGINE 150 MILLIGRAM(S): 25 TABLET, ORALLY DISINTEGRATING ORAL at 20:36

## 2024-05-14 RX ADMIN — SENNA PLUS 2 TABLET(S): 8.6 TABLET ORAL at 22:57

## 2024-05-14 RX ADMIN — BUPROPION HYDROCHLORIDE 150 MILLIGRAM(S): 150 TABLET, EXTENDED RELEASE ORAL at 08:13

## 2024-05-14 RX ADMIN — PANTOPRAZOLE SODIUM 40 MILLIGRAM(S): 20 TABLET, DELAYED RELEASE ORAL at 08:13

## 2024-05-14 RX ADMIN — LAMOTRIGINE 150 MILLIGRAM(S): 25 TABLET, ORALLY DISINTEGRATING ORAL at 08:12

## 2024-05-14 RX ADMIN — Medication 500 MILLIGRAM(S): at 08:13

## 2024-05-14 NOTE — BH INPATIENT PSYCHIATRY PROGRESS NOTE - NSBHMSEAFFRANGE_PSY_A_CORE
Labile
Constricted
Constricted
Labile
Constricted

## 2024-05-14 NOTE — BH INPATIENT PSYCHIATRY PROGRESS NOTE - NSTXDEPRESINTERMD_PSY_ALL_CORE
med management/ group, milieu therapy

## 2024-05-14 NOTE — BH INPATIENT PSYCHIATRY PROGRESS NOTE - NSCGIIMPROVESX_PSY_ALL_CORE

## 2024-05-14 NOTE — BH INPATIENT PSYCHIATRY PROGRESS NOTE - NSBHMSETHTPROC_PSY_A_CORE
Linear/Perseverative
Linear/Perseverative
Linear
Linear
Linear/Perseverative
Linear/Perseverative
Linear
Perseverative
Linear
Linear

## 2024-05-14 NOTE — BH INPATIENT PSYCHIATRY PROGRESS NOTE - NSBHMSETHTCONTENT_PSY_A_CORE
Ruminations/Suicidality/Other
Suicidality/Other
Ruminations/Suicidality/Other
Hopelessness/Suicidality
Suicidality/Other
Ruminations/Suicidality/Other
Hopelessness/Suicidality
Suicidality/Other
Suicidality/Other
Hopelessness/Suicidality
Unremarkable
Ruminations/Suicidality/Other
Ruminations/Suicidality/Other
Hopelessness/Suicidality

## 2024-05-14 NOTE — BH INPATIENT PSYCHIATRY PROGRESS NOTE - ATTENDING COMMENTS
Care was discussed and reviewed in the interdisciplinary treatment team.  I, Maryam Hawk MD, have reviewed and verified the documentation.  I independently performed the documented medical decision making.  Care was discussed and reviewed in the interdisciplinary treatment team.  I, Maryam Hawk MD, have reviewed and verified the documentation.  I independently performed the documented medical decision making.     Patient reported that she is feeling ready to leave the hospital tomorrow. She denies any thoughts of harming her self and contracted for safety. Patient was able to talk about her safety plan. Continues to present as very negative and pessimistic.

## 2024-05-14 NOTE — BH DISCHARGE NOTE NURSING/SOCIAL WORK/PSYCH REHAB - NSDCPRGOAL_PSY_ALL_CORE
The writer met with patient to safety plan for discharge and discuss the patient’s progress throughout her inpatient stay. Patient was receptive to safety planning and readily identified warning signs, coping skills, triggers, and reasons for living. The patient presented with suicidal ideation in the setting of psychosocial stressors in the community. The patient minimally met her psychiatric rehabilitation goal to identify 2 coping skills that assist in improving mood for her depressive symptoms goal. The patient reported deep breathing and calling a friend as her coping skills. The patient is compliant with her medications, engaged with her peers, and has fair ADL’s and behavioral control. The patient denied SI/HI/AH/VH. The patient attended 55% of the Psychiatric Rehabilitation rounds throughout her stay. Psychiatric Rehabilitation staff recommends that the patient engage in outpatient services for continued medication and symptom management.

## 2024-05-14 NOTE — BH INPATIENT PSYCHIATRY PROGRESS NOTE - ADDITIONAL DETAILS / COMMENTS
Pt exhibited likely splitting behavior re: attending and student

## 2024-05-14 NOTE — BH INPATIENT PSYCHIATRY PROGRESS NOTE - NSBHMSESPEECH_PSY_A_CORE
Normal volume, rate, productivity, spontaneity and articulation
Abnormal as indicated, otherwise normal...
Abnormal as indicated, otherwise normal...
Normal volume, rate, productivity, spontaneity and articulation

## 2024-05-14 NOTE — BH INPATIENT PSYCHIATRY PROGRESS NOTE - NSTXDCOPNODATETRGT_PSY_ALL_CORE
10-May-2024
17-May-2024
03-May-2024
03-May-2024
10-May-2024
17-May-2024
03-May-2024
03-May-2024
10-May-2024
03-May-2024
08-May-2024
10-May-2024
08-May-2024
10-May-2024

## 2024-05-14 NOTE — BH INPATIENT PSYCHIATRY PROGRESS NOTE - NSTXDCOPNODATEEST_PSY_ALL_CORE
26-Apr-2024
26-Apr-2024
10-May-2024
26-Apr-2024
03-May-2024
26-Apr-2024
03-May-2024
26-Apr-2024
10-May-2024
26-Apr-2024
03-May-2024
03-May-2024
26-Apr-2024
03-May-2024

## 2024-05-14 NOTE — BH DISCHARGE NOTE NURSING/SOCIAL WORK/PSYCH REHAB - NSDCADDINFO1FT_PSY_ALL_CORE
This will be your initial follow-up appointment.     [facility] is located on the first floor of the [facility].The main entrance to the building is at the corner of [address] [place] [address] and [address] in [place], [place]. You can also access the campus through the hospital entrance at [phone] The hospital entrance offers a free self-parking lot; the [address] [place] [address] entrance is street parking only.

## 2024-05-14 NOTE — BH INPATIENT PSYCHIATRY PROGRESS NOTE - OTHER
"ok" chronic passive SI, pt denies intent or plan  neutral irritable, instantly negates any suggestions provided to assist her with her sxs

## 2024-05-14 NOTE — BH INPATIENT PSYCHIATRY PROGRESS NOTE - NSTXCOPEPROGRES_PSY_ALL_CORE
Improving
No Change
No Change
Improving
No Change
Improving
Improving
No Change
No Change
Improving

## 2024-05-14 NOTE — BH DISCHARGE NOTE NURSING/SOCIAL WORK/PSYCH REHAB - NSCDUDCCRISIS_PSY_A_CORE
[facility]  1 (270) [facility] ([phone])  Text "WELL" to 66048  Website: www.[employer]/.[employer] [facility] 1 (424) 621-ISBE (9790) Website: www.[facility].org/.National Suicide Prevention Lifeline 5 (551) 047-3439/.  [employer]  1 (790) LIFENET (523-9698)/.  [facility]  [phone] [place] [address] [phone]   Hours:  Monday through Friday from 9 AM to 3 PM/988 Suicide and Crisis Lifeline

## 2024-05-14 NOTE — BH INPATIENT PSYCHIATRY PROGRESS NOTE - NSBHFUPINTERVALHXFT_PSY_A_CORE
Pt assessed for depression and SI. Chart reviewed and case discussed with treatment team. No interval events reported overnight. Pt seen with ARIS Grimm present. Pt took wellbutrin this AM and provided with positive reinforcement for this. Pt denies feeling any positive effects from it, but reports mild right hand tremor she associates with lexapro that has since been discontinued several days ago. Dispo planning discussed and pt in agreement with discharge tomorrow though she feels nothing has changed. Pt reports chronic, baseline passive SI but denies any intent or plan, agrees to come to staff immediately with any safety concerns. Denies HIIP, A/VH, or paranoia. Projected discharge tomorrow

## 2024-05-14 NOTE — BH INPATIENT PSYCHIATRY PROGRESS NOTE - NSBHATTESTAPPBILLTIME_PSY_A_CORE
I attest my time as DHIRAJ is greater than 50% of the total combined time spent on qualifying patient care activities. I have reviewed and verified the documentation.

## 2024-05-14 NOTE — BH INPATIENT PSYCHIATRY PROGRESS NOTE - NSTXDEPRESDATETRGT_PSY_ALL_CORE
08-May-2024
08-May-2024
16-May-2024
02-May-2024
08-May-2024
02-May-2024
02-May-2024
08-May-2024
16-May-2024
15-May-2024
08-May-2024
08-May-2024

## 2024-05-14 NOTE — BH INPATIENT PSYCHIATRY PROGRESS NOTE - NSBHCONSBHPROVDETAILS_PSY_A_CORE  FT
Joelle Hilton NP ([phone]) or clinic is [facility] [phone] called, message left, awaiting call back
Joelle Hliton NP ([phone]) or clinic is [facility] [phone] called, message left, awaiting call back
Joelle Hilton NP ([phone]) or clinic is [facility] [phone] called, message left, awaiting call back
Joelle Hilton NP ([phone]) or clinic is [facility] [phone] called, message left, awaiting call back
Joelle Hilton NP ([phone]) or clinic is [facility] [phone] called, message left, awaiting call back
Joelle Hilton NP ([phone]) or clinic is [facility] [phone] called, message left, awaiting call back
Joelle Hilton NP ([phone]) or clinic is [facility] [phone] called, message left, awaiting call back
Joelle Hilton NP ([phone]) or clinic is [facility] [phone] called, message left, awaiting call back
Joelle Hilton NP ([phone]) or clinic is [facility] [phone] called, message left, awaiting call back
Joelle Hilton NP ([phone]) or clinic is [facility] [phone] called, message left, awaiting call back
Joelle Hilton NP ([phone]) or clinic is [facility] [phone] called, message left, awaiting call back

## 2024-05-14 NOTE — BH INPATIENT PSYCHIATRY PROGRESS NOTE - NSICDXBHPRIMARYDX_PSY_ALL_CORE
Bipolar disorder   F31.9  

## 2024-05-14 NOTE — BH INPATIENT PSYCHIATRY PROGRESS NOTE - NSBHFUPINTERVALCCFT_PSY_A_CORE
"It was a  out of my life"
"I'm irritable and angry"
Pt seen f/u for bipolar d/o
"I don't know"
"I don't know what to do"
"I'm losing patience"
Pt seen f/u for bipolar d/o
"It's so noisy here!"
"I can't stand the noise"
"I'm ok"
"What can I do?"
"I don't want to!"
"I'm constipated"
"I'm the same"

## 2024-05-14 NOTE — BH DISCHARGE NOTE NURSING/SOCIAL WORK/PSYCH REHAB - NSDCADDINFO2FT_PSY_ALL_CORE
This will be your intake appointment for continuos outpatient care.   The [facility] clinic is located on the second floor of the [facility], the same building as the Crisis Center. Please arrive early to complete the intake paperwork.

## 2024-05-14 NOTE — BH INPATIENT PSYCHIATRY PROGRESS NOTE - NSTXCOPEDATETRGT_PSY_ALL_CORE
02-May-2024
08-May-2024
08-May-2024
02-May-2024
08-May-2024
02-May-2024
08-May-2024
02-May-2024
02-May-2024
08-May-2024
08-May-2024

## 2024-05-14 NOTE — BH INPATIENT PSYCHIATRY PROGRESS NOTE - NSTXDEPRESGOAL_PSY_ALL_CORE
Other...
Will identify 2 coping skills that assist in improving mood
Other...
Will identify 2 coping skills that assist in improving mood

## 2024-05-14 NOTE — BH INPATIENT PSYCHIATRY PROGRESS NOTE - NSTXDEPRESPROGRES_PSY_ALL_CORE
No Change
Improving
Met - goal discontinued
No Change
Improving
No Change
No Change

## 2024-05-14 NOTE — BH INPATIENT PSYCHIATRY PROGRESS NOTE - NSBHMSEAFFCONG_PSY_A_CORE
Congruent
Congruent
Non-congruent
Congruent
Congruent
Non-congruent
Congruent

## 2024-05-14 NOTE — BH INPATIENT PSYCHIATRY PROGRESS NOTE - NSBHATTESTATTENDBILLTIME_PSY_A_CORE
I independently performed the documented

## 2024-05-14 NOTE — BH INPATIENT PSYCHIATRY PROGRESS NOTE - NSTXDEPRESDATEEST_PSY_ALL_CORE
26-Apr-2024
25-Apr-2024
26-Apr-2024
25-Apr-2024
25-Apr-2024
26-Apr-2024
25-Apr-2024

## 2024-05-14 NOTE — BH INPATIENT PSYCHIATRY PROGRESS NOTE - NSBHCHARTREVIEWVS_PSY_A_CORE FT
Vital Signs Last 24 Hrs  T(C): 36.6 (05-07-24 @ 07:17), Max: 36.6 (05-07-24 @ 07:17)  T(F): 97.8 (05-07-24 @ 07:17), Max: 97.8 (05-07-24 @ 07:17)  HR: --  BP: --  BP(mean): --  RR: 19 (05-07-24 @ 07:17) (19 - 19)  SpO2: --    Orthostatic VS  05-07-24 @ 07:17  Lying BP: --/-- HR: --  Sitting BP: 115/78 HR: 96  Standing BP: 112/72 HR: 101  Site: --  Mode: --  Orthostatic VS  05-06-24 @ 09:22  Lying BP: --/-- HR: --  Sitting BP: 127/81 HR: 88  Standing BP: 121/77 HR: 93  Site: --  Mode: --  Orthostatic VS  05-06-24 @ 07:54  Lying BP: --/-- HR: --  Sitting BP: 132/76 HR: 93  Standing BP: 110/74 HR: 102  Site: --  Mode: --  
Vital Signs Last 24 Hrs  T(C): 36.3 (04-30-24 @ 07:19), Max: 36.3 (04-30-24 @ 07:19)  T(F): 97.4 (04-30-24 @ 07:19), Max: 97.4 (04-30-24 @ 07:19)  HR: --  BP: --  BP(mean): --  RR: 18 (04-30-24 @ 07:19) (18 - 18)  SpO2: --    Orthostatic VS  04-30-24 @ 07:19  Lying BP: --/-- HR: --  Sitting BP: 110/76 HR: 101  Standing BP: 112/78 HR: 107  Site: --  Mode: --  Orthostatic VS  04-29-24 @ 07:23  Lying BP: --/-- HR: --  Sitting BP: 105/74 HR: 103  Standing BP: 112/72 HR: 109  Site: --  Mode: --  
Vital Signs Last 24 Hrs  T(C): 36.2 (05-02-24 @ 08:11), Max: 36.2 (05-02-24 @ 08:11)  T(F): 97.2 (05-02-24 @ 08:11), Max: 97.2 (05-02-24 @ 08:11)  HR: --  BP: --  BP(mean): --  RR: --  SpO2: --    Orthostatic VS  05-02-24 @ 08:11  Lying BP: --/-- HR: --  Sitting BP: 119/84 HR: 93  Standing BP: 115/77 HR: 97  Site: --  Mode: --  Orthostatic VS  05-01-24 @ 07:22  Lying BP: --/-- HR: --  Sitting BP: 125/70 HR: 97  Standing BP: 115/69 HR: 109  Site: --  Mode: --  
Vital Signs Last 24 Hrs  T(C): 36.5 (05-08-24 @ 08:03), Max: 36.5 (05-08-24 @ 08:03)  T(F): 97.7 (05-08-24 @ 08:03), Max: 97.7 (05-08-24 @ 08:03)  HR: --  BP: --  BP(mean): --  RR: 17 (05-08-24 @ 08:03) (17 - 17)  SpO2: --    Orthostatic VS  05-08-24 @ 08:03  Lying BP: --/-- HR: --  Sitting BP: 109/72 HR: 91  Standing BP: 112/84 HR: 78  Site: --  Mode: --  Orthostatic VS  05-07-24 @ 07:17  Lying BP: --/-- HR: --  Sitting BP: 115/78 HR: 96  Standing BP: 112/72 HR: 101  Site: --  Mode: --  
Vital Signs Last 24 Hrs  T(C): 36.4 (05-10-24 @ 07:18), Max: 36.4 (05-10-24 @ 07:18)  T(F): 97.5 (05-10-24 @ 07:18), Max: 97.5 (05-10-24 @ 07:18)  HR: --  BP: --  BP(mean): --  RR: --  SpO2: --    Orthostatic VS  05-10-24 @ 07:18  Lying BP: --/-- HR: --  Sitting BP: 122/80 HR: 93  Standing BP: 118/78 HR: 90  Site: --  Mode: --  Orthostatic VS  05-09-24 @ 07:17  Lying BP: --/-- HR: --  Sitting BP: 123/82 HR: 91  Standing BP: 120/85 HR: 101  Site: --  Mode: --  
Vital Signs Last 24 Hrs  T(C): 36.4 (05-01-24 @ 07:22), Max: 36.4 (05-01-24 @ 07:22)  T(F): 97.6 (05-01-24 @ 07:22), Max: 97.6 (05-01-24 @ 07:22)  HR: --  BP: --  BP(mean): --  RR: 18 (05-01-24 @ 07:22) (18 - 18)  SpO2: --    Orthostatic VS  05-01-24 @ 07:22  Lying BP: --/-- HR: --  Sitting BP: 125/70 HR: 97  Standing BP: 115/69 HR: 109  Site: --  Mode: --  Orthostatic VS  04-30-24 @ 07:19  Lying BP: --/-- HR: --  Sitting BP: 110/76 HR: 101  Standing BP: 112/78 HR: 107  Site: --  Mode: --  
Vital Signs Last 24 Hrs  T(C): 36.5 (05-14-24 @ 07:42), Max: 36.5 (05-14-24 @ 07:42)  T(F): 97.7 (05-14-24 @ 07:42), Max: 97.7 (05-14-24 @ 07:42)  HR: --  BP: --  BP(mean): --  RR: 19 (05-14-24 @ 07:42) (19 - 19)  SpO2: --    Orthostatic VS  05-14-24 @ 07:42  Lying BP: --/-- HR: --  Sitting BP: 121/72 HR: 90  Standing BP: 124/84 HR: 104  Site: --  Mode: --  Orthostatic VS  05-13-24 @ 09:19  Lying BP: --/-- HR: --  Sitting BP: 120/75 HR: 87  Standing BP: 118/76 HR: 92  Site: --  Mode: --  
Vital Signs Last 24 Hrs  T(C): 36.4 (05-03-24 @ 07:25), Max: 36.4 (05-03-24 @ 07:25)  T(F): 97.6 (05-03-24 @ 07:25), Max: 97.6 (05-03-24 @ 07:25)  HR: --  BP: --  BP(mean): --  RR: 18 (05-03-24 @ 07:25) (18 - 18)  SpO2: --    Orthostatic VS  05-03-24 @ 07:25  Lying BP: --/-- HR: --  Sitting BP: 124/81 HR: 95  Standing BP: 136/89 HR: 100  Site: --  Mode: --  Orthostatic VS  05-02-24 @ 08:11  Lying BP: --/-- HR: --  Sitting BP: 119/84 HR: 93  Standing BP: 115/77 HR: 97  Site: --  Mode: --  
Vital Signs Last 24 Hrs  T(C): 36.4 (05-09-24 @ 07:17), Max: 36.4 (05-09-24 @ 07:17)  T(F): 97.6 (05-09-24 @ 07:17), Max: 97.6 (05-09-24 @ 07:17)  HR: --  BP: --  BP(mean): --  RR: 19 (05-09-24 @ 07:17) (19 - 19)  SpO2: --    Orthostatic VS  05-09-24 @ 07:17  Lying BP: --/-- HR: --  Sitting BP: 123/82 HR: 91  Standing BP: 120/85 HR: 101  Site: --  Mode: --  Orthostatic VS  05-08-24 @ 08:03  Lying BP: --/-- HR: --  Sitting BP: 109/72 HR: 91  Standing BP: 112/84 HR: 78  Site: --  Mode: --  
Vital Signs Last 24 Hrs  T(C): 37.1 (05-13-24 @ 09:19), Max: 37.1 (05-13-24 @ 09:19)  T(F): 98.7 (05-13-24 @ 09:19), Max: 98.7 (05-13-24 @ 09:19)  HR: --  BP: --  BP(mean): --  RR: 19 (05-13-24 @ 09:19) (19 - 19)  SpO2: --    Orthostatic VS  05-13-24 @ 09:19  Lying BP: --/-- HR: --  Sitting BP: 120/75 HR: 87  Standing BP: 118/76 HR: 92  Site: --  Mode: --  Orthostatic VS  05-12-24 @ 08:00  Lying BP: --/-- HR: --  Sitting BP: 119/83 HR: 96  Standing BP: 122/76 HR: 99  Site: --  Mode: --  
Vital Signs Last 24 Hrs  T(C): 36.6 (04-29-24 @ 07:23), Max: 37.1 (04-28-24 @ 17:58)  T(F): 97.9 (04-29-24 @ 07:23), Max: 98.7 (04-28-24 @ 17:58)  HR: 107 (04-28-24 @ 17:58) (107 - 107)  BP: 134/72 (04-28-24 @ 17:58) (134/72 - 134/72)  BP(mean): --  RR: 18 (04-29-24 @ 07:23) (18 - 18)  SpO2: 98% (04-28-24 @ 17:58) (98% - 98%)    Orthostatic VS  04-29-24 @ 07:23  Lying BP: --/-- HR: --  Sitting BP: 105/74 HR: 103  Standing BP: 112/72 HR: 109  Site: --  Mode: --  Orthostatic VS  04-28-24 @ 07:31  Lying BP: --/-- HR: --  Sitting BP: 118/80 HR: 100  Standing BP: 125/84 HR: 93  Site: --  Mode: --  
Vital Signs Last 24 Hrs  T(C): 37.1 (04-27-24 @ 07:17), Max: 37.1 (04-27-24 @ 07:17)  T(F): 98.7 (04-27-24 @ 07:17), Max: 98.7 (04-27-24 @ 07:17)  HR: --  BP: --  BP(mean): --  RR: 18 (04-27-24 @ 07:17) (18 - 18)  SpO2: --    Orthostatic VS  04-27-24 @ 07:17  Lying BP: --/-- HR: --  Sitting BP: 112/79 HR: 99  Standing BP: 119/82 HR: 112  Site: --  Mode: --  Orthostatic VS  04-26-24 @ 07:48  Lying BP: --/-- HR: --  Sitting BP: 118/79 HR: 93  Standing BP: 118/82 HR: 91  Site: --  Mode: --  Orthostatic VS  04-25-24 @ 18:12  Lying BP: --/-- HR: --  Sitting BP: 133/90 HR: 92  Standing BP: 136/87 HR: 98  Site: --  Mode: --  
Vital Signs Last 24 Hrs  T(C): 36.3 (05-06-24 @ 07:54), Max: 36.3 (05-06-24 @ 07:54)  T(F): 97.3 (05-06-24 @ 07:54), Max: 97.3 (05-06-24 @ 07:54)  HR: --  BP: --  BP(mean): --  RR: 19 (05-06-24 @ 07:54) (19 - 19)  SpO2: --    Orthostatic VS  05-06-24 @ 09:22  Lying BP: --/-- HR: --  Sitting BP: 127/81 HR: 88  Standing BP: 121/77 HR: 93  Site: --  Mode: --  Orthostatic VS  05-06-24 @ 07:54  Lying BP: --/-- HR: --  Sitting BP: 132/76 HR: 93  Standing BP: 110/74 HR: 102  Site: --  Mode: --  Orthostatic VS  05-05-24 @ 07:47  Lying BP: --/-- HR: --  Sitting BP: 121/79 HR: 94  Standing BP: 118/80 HR: 98  Site: --  Mode: --  
Vital Signs Last 24 Hrs  T(C): 37.4 (04-28-24 @ 07:31), Max: 37.4 (04-28-24 @ 07:31)  T(F): 99.3 (04-28-24 @ 07:31), Max: 99.3 (04-28-24 @ 07:31)  HR: --  BP: --  BP(mean): --  RR: 18 (04-28-24 @ 07:31) (18 - 18)  SpO2: --    Orthostatic VS  04-28-24 @ 07:31  Lying BP: --/-- HR: --  Sitting BP: 118/80 HR: 100  Standing BP: 125/84 HR: 93  Site: --  Mode: --  Orthostatic VS  04-27-24 @ 07:17  Lying BP: --/-- HR: --  Sitting BP: 112/79 HR: 99  Standing BP: 119/82 HR: 112  Site: --  Mode: --

## 2024-05-14 NOTE — BH INPATIENT PSYCHIATRY PROGRESS NOTE - NSBHMSEAFFQUAL_PSY_A_CORE
Irritable
Irritable
Irritable/Anxious
Depressed/Anxious
Irritable
Depressed/Anxious
Other
Anxious
Depressed/Anxious
Irritable
Anxious

## 2024-05-14 NOTE — BH INPATIENT PSYCHIATRY PROGRESS NOTE - NSBHATTESTBILLING_PSY_A_CORE
84724-Acjotxwisf OBS or IP - moderate complexity OR 35-49 mins
01685-Ztqqpoublp OBS or IP - moderate complexity OR 35-49 mins
46307-Wimwyugqxk OBS or IP - moderate complexity OR 35-49 mins
51984-Tcdrciygwe OBS or IP - moderate complexity OR 35-49 mins
15578-Iirhoweowc OBS or IP - moderate complexity OR 35-49 mins
31761-Ufropodltx OBS or IP - moderate complexity OR 35-49 mins
48121-Xjittkbdae OBS or IP - moderate complexity OR 35-49 mins
27201-Afhpswiiuf OBS or IP - moderate complexity OR 35-49 mins
40015-Yhagfpesur OBS or IP - high complexity OR 50-79 mins
95477-Cvqejrmlox OBS or IP - moderate complexity OR 35-49 mins
24713-Rkkznhiedk OBS or IP - moderate complexity OR 35-49 mins
36471-Fnqtwyshnv OBS or IP - high complexity OR 50-79 mins
85221-Dsnfnntfuf OBS or IP - moderate complexity OR 35-49 mins
12611-Dcuqdkadpx OBS or IP - moderate complexity OR 35-49 mins

## 2024-05-14 NOTE — BH INPATIENT PSYCHIATRY PROGRESS NOTE - NSBHMETABOLIC_PSY_ALL_CORE_FT
BMI: BMI (kg/m2): 25.8 (04-25-24 @ 18:12)  HbA1c:   Glucose:   BP: 145/89 (04-25-24 @ 11:41) (145/89 - 145/89)Vital Signs Last 24 Hrs  T(C): 37.1 (04-27-24 @ 07:17), Max: 37.1 (04-27-24 @ 07:17)  T(F): 98.7 (04-27-24 @ 07:17), Max: 98.7 (04-27-24 @ 07:17)  HR: --  BP: --  BP(mean): --  RR: 18 (04-27-24 @ 07:17) (18 - 18)  SpO2: --    Orthostatic VS  04-27-24 @ 07:17  Lying BP: --/-- HR: --  Sitting BP: 112/79 HR: 99  Standing BP: 119/82 HR: 112  Site: --  Mode: --  Orthostatic VS  04-26-24 @ 07:48  Lying BP: --/-- HR: --  Sitting BP: 118/79 HR: 93  Standing BP: 118/82 HR: 91  Site: --  Mode: --  Orthostatic VS  04-25-24 @ 18:12  Lying BP: --/-- HR: --  Sitting BP: 133/90 HR: 92  Standing BP: 136/87 HR: 98  Site: --  Mode: --    Lipid Panel: 
BMI: BMI (kg/m2): 25.8 (04-25-24 @ 18:12)  HbA1c:   Glucose:   BP: --Vital Signs Last 24 Hrs  T(C): 36.6 (05-07-24 @ 07:17), Max: 36.6 (05-07-24 @ 07:17)  T(F): 97.8 (05-07-24 @ 07:17), Max: 97.8 (05-07-24 @ 07:17)  HR: --  BP: --  BP(mean): --  RR: 19 (05-07-24 @ 07:17) (19 - 19)  SpO2: --    Orthostatic VS  05-07-24 @ 07:17  Lying BP: --/-- HR: --  Sitting BP: 115/78 HR: 96  Standing BP: 112/72 HR: 101  Site: --  Mode: --  Orthostatic VS  05-06-24 @ 09:22  Lying BP: --/-- HR: --  Sitting BP: 127/81 HR: 88  Standing BP: 121/77 HR: 93  Site: --  Mode: --  Orthostatic VS  05-06-24 @ 07:54  Lying BP: --/-- HR: --  Sitting BP: 132/76 HR: 93  Standing BP: 110/74 HR: 102  Site: --  Mode: --    Lipid Panel: 
BMI: BMI (kg/m2): 25.8 (04-25-24 @ 18:12)  HbA1c:   Glucose:   BP: --Vital Signs Last 24 Hrs  T(C): 37.4 (04-28-24 @ 07:31), Max: 37.4 (04-28-24 @ 07:31)  T(F): 99.3 (04-28-24 @ 07:31), Max: 99.3 (04-28-24 @ 07:31)  HR: --  BP: --  BP(mean): --  RR: 18 (04-28-24 @ 07:31) (18 - 18)  SpO2: --    Orthostatic VS  04-28-24 @ 07:31  Lying BP: --/-- HR: --  Sitting BP: 118/80 HR: 100  Standing BP: 125/84 HR: 93  Site: --  Mode: --  Orthostatic VS  04-27-24 @ 07:17  Lying BP: --/-- HR: --  Sitting BP: 112/79 HR: 99  Standing BP: 119/82 HR: 112  Site: --  Mode: --    Lipid Panel: 
BMI: BMI (kg/m2): 25.8 (04-25-24 @ 18:12)  HbA1c:   Glucose:   BP: --Vital Signs Last 24 Hrs  T(C): 37.1 (05-13-24 @ 09:19), Max: 37.1 (05-13-24 @ 09:19)  T(F): 98.7 (05-13-24 @ 09:19), Max: 98.7 (05-13-24 @ 09:19)  HR: --  BP: --  BP(mean): --  RR: 19 (05-13-24 @ 09:19) (19 - 19)  SpO2: --    Orthostatic VS  05-13-24 @ 09:19  Lying BP: --/-- HR: --  Sitting BP: 120/75 HR: 87  Standing BP: 118/76 HR: 92  Site: --  Mode: --  Orthostatic VS  05-12-24 @ 08:00  Lying BP: --/-- HR: --  Sitting BP: 119/83 HR: 96  Standing BP: 122/76 HR: 99  Site: --  Mode: --    Lipid Panel: 
BMI: BMI (kg/m2): 25.8 (04-25-24 @ 18:12)  HbA1c:   Glucose:   BP: --Vital Signs Last 24 Hrs  T(C): 36.4 (05-03-24 @ 07:25), Max: 36.4 (05-03-24 @ 07:25)  T(F): 97.6 (05-03-24 @ 07:25), Max: 97.6 (05-03-24 @ 07:25)  HR: --  BP: --  BP(mean): --  RR: 18 (05-03-24 @ 07:25) (18 - 18)  SpO2: --    Orthostatic VS  05-03-24 @ 07:25  Lying BP: --/-- HR: --  Sitting BP: 124/81 HR: 95  Standing BP: 136/89 HR: 100  Site: --  Mode: --  Orthostatic VS  05-02-24 @ 08:11  Lying BP: --/-- HR: --  Sitting BP: 119/84 HR: 93  Standing BP: 115/77 HR: 97  Site: --  Mode: --    Lipid Panel: 
BMI: BMI (kg/m2): 25.8 (04-25-24 @ 18:12)  HbA1c:   Glucose:   BP: 134/72 (04-28-24 @ 17:58) (134/72 - 134/72)Vital Signs Last 24 Hrs  T(C): 36.3 (04-30-24 @ 07:19), Max: 36.3 (04-30-24 @ 07:19)  T(F): 97.4 (04-30-24 @ 07:19), Max: 97.4 (04-30-24 @ 07:19)  HR: --  BP: --  BP(mean): --  RR: 18 (04-30-24 @ 07:19) (18 - 18)  SpO2: --    Orthostatic VS  04-30-24 @ 07:19  Lying BP: --/-- HR: --  Sitting BP: 110/76 HR: 101  Standing BP: 112/78 HR: 107  Site: --  Mode: --  Orthostatic VS  04-29-24 @ 07:23  Lying BP: --/-- HR: --  Sitting BP: 105/74 HR: 103  Standing BP: 112/72 HR: 109  Site: --  Mode: --    Lipid Panel: 
BMI: BMI (kg/m2): 25.8 (04-25-24 @ 18:12)  HbA1c:   Glucose:   BP: 134/72 (04-28-24 @ 17:58) (134/72 - 134/72)Vital Signs Last 24 Hrs  T(C): 36.4 (05-01-24 @ 07:22), Max: 36.4 (05-01-24 @ 07:22)  T(F): 97.6 (05-01-24 @ 07:22), Max: 97.6 (05-01-24 @ 07:22)  HR: --  BP: --  BP(mean): --  RR: 18 (05-01-24 @ 07:22) (18 - 18)  SpO2: --    Orthostatic VS  05-01-24 @ 07:22  Lying BP: --/-- HR: --  Sitting BP: 125/70 HR: 97  Standing BP: 115/69 HR: 109  Site: --  Mode: --  Orthostatic VS  04-30-24 @ 07:19  Lying BP: --/-- HR: --  Sitting BP: 110/76 HR: 101  Standing BP: 112/78 HR: 107  Site: --  Mode: --    Lipid Panel: 
BMI: BMI (kg/m2): 25.8 (04-25-24 @ 18:12)  HbA1c:   Glucose:   BP: --Vital Signs Last 24 Hrs  T(C): 36.3 (05-06-24 @ 07:54), Max: 36.3 (05-06-24 @ 07:54)  T(F): 97.3 (05-06-24 @ 07:54), Max: 97.3 (05-06-24 @ 07:54)  HR: --  BP: --  BP(mean): --  RR: 19 (05-06-24 @ 07:54) (19 - 19)  SpO2: --    Orthostatic VS  05-06-24 @ 09:22  Lying BP: --/-- HR: --  Sitting BP: 127/81 HR: 88  Standing BP: 121/77 HR: 93  Site: --  Mode: --  Orthostatic VS  05-06-24 @ 07:54  Lying BP: --/-- HR: --  Sitting BP: 132/76 HR: 93  Standing BP: 110/74 HR: 102  Site: --  Mode: --  Orthostatic VS  05-05-24 @ 07:47  Lying BP: --/-- HR: --  Sitting BP: 121/79 HR: 94  Standing BP: 118/80 HR: 98  Site: --  Mode: --    Lipid Panel: 
BMI: BMI (kg/m2): 25.8 (04-25-24 @ 18:12)  HbA1c:   Glucose:   BP: --Vital Signs Last 24 Hrs  T(C): 36.2 (05-02-24 @ 08:11), Max: 36.2 (05-02-24 @ 08:11)  T(F): 97.2 (05-02-24 @ 08:11), Max: 97.2 (05-02-24 @ 08:11)  HR: --  BP: --  BP(mean): --  RR: --  SpO2: --    Orthostatic VS  05-02-24 @ 08:11  Lying BP: --/-- HR: --  Sitting BP: 119/84 HR: 93  Standing BP: 115/77 HR: 97  Site: --  Mode: --  Orthostatic VS  05-01-24 @ 07:22  Lying BP: --/-- HR: --  Sitting BP: 125/70 HR: 97  Standing BP: 115/69 HR: 109  Site: --  Mode: --    Lipid Panel: 
BMI: BMI (kg/m2): 25.8 (04-25-24 @ 18:12)  HbA1c:   Glucose:   BP: --Vital Signs Last 24 Hrs  T(C): 36.5 (05-08-24 @ 08:03), Max: 36.5 (05-08-24 @ 08:03)  T(F): 97.7 (05-08-24 @ 08:03), Max: 97.7 (05-08-24 @ 08:03)  HR: --  BP: --  BP(mean): --  RR: 17 (05-08-24 @ 08:03) (17 - 17)  SpO2: --    Orthostatic VS  05-08-24 @ 08:03  Lying BP: --/-- HR: --  Sitting BP: 109/72 HR: 91  Standing BP: 112/84 HR: 78  Site: --  Mode: --  Orthostatic VS  05-07-24 @ 07:17  Lying BP: --/-- HR: --  Sitting BP: 115/78 HR: 96  Standing BP: 112/72 HR: 101  Site: --  Mode: --    Lipid Panel: 
BMI: BMI (kg/m2): 25.8 (04-25-24 @ 18:12)  HbA1c:   Glucose:   BP: 134/72 (04-28-24 @ 17:58) (134/72 - 134/72)Vital Signs Last 24 Hrs  T(C): 36.6 (04-29-24 @ 07:23), Max: 37.1 (04-28-24 @ 17:58)  T(F): 97.9 (04-29-24 @ 07:23), Max: 98.7 (04-28-24 @ 17:58)  HR: 107 (04-28-24 @ 17:58) (107 - 107)  BP: 134/72 (04-28-24 @ 17:58) (134/72 - 134/72)  BP(mean): --  RR: 18 (04-29-24 @ 07:23) (18 - 18)  SpO2: 98% (04-28-24 @ 17:58) (98% - 98%)    Orthostatic VS  04-29-24 @ 07:23  Lying BP: --/-- HR: --  Sitting BP: 105/74 HR: 103  Standing BP: 112/72 HR: 109  Site: --  Mode: --  Orthostatic VS  04-28-24 @ 07:31  Lying BP: --/-- HR: --  Sitting BP: 118/80 HR: 100  Standing BP: 125/84 HR: 93  Site: --  Mode: --    Lipid Panel: 
BMI: BMI (kg/m2): 25.8 (04-25-24 @ 18:12)  HbA1c:   Glucose:   BP: --Vital Signs Last 24 Hrs  T(C): 36.4 (05-09-24 @ 07:17), Max: 36.4 (05-09-24 @ 07:17)  T(F): 97.6 (05-09-24 @ 07:17), Max: 97.6 (05-09-24 @ 07:17)  HR: --  BP: --  BP(mean): --  RR: 19 (05-09-24 @ 07:17) (19 - 19)  SpO2: --    Orthostatic VS  05-09-24 @ 07:17  Lying BP: --/-- HR: --  Sitting BP: 123/82 HR: 91  Standing BP: 120/85 HR: 101  Site: --  Mode: --  Orthostatic VS  05-08-24 @ 08:03  Lying BP: --/-- HR: --  Sitting BP: 109/72 HR: 91  Standing BP: 112/84 HR: 78  Site: --  Mode: --    Lipid Panel: 
BMI: BMI (kg/m2): 25.8 (04-25-24 @ 18:12)  HbA1c:   Glucose:   BP: --Vital Signs Last 24 Hrs  T(C): 36.5 (05-14-24 @ 07:42), Max: 36.5 (05-14-24 @ 07:42)  T(F): 97.7 (05-14-24 @ 07:42), Max: 97.7 (05-14-24 @ 07:42)  HR: --  BP: --  BP(mean): --  RR: 19 (05-14-24 @ 07:42) (19 - 19)  SpO2: --    Orthostatic VS  05-14-24 @ 07:42  Lying BP: --/-- HR: --  Sitting BP: 121/72 HR: 90  Standing BP: 124/84 HR: 104  Site: --  Mode: --  Orthostatic VS  05-13-24 @ 09:19  Lying BP: --/-- HR: --  Sitting BP: 120/75 HR: 87  Standing BP: 118/76 HR: 92  Site: --  Mode: --    Lipid Panel: 
BMI: BMI (kg/m2): 25.8 (04-25-24 @ 18:12)  HbA1c:   Glucose:   BP: --Vital Signs Last 24 Hrs  T(C): 36.4 (05-10-24 @ 07:18), Max: 36.4 (05-10-24 @ 07:18)  T(F): 97.5 (05-10-24 @ 07:18), Max: 97.5 (05-10-24 @ 07:18)  HR: --  BP: --  BP(mean): --  RR: --  SpO2: --    Orthostatic VS  05-10-24 @ 07:18  Lying BP: --/-- HR: --  Sitting BP: 122/80 HR: 93  Standing BP: 118/78 HR: 90  Site: --  Mode: --  Orthostatic VS  05-09-24 @ 07:17  Lying BP: --/-- HR: --  Sitting BP: 123/82 HR: 91  Standing BP: 120/85 HR: 101  Site: --  Mode: --    Lipid Panel:

## 2024-05-14 NOTE — BH INPATIENT PSYCHIATRY PROGRESS NOTE - NSICDXBHSECONDARYDX_PSY_ALL_CORE
Borderline personality disorder   F60.3  

## 2024-05-14 NOTE — BH INPATIENT PSYCHIATRY PROGRESS NOTE - NSBHATTESTATTENDPERFORM_PSY_A_CORE
Medical Decision Making

## 2024-05-14 NOTE — BH DISCHARGE NOTE NURSING/SOCIAL WORK/PSYCH REHAB - DISCHARGE INSTRUCTIONS AFTERCARE APPOINTMENTS
In order to check the location, date, or time of your aftercare appointment, please refer to your Discharge Instructions Document given to you upon leaving the hospital.  If you have lost the instructions please call [phone]

## 2024-05-14 NOTE — BH INPATIENT PSYCHIATRY PROGRESS NOTE - NSBHATTESTTYPEVISIT_PSY_A_CORE
On-site Attending supervising DHIRAJ (99XXX codes)
On-site Attending supervising DHIRAJ (99XXX codes)
DHIRAJ without on-site Attending supervision
On-site Attending supervising DHIRAJ (99XXX codes)
DHIRAJ without on-site Attending supervision

## 2024-05-14 NOTE — BH INPATIENT PSYCHIATRY PROGRESS NOTE - NSBHMSEMOOD_PSY_A_CORE
Irritable
Irritable
Depressed/Anxious
Anxious/Irritable
Irritable
Other
Normal/Anxious
Anxious
Depressed/Anxious
Anxious
Depressed/Anxious
Depressed/Anxious

## 2024-05-14 NOTE — BH INPATIENT PSYCHIATRY PROGRESS NOTE - NSCGISEVERILLNESS_PSY_ALL_CORE
4 = Moderately ill – overt symptoms causing noticeable, but modest, functional impairment or distress; symptom level may warrant medication
5 = Markedly ill - intrusive symptoms that distinctly impair social/occupational function or cause intrusive levels of distress
5 = Markedly ill - intrusive symptoms that distinctly impair social/occupational function or cause intrusive levels of distress
4 = Moderately ill – overt symptoms causing noticeable, but modest, functional impairment or distress; symptom level may warrant medication
5 = Markedly ill - intrusive symptoms that distinctly impair social/occupational function or cause intrusive levels of distress

## 2024-05-14 NOTE — BH INPATIENT PSYCHIATRY PROGRESS NOTE - NSDCCRITERIA_PSY_ALL_CORE
CGI less than or equal to 3

## 2024-05-14 NOTE — BH INPATIENT PSYCHIATRY PROGRESS NOTE - NSBHMSEBEHAV_PSY_A_CORE
Cooperative
Cooperative/Other
Cooperative
Cooperative
Cooperative/Other
Cooperative/Other
Cooperative
Cooperative/Other
Cooperative
Cooperative/Other
Cooperative
Cooperative

## 2024-05-14 NOTE — BH INPATIENT PSYCHIATRY PROGRESS NOTE - NSBHCONSDANGERSELF_PSY_A_CORE
suicidal behavior

## 2024-05-14 NOTE — BH INPATIENT PSYCHIATRY PROGRESS NOTE - NSBHASSESSSUMMFT_PSY_ALL_CORE
Patient is a 48 yr old woman, single, domiciled and unemployed, with past hx of bipolar disorder, PTSD, borderline personality disorder and Psychogenic Seizures, per PSYCKES Borderline Personality Disorder Unspecified/Other Depressive Disorder Major Depressive Disorder Bipolar I Unspecified/Other Anxiety Disorder Unspecified/Other Bipolar Conversion Disorder PTSD Schizoaffective Disorder, with multiple inpatient psychiatric admissions and ED visits, last seen at [facility] at 4/25/2022 ED, including recent hospitalizations 4/28/2004 to 5/28/2004 to [facility] [place] (?) per PSYCKES, 6/20/2021 to 6/25/2021 to [facility] for acute judson, 6/9/2023 to 6/23/2023 to [facility], 4/4/2022 to [facility] Low 3 due to worsening anxiety and did not feel her pain was being properly managed on the unit, demanding discharge and provided 3DL. Pt became agitated and accused staff of keeping her a prisoner.  Pt later on submitted a 3DL letter requesting release on 4/5/22. Per records hx of suicide attempt vs gesture - superficially cut her neck with glass (which did not require suturing) in 2014. there is no hx of illicit substance.  Pertinent medical issues: endometriosis, chronic pains and ? seizure disorder.  Today, presented to the hospital with worsening depressed mood.      Patient endorsing depressed and anxious moods, hopelessness and suicidal ideations.  Pt agrees to come to staff immediately with any safety concerns. Pt help seeking and rejecting. Pt noted to state zyprexa helped with her mood, but she discontinued this anyway. Pt noted to take one dose of gabapentin AM dose this weekend and stopped. Pt agreed to start AM dose again and give medications a chance to work before stopping them      Plan:   1. Inpatient admission to [facility] 2W on a 9.13 legal status  2. Routine checks, no CO indicated in a locked, supervised, inpatient setting  3. Medications: c/w Lamotrigine 150 mg BID for bipolar depression, start Gabapentin 300mg QHS and 100mg qAM for anxiety/pain, Trazadone 100 mg qHS prn insomnia  4. PRNs: Zyprexa 5mg IM/PO, Ativan 2mg PO, Benadryl 50mg IM/PO for agitation, Ativan 0.5mg PO q6hr prn anxiety  5. Lidocaine patch prn back pain, Tylenol/Maalox/MOM prn  6. Group/ Milieu therapy  7. SW to pursue discharge planning
Patient is a 48 yr old woman, single, domiciled and unemployed, with past hx of bipolar disorder, PTSD, borderline personality disorder and Psychogenic Seizures, per PSYCKES Borderline Personality Disorder Unspecified/Other Depressive Disorder Major Depressive Disorder Bipolar I Unspecified/Other Anxiety Disorder Unspecified/Other Bipolar Conversion Disorder PTSD Schizoaffective Disorder, with multiple inpatient psychiatric admissions and ED visits, last seen at [facility] at 4/25/2022 ED, including recent hospitalizations 4/28/2004 to 5/28/2004 to [facility] [place] (?) per PSYCKES, 6/20/2021 to 6/25/2021 to [facility] for acute judson, 6/9/2023 to 6/23/2023 to [facility], 4/4/2022 to [facility] Low 3 due to worsening anxiety and did not feel her pain was being properly managed on the unit, demanding discharge and provided 3DL. Pt became agitated and accused staff of keeping her a prisoner.  Pt later on submitted a 3DL letter requesting release on 4/5/22. Per records hx of suicide attempt vs gesture - superficially cut her neck with glass (which did not require suturing) in 2014. there is no hx of illicit substance.  Pertinent medical issues: endometriosis, chronic pains and ? seizure disorder.  Today, presented to the hospital with worsening depressed mood.      Patient endorsing depressed and anxious moods, hopelessness and passive suicidal ideations.  Pt agrees to come to staff immediately with any safety concerns. Pt help seeking and rejecting. Pt agrees to increase gabapentin to 300 mg PO BID. Pt noted to deflect any problem solving or discussion of coping mechanisms - pt acknowledges this, but unable to discuss inability to enact change in a productive manner       Plan:   1. Inpatient admission to [facility] 2W on a 9.13 legal status  2. Routine checks, no CO indicated in a locked, supervised, inpatient setting  3. Medications: c/w Lamotrigine 150 mg BID for bipolar depression, start Gabapentin 300mg QHS and 100mg qAM for anxiety/pain, Trazadone 100 mg qHS prn insomnia  4. PRNs: Zyprexa 5mg IM/PO, Ativan 2mg PO, Benadryl 50mg IM/PO for agitation, Ativan 0.5mg PO q6hr prn anxiety  5. Lidocaine patch prn back pain, Tylenol/Maalox/MOM prn  6. Group/ Milieu therapy  7. SW to pursue discharge planning
Patient is a 48 yr old woman, single, domiciled and unemployed, with past hx of bipolar disorder, PTSD, borderline personality disorder and Psychogenic Seizures, per PSYCKES Borderline Personality Disorder Unspecified/Other Depressive Disorder Major Depressive Disorder Bipolar I Unspecified/Other Anxiety Disorder Unspecified/Other Bipolar Conversion Disorder PTSD Schizoaffective Disorder, with multiple inpatient psychiatric admissions and ED visits, last seen at [facility] at 4/25/2022 ED, including recent hospitalizations 4/28/2004 to 5/28/2004 to [facility] [place] (?) per PSYCKES, 6/20/2021 to 6/25/2021 to [facility] for acute judson, 6/9/2023 to 6/23/2023 to [facility], 4/4/2022 to [facility] Low 3 due to worsening anxiety and did not feel her pain was being properly managed on the unit, demanding discharge and provided 3DL. Pt became agitated and accused staff of keeping her a prisoner.  Pt later on submitted a 3DL letter requesting release on 4/5/22. Per records hx of suicide attempt vs gesture - superficially cut her neck with glass (which did not require suturing) in 2014. there is no hx of illicit substance.  Pertinent medical issues: endometriosis, chronic pains and ? seizure disorder.  Today, presented to the hospital with worsening depressed mood.      Patient remains help seeking and help rejecting, chronic passive suicidal ideations, denies intent or plan.  Pt agrees to come to staff immediately with any safety concerns. Pt noted to be more irritable with lexapro, pt agrees to d/c lexapro and start wellbutrin 150 mg PO QD. COVID+ 4/28/24 and 5/5/24; pt completed 10 days of COVID precautions. Dipso: AOPD appt in June, will obtain crisis clinic appt when pt endorses improvement in sxs. Pt refused respite referral.     Plan:   1. Inpatient admission to [facility] 2W on a 9.13 legal status  2. Routine checks, no CO indicated in a locked, supervised, inpatient setting  3. Medications: c/w Lamotrigine 150 mg BID for bipolar depression, start Gabapentin 300mg QHS and 100mg qAM for anxiety/pain, Trazadone 100 mg qHS prn insomnia  4. PRNs: Zyprexa 5mg IM/PO, Ativan 2mg PO, Benadryl 50mg IM/PO for agitation, Ativan 0.5mg PO q6hr prn anxiety  5. Lidocaine patch prn back pain, Tylenol/Maalox/MOM prn  6. Group/ Milieu therapy  7. SW to pursue discharge planning
Patient is a 48 yr old woman, single, domiciled and unemployed, with past hx of bipolar disorder, PTSD, borderline personality disorder and Psychogenic Seizures, per PSYCKES Borderline Personality Disorder Unspecified/Other Depressive Disorder Major Depressive Disorder Bipolar I Unspecified/Other Anxiety Disorder Unspecified/Other Bipolar Conversion Disorder PTSD Schizoaffective Disorder, with multiple inpatient psychiatric admissions and ED visits, last seen at [facility] at 4/25/2022 ED, including recent hospitalizations 4/28/2004 to 5/28/2004 to [facility] [place] (?) per PSYCKES, 6/20/2021 to 6/25/2021 to [facility] for acute judson, 6/9/2023 to 6/23/2023 to [facility], 4/4/2022 to [facility] Low 3 due to worsening anxiety and did not feel her pain was being properly managed on the unit, demanding discharge and provided 3DL. Pt became agitated and accused staff of keeping her a prisoner.  Pt later on submitted a 3DL letter requesting release on 4/5/22. Per records hx of suicide attempt vs gesture - superficially cut her neck with glass (which did not require suturing) in 2014. there is no hx of illicit substance.  Pertinent medical issues: endometriosis, chronic pains and ? seizure disorder.  Today, presented to the hospital with worsening depressed mood.      Patient remains help seeking and help rejecting, chronic passive suicidal ideations, denies intent or plan.  Pt agrees to come to staff immediately with any safety concerns. Pt noted to be more irritable with lexapro, pt agrees to d/c lexapro and start wellbutrin 150 mg PO QD. COVID+ 4/28/24 and 5/5/24; pt completed 10 days of COVID precautions. Pt reported PNES episode yesterday, "But there's nothing I can use for treatment of this." Dipso: AOPD appt in June, will obtain crisis clinic appt when pt endorses improvement in sxs. Pt refused respite referral. Projected discharge next week     Plan:   1. Inpatient admission to [facility] 2W on a 9.13 legal status  2. Routine checks, no CO indicated in a locked, supervised, inpatient setting  3. Medications: c/w Lamotrigine 150 mg BID for bipolar depression, start Gabapentin 300mg QHS and 100mg qAM for anxiety/pain, Trazadone 100 mg qHS prn insomnia  4. PRNs: Zyprexa 5mg IM/PO, Ativan 2mg PO, Benadryl 50mg IM/PO for agitation, Ativan 0.5mg PO q6hr prn anxiety  5. Lidocaine patch prn back pain, Tylenol/Maalox/MOM prn  6. Group/ Milieu therapy  7. SW to pursue discharge planning
Patient is a 48 yr old woman, single, domiciled and unemployed, with past hx of bipolar disorder, PTSD, borderline personality disorder and Psychogenic Seizures, per PSYCKES Borderline Personality Disorder Unspecified/Other Depressive Disorder Major Depressive Disorder Bipolar I Unspecified/Other Anxiety Disorder Unspecified/Other Bipolar Conversion Disorder PTSD Schizoaffective Disorder, with multiple inpatient psychiatric admissions and ED visits, last seen at [facility] at 4/25/2022 ED, including recent hospitalizations 4/28/2004 to 5/28/2004 to [facility] [place] (?) per PSYCKES, 6/20/2021 to 6/25/2021 to [facility] for acute judson, 6/9/2023 to 6/23/2023 to [facility], 4/4/2022 to [facility] Low 3 due to worsening anxiety and did not feel her pain was being properly managed on the unit, demanding discharge and provided 3DL. Pt became agitated and accused staff of keeping her a prisoner.  Pt later on submitted a 3DL letter requesting release on 4/5/22. Per records hx of suicide attempt vs gesture - superficially cut her neck with glass (which did not require suturing) in 2014. there is no hx of illicit substance.  Pertinent medical issues: endometriosis, chronic pains and ? seizure disorder.  Today, presented to the hospital with worsening depressed mood.      Patient endorsing anxious mood, chronic passive suicidal ideations, denies intent or plan.  Pt agrees to come to staff immediately with any safety concerns. Pt help seeking and rejecting. Pt agrees to c/w increase in gabapentin to 300 mg PO BID. Dispo planning discussed - pr requests AOPD referral and continue therapy with her current therapist       Plan:   1. Inpatient admission to [facility] 2W on a 9.13 legal status  2. Routine checks, no CO indicated in a locked, supervised, inpatient setting  3. Medications: c/w Lamotrigine 150 mg BID for bipolar depression, start Gabapentin 300mg QHS and 100mg qAM for anxiety/pain, Trazadone 100 mg qHS prn insomnia  4. PRNs: Zyprexa 5mg IM/PO, Ativan 2mg PO, Benadryl 50mg IM/PO for agitation, Ativan 0.5mg PO q6hr prn anxiety  5. Lidocaine patch prn back pain, Tylenol/Maalox/MOM prn  6. Group/ Milieu therapy  7. SW to pursue discharge planning
Patient is a 48 yr old woman, single, domiciled and unemployed, with past hx of bipolar disorder, PTSD, borderline personality disorder and Psychogenic Seizures, per PSYCKES Borderline Personality Disorder Unspecified/Other Depressive Disorder Major Depressive Disorder Bipolar I Unspecified/Other Anxiety Disorder Unspecified/Other Bipolar Conversion Disorder PTSD Schizoaffective Disorder, with multiple inpatient psychiatric admissions and ED visits, last seen at [facility] at 4/25/2022 ED, including recent hospitalizations 4/28/2004 to 5/28/2004 to [facility] [place] (?) per PSYCKES, 6/20/2021 to 6/25/2021 to [facility] for acute judson, 6/9/2023 to 6/23/2023 to [facility], 4/4/2022 to [facility] Low 3 due to worsening anxiety and did not feel her pain was being properly managed on the unit, demanding discharge and provided 3DL. Pt became agitated and accused staff of keeping her a prisoner.  Pt later on submitted a 3DL letter requesting release on 4/5/22. Per records hx of suicide attempt vs gesture - superficially cut her neck with glass (which did not require suturing) in 2014. there is no hx of illicit substance.  Pertinent medical issues: endometriosis, chronic pains and ? seizure disorder.  Today, presented to the hospital with worsening depressed mood.      Patient endorsing anxious mood, chronic passive suicidal ideations, denies intent or plan.  Pt agrees to come to staff immediately with any safety concerns. Pt help seeking and rejecting. Pt agrees to c/w increase in gabapentin to 300 mg PO BID. Dispo planning discussed - pr requests AOPD referral and continue therapy with her current therapist. Discussed breaking down tasks into small, doable steps. Pt to work on this over the weekend       Plan:   1. Inpatient admission to [facility] 2W on a 9.13 legal status  2. Routine checks, no CO indicated in a locked, supervised, inpatient setting  3. Medications: c/w Lamotrigine 150 mg BID for bipolar depression, start Gabapentin 300mg QHS and 100mg qAM for anxiety/pain, Trazadone 100 mg qHS prn insomnia  4. PRNs: Zyprexa 5mg IM/PO, Ativan 2mg PO, Benadryl 50mg IM/PO for agitation, Ativan 0.5mg PO q6hr prn anxiety  5. Lidocaine patch prn back pain, Tylenol/Maalox/MOM prn  6. Group/ Milieu therapy  7. SW to pursue discharge planning
Patient is a 48 yr old woman, single, domiciled and unemployed, with past hx of bipolar disorder, PTSD, borderline personality disorder and Psychogenic Seizures, per PSYCKES Borderline Personality Disorder Unspecified/Other Depressive Disorder Major Depressive Disorder Bipolar I Unspecified/Other Anxiety Disorder Unspecified/Other Bipolar Conversion Disorder PTSD Schizoaffective Disorder, with multiple inpatient psychiatric admissions and ED visits, last seen at [facility] at 4/25/2022 ED, including recent hospitalizations 4/28/2004 to 5/28/2004 to [facility] [place] (?) per PSYCKES, 6/20/2021 to 6/25/2021 to [facility] for acute judson, 6/9/2023 to 6/23/2023 to [facility], 4/4/2022 to [facility] Low 3 due to worsening anxiety and did not feel her pain was being properly managed on the unit, demanding discharge and provided 3DL. Pt became agitated and accused staff of keeping her a prisoner.  Pt later on submitted a 3DL letter requesting release on 4/5/22. Per records hx of suicide attempt vs gesture - superficially cut her neck with glass (which did not require suturing) in 2014. there is no hx of illicit substance.  Pertinent medical issues: endometriosis, chronic pains and ? seizure disorder.  Today, presented to the hospital with worsening depressed mood.      Patient endorsing depressed and anxious moods, hopelessness and suicidal ideations.  Patient unable to engage in meaningful safety planning.  Patient is an acute danger to self and others at this time.  Patient lacks insight and judgment into illness and remains an acute safety risk and warrants inpatient psychiatric hospitalization for safety and stabilization.      Plan:   1. Inpatient admission to [facility] 2W on a 9.13 legal status  2. Routine checks, no CO indicated in a locked, supervised, inpatient setting  3. Medications: c/w Lamotrigine 150 mg BID for bipolar depression, start Gabapentin 300mg QHS and 100mg qAM for anxiety/pain, Trazadone 100 mg qHS prn insomnia  4. PRNs: Zyprexa 5mg IM/PO, Ativan 2mg PO, Benadryl 50mg IM/PO for agitation, Ativan 0.5mg PO q6hr prn anxiety  5. Lidocaine patch prn back pain, Tylenol/Maalox/MOM prn  6. Group/ Milieu therapy  7. SW to pursue discharge planning
Patient is a 48 yr old woman, single, domiciled and unemployed, with past hx of bipolar disorder, PTSD, borderline personality disorder and Psychogenic Seizures, per PSYCKES Borderline Personality Disorder Unspecified/Other Depressive Disorder Major Depressive Disorder Bipolar I Unspecified/Other Anxiety Disorder Unspecified/Other Bipolar Conversion Disorder PTSD Schizoaffective Disorder, with multiple inpatient psychiatric admissions and ED visits, last seen at [facility] at 4/25/2022 ED, including recent hospitalizations 4/28/2004 to 5/28/2004 to [facility] [place] (?) per PSYCKES, 6/20/2021 to 6/25/2021 to [facility] for acute judson, 6/9/2023 to 6/23/2023 to [facility], 4/4/2022 to [facility] Low 3 due to worsening anxiety and did not feel her pain was being properly managed on the unit, demanding discharge and provided 3DL. Pt became agitated and accused staff of keeping her a prisoner.  Pt later on submitted a 3DL letter requesting release on 4/5/22. Per records hx of suicide attempt vs gesture - superficially cut her neck with glass (which did not require suturing) in 2014. there is no hx of illicit substance.  Pertinent medical issues: endometriosis, chronic pains and ? seizure disorder.  Today, presented to the hospital with worsening depressed mood.      Patient with chronic passive suicidal ideations, denies intent or plan.  Pt agrees to come to staff immediately with any safety concerns. Pt noted take wellbutrin 150 mg PO QD today. COVID+ 4/28/24 and 5/5/24; pt completed 10 days of COVID precautions. Dipso: AOPD appt in June, will obtain crisis clinic appt when pt endorses improvement in sxs. Pt refused respite referral. Projected discharge tomorrow    Plan:   1. Inpatient admission to [facility] 2W on a 9.13 legal status  2. Routine checks, no CO indicated in a locked, supervised, inpatient setting  3. Medications: c/w Lamotrigine 150 mg BID for bipolar depression, start Gabapentin 300mg QHS and 100mg qAM for anxiety/pain, Trazadone 100 mg qHS prn insomnia  4. PRNs: Zyprexa 5mg IM/PO, Ativan 2mg PO, Benadryl 50mg IM/PO for agitation, Ativan 0.5mg PO q6hr prn anxiety  5. Lidocaine patch prn back pain, Tylenol/Maalox/MOM prn  6. Group/ Milieu therapy  7. SW to pursue discharge planning
Patient is a 48 yr old woman, single, domiciled and unemployed, with past hx of bipolar disorder, PTSD, borderline personality disorder and Psychogenic Seizures, per PSYCKES Borderline Personality Disorder Unspecified/Other Depressive Disorder Major Depressive Disorder Bipolar I Unspecified/Other Anxiety Disorder Unspecified/Other Bipolar Conversion Disorder PTSD Schizoaffective Disorder, with multiple inpatient psychiatric admissions and ED visits, last seen at [facility] at 4/25/2022 ED, including recent hospitalizations 4/28/2004 to 5/28/2004 to [facility] [place] (?) per PSYCKES, 6/20/2021 to 6/25/2021 to [facility] for acute judson, 6/9/2023 to 6/23/2023 to [facility], 4/4/2022 to [facility] Low 3 due to worsening anxiety and did not feel her pain was being properly managed on the unit, demanding discharge and provided 3DL. Pt became agitated and accused staff of keeping her a prisoner.  Pt later on submitted a 3DL letter requesting release on 4/5/22. Per records hx of suicide attempt vs gesture - superficially cut her neck with glass (which did not require suturing) in 2014. there is no hx of illicit substance.  Pertinent medical issues: endometriosis, chronic pains and ? seizure disorder.  Today, presented to the hospital with worsening depressed mood.      Patient endorsing depressed and anxious moods, hopelessness and suicidal ideations.  Patient unable to engage in meaningful safety planning.  Patient is an acute danger to self and others at this time.  Patient lacks insight and judgment into illness and remains an acute safety risk and warrants inpatient psychiatric hospitalization for safety and stabilization.      Plan:   1. Inpatient admission to [facility] 2W on a 9.13 legal status  2. Routine checks, no CO indicated in a locked, supervised, inpatient setting  3. Medications: c/w Lamotrigine 150 mg BID for bipolar depression, start Gabapentin 300mg QHS and 100mg qAM for anxiety/pain, Trazadone 100 mg qHS prn insomnia  4. PRNs: Zyprexa 5mg IM/PO, Ativan 2mg PO, Benadryl 50mg IM/PO for agitation, Ativan 0.5mg PO q6hr prn anxiety  5. Lidocaine patch prn back pain, Tylenol/Maalox/MOM prn  6. Group/ Milieu therapy  7. SW to pursue discharge planning
Patient is a 48 yr old woman, single, domiciled and unemployed, with past hx of bipolar disorder, PTSD, borderline personality disorder and Psychogenic Seizures, per PSYCKES Borderline Personality Disorder Unspecified/Other Depressive Disorder Major Depressive Disorder Bipolar I Unspecified/Other Anxiety Disorder Unspecified/Other Bipolar Conversion Disorder PTSD Schizoaffective Disorder, with multiple inpatient psychiatric admissions and ED visits, last seen at [facility] at 4/25/2022 ED, including recent hospitalizations 4/28/2004 to 5/28/2004 to [facility] [place] (?) per PSYCKES, 6/20/2021 to 6/25/2021 to [facility] for acute judson, 6/9/2023 to 6/23/2023 to [facility], 4/4/2022 to [facility] Low 3 due to worsening anxiety and did not feel her pain was being properly managed on the unit, demanding discharge and provided 3DL. Pt became agitated and accused staff of keeping her a prisoner.  Pt later on submitted a 3DL letter requesting release on 4/5/22. Per records hx of suicide attempt vs gesture - superficially cut her neck with glass (which did not require suturing) in 2014. there is no hx of illicit substance.  Pertinent medical issues: endometriosis, chronic pains and ? seizure disorder.  Today, presented to the hospital with worsening depressed mood.      Patient endorsing depressed and anxious moods, hopelessness and suicidal ideations.  Pt agrees to come to staff immediately with any safety concerns. Pt help seeking and rejecting. Pt agrees to re-start gabapentin 100 mg PO QD and 300 mg PO QHS. Pt declines alternative medications discussed. Pt noted to deflect any problem solving or discussion of coping mechanisms      Plan:   1. Inpatient admission to [facility] 2W on a 9.13 legal status  2. Routine checks, no CO indicated in a locked, supervised, inpatient setting  3. Medications: c/w Lamotrigine 150 mg BID for bipolar depression, start Gabapentin 300mg QHS and 100mg qAM for anxiety/pain, Trazadone 100 mg qHS prn insomnia  4. PRNs: Zyprexa 5mg IM/PO, Ativan 2mg PO, Benadryl 50mg IM/PO for agitation, Ativan 0.5mg PO q6hr prn anxiety  5. Lidocaine patch prn back pain, Tylenol/Maalox/MOM prn  6. Group/ Milieu therapy  7. SW to pursue discharge planning
Patient is a 48 yr old woman, single, domiciled and unemployed, with past hx of bipolar disorder, PTSD, borderline personality disorder and Psychogenic Seizures, per PSYCKES Borderline Personality Disorder Unspecified/Other Depressive Disorder Major Depressive Disorder Bipolar I Unspecified/Other Anxiety Disorder Unspecified/Other Bipolar Conversion Disorder PTSD Schizoaffective Disorder, with multiple inpatient psychiatric admissions and ED visits, last seen at [facility] at 4/25/2022 ED, including recent hospitalizations 4/28/2004 to 5/28/2004 to [facility] (?) per PSYCKES, 6/20/2021 to 6/25/2021 to [facility] for acute judson, 6/9/2023 to 6/23/2023 to [facility], 4/4/2022 to [facility] Low 3 due to worsening anxiety and did not feel her pain was being properly managed on the unit, demanding discharge and provided 3DL. Pt became agitated and accused staff of keeping her a prisoner.  Pt later on submitted a 3DL letter requesting release on 4/5/22. Per records hx of suicide attempt vs gesture - superficially cut her neck with glass (which did not require suturing) in 2014. there is no hx of illicit substance.  Pertinent medical issues: endometriosis, chronic pains and ? seizure disorder.  Today, presented to the hospital with worsening depressed mood.      Patient endorsing fatalistic thinking that nothing will change but pt also unwilling to enact any changes in her life, chronic passive suicidal ideations, denies intent or plan.  Pt agrees to come to staff immediately with any safety concerns. Pt help seeking and rejecting. Pt agrees to increase in gabapentin to 300 mg PO TID and start lexapro 5 mg PO QD. COVID+ 4/28/24 and 5/5/24; will c/w COVID precautions. Dipso: AOPD appt in June, will obtain crisis clinic appt when pt endorses improvement in sxs.     Plan:   1. Inpatient admission to [facility] 2W on a 9.13 legal status  2. Routine checks, no CO indicated in a locked, supervised, inpatient setting  3. Medications: c/w Lamotrigine 150 mg BID for bipolar depression, start Gabapentin 300mg QHS and 100mg qAM for anxiety/pain, Trazadone 100 mg qHS prn insomnia  4. PRNs: Zyprexa 5mg IM/PO, Ativan 2mg PO, Benadryl 50mg IM/PO for agitation, Ativan 0.5mg PO q6hr prn anxiety  5. Lidocaine patch prn back pain, Tylenol/Maalox/MOM prn  6. Group/ Milieu therapy  7. SW to pursue discharge planning
Patient is a 48 yr old woman, single, domiciled and unemployed, with past hx of bipolar disorder, PTSD, borderline personality disorder and Psychogenic Seizures, per PSYCKES Borderline Personality Disorder Unspecified/Other Depressive Disorder Major Depressive Disorder Bipolar I Unspecified/Other Anxiety Disorder Unspecified/Other Bipolar Conversion Disorder PTSD Schizoaffective Disorder, with multiple inpatient psychiatric admissions and ED visits, last seen at [facility] at 4/25/2022 ED, including recent hospitalizations 4/28/2004 to 5/28/2004 to [facility] (?) per PSYCKES, 6/20/2021 to 6/25/2021 to [facility] for acute judson, 6/9/2023 to 6/23/2023 to [facility], 4/4/2022 to [facility] Low 3 due to worsening anxiety and did not feel her pain was being properly managed on the unit, demanding discharge and provided 3DL. Pt became agitated and accused staff of keeping her a prisoner.  Pt later on submitted a 3DL letter requesting release on 4/5/22. Per records hx of suicide attempt vs gesture - superficially cut her neck with glass (which did not require suturing) in 2014. there is no hx of illicit substance.  Pertinent medical issues: endometriosis, chronic pains and ? seizure disorder.  Today, presented to the hospital with worsening depressed mood.      Patient endorsing belief that nothing will change but pt also unwilling to enact any changes in her life, chronic passive suicidal ideations, denies intent or plan.  Pt agrees to come to staff immediately with any safety concerns. Pt help seeking and rejecting. Pt agrees to c/w increase in gabapentin to 300 mg PO TID and increase lexapro to 10 mg PO QD. COVID+ 4/28/24 and 5/5/24; will c/w COVID precautions. Dipso: AOPD appt in June, will obtain crisis clinic appt when pt endorses improvement in sxs. Consider respite as pt does not like her home environment and needs more supportive care     Plan:   1. Inpatient admission to [facility] 2W on a 9.13 legal status  2. Routine checks, no CO indicated in a locked, supervised, inpatient setting  3. Medications: c/w Lamotrigine 150 mg BID for bipolar depression, start Gabapentin 300mg QHS and 100mg qAM for anxiety/pain, Trazadone 100 mg qHS prn insomnia  4. PRNs: Zyprexa 5mg IM/PO, Ativan 2mg PO, Benadryl 50mg IM/PO for agitation, Ativan 0.5mg PO q6hr prn anxiety  5. Lidocaine patch prn back pain, Tylenol/Maalox/MOM prn  6. Group/ Milieu therapy  7. SW to pursue discharge planning
Patient is a 48 yr old woman, single, domiciled and unemployed, with past hx of bipolar disorder, PTSD, borderline personality disorder and Psychogenic Seizures, per PSYCKES Borderline Personality Disorder Unspecified/Other Depressive Disorder Major Depressive Disorder Bipolar I Unspecified/Other Anxiety Disorder Unspecified/Other Bipolar Conversion Disorder PTSD Schizoaffective Disorder, with multiple inpatient psychiatric admissions and ED visits, last seen at [facility] at 4/25/2022 ED, including recent hospitalizations 4/28/2004 to 5/28/2004 to [facility] (?) per PSYCKES, 6/20/2021 to 6/25/2021 to [facility] for acute judson, 6/9/2023 to 6/23/2023 to [facility], 4/4/2022 to [facility] Low 3 due to worsening anxiety and did not feel her pain was being properly managed on the unit, demanding discharge and provided 3DL. Pt became agitated and accused staff of keeping her a prisoner.  Pt later on submitted a 3DL letter requesting release on 4/5/22. Per records hx of suicide attempt vs gesture - superficially cut her neck with glass (which did not require suturing) in 2014. there is no hx of illicit substance.  Pertinent medical issues: endometriosis, chronic pains and ? seizure disorder.  Today, presented to the hospital with worsening depressed mood.      Patient remains help seeking and help rejecting, chronic passive suicidal ideations, denies intent or plan.  Pt agrees to come to staff immediately with any safety concerns. Pt noted to stop wellbutrin 150 mg PO QD over the weekend and unable to explain why. Pt restarted today. COVID+ 4/28/24 and 5/5/24; pt completed 10 days of COVID precautions. PDipso: AOPD appt in June, will obtain crisis clinic appt when pt endorses improvement in sxs. Pt refused respite referral. Projected discharge this week     Plan:   1. Inpatient admission to [facility] 2W on a 9.13 legal status  2. Routine checks, no CO indicated in a locked, supervised, inpatient setting  3. Medications: c/w Lamotrigine 150 mg BID for bipolar depression, start Gabapentin 300mg QHS and 100mg qAM for anxiety/pain, Trazadone 100 mg qHS prn insomnia  4. PRNs: Zyprexa 5mg IM/PO, Ativan 2mg PO, Benadryl 50mg IM/PO for agitation, Ativan 0.5mg PO q6hr prn anxiety  5. Lidocaine patch prn back pain, Tylenol/Maalox/MOM prn  6. Group/ Milieu therapy  7. SW to pursue discharge planning
Patient is a 48 yr old woman, single, domiciled and unemployed, with past hx of bipolar disorder, PTSD, borderline personality disorder and Psychogenic Seizures, per PSYCKES Borderline Personality Disorder Unspecified/Other Depressive Disorder Major Depressive Disorder Bipolar I Unspecified/Other Anxiety Disorder Unspecified/Other Bipolar Conversion Disorder PTSD Schizoaffective Disorder, with multiple inpatient psychiatric admissions and ED visits, last seen at [facility] at 4/25/2022 ED, including recent hospitalizations 4/28/2004 to 5/28/2004 to [facility] [place] [facility] (?) per PSYCKES, 6/20/2021 to 6/25/2021 to [facility] for acute judson, 6/9/2023 to 6/23/2023 to [facility], 4/4/2022 to [facility] Low 3 due to worsening anxiety and did not feel her pain was being properly managed on the unit, demanding discharge and provided 3DL. Pt became agitated and accused staff of keeping her a prisoner.  Pt later on submitted a 3DL letter requesting release on 4/5/22. Per records hx of suicide attempt vs gesture - superficially cut her neck with glass (which did not require suturing) in 2014. there is no hx of illicit substance.  Pertinent medical issues: endometriosis, chronic pains and ? seizure disorder.  Today, presented to the hospital with worsening depressed mood.      Patient endorsing anxious mood, chronic passive suicidal ideations, denies intent or plan.  Pt agrees to come to staff immediately with any safety concerns. Pt help seeking and rejecting. Pt agrees to increase in gabapentin to 300 mg PO TID and start lexapro 5 mg PO QD. COVID+ 4/28/24, re-test 5/5/24 also positive; will c/w COVID precautions.      Plan:   1. Inpatient admission to [facility] 2W on a 9.13 legal status  2. Routine checks, no CO indicated in a locked, supervised, inpatient setting  3. Medications: c/w Lamotrigine 150 mg BID for bipolar depression, start Gabapentin 300mg QHS and 100mg qAM for anxiety/pain, Trazadone 100 mg qHS prn insomnia  4. PRNs: Zyprexa 5mg IM/PO, Ativan 2mg PO, Benadryl 50mg IM/PO for agitation, Ativan 0.5mg PO q6hr prn anxiety  5. Lidocaine patch prn back pain, Tylenol/Maalox/MOM prn  6. Group/ Milieu therapy  7. SW to pursue discharge planning

## 2024-05-15 RX ADMIN — GABAPENTIN 400 MILLIGRAM(S): 400 CAPSULE ORAL at 09:28

## 2024-05-15 RX ADMIN — Medication 50 MILLIGRAM(S): at 00:19

## 2024-05-15 RX ADMIN — BUPROPION HYDROCHLORIDE 150 MILLIGRAM(S): 150 TABLET, EXTENDED RELEASE ORAL at 09:27

## 2024-05-15 RX ADMIN — Medication 500 MILLIGRAM(S): at 09:27

## 2024-05-15 RX ADMIN — PANTOPRAZOLE SODIUM 40 MILLIGRAM(S): 20 TABLET, DELAYED RELEASE ORAL at 09:27

## 2024-05-15 RX ADMIN — LAMOTRIGINE 150 MILLIGRAM(S): 25 TABLET, ORALLY DISINTEGRATING ORAL at 09:27

## 2024-05-23 ENCOUNTER — OUTPATIENT (OUTPATIENT)
Dept: OUTPATIENT SERVICES | Facility: HOSPITAL | Age: 49
LOS: 1 days | Discharge: ANOTHER HOSPITAL | End: 2024-05-23
Payer: MEDICARE

## 2024-05-23 PROCEDURE — 99215 OFFICE O/P EST HI 40 MIN: CPT

## 2024-05-23 PROCEDURE — 90839 PSYTX CRISIS INITIAL 60 MIN: CPT

## 2024-05-23 NOTE — SOCIAL WORK POST DISCHARGE FOLLOW UP NOTE - NSBHSWFOLLOWUP_PSY_ALL_CORE_FT
Patient did not attend scheduled Bridge appointment. Writer called Patient to follow-up regarding this. Patient did not answer. Writer left VM for Patient to return call to assist in rescheduling appointment.

## 2024-05-27 NOTE — BH TREATMENT PLAN - NSTXDEPRESPROGRES_PSY_ALL_CORE
Told provider that dinner sliding scale was used prior when pt was not feeling well. Provider called at [phone] to clarify sliding scale dose. Provider relayed that no need for stat dose, will add a bed time dose. Improving

## 2024-05-28 DIAGNOSIS — F41.9 ANXIETY DISORDER, UNSPECIFIED: ICD-10-CM

## 2024-07-08 NOTE — ED PROVIDER NOTE - NSBENEFITOFTRANSFER_ED_A_ED
Requested medication(s) are due for refill today: Yes  Patient has already received a courtesy refill: No  Other reason request has been forwarded to provider:   
Continuity of Care at Other Facility

## 2024-08-05 ENCOUNTER — INPATIENT (INPATIENT)
Facility: HOSPITAL | Age: 49
LOS: 1 days | Discharge: HOME | End: 2024-08-07
Attending: HOSPITALIST | Admitting: HOSPITALIST
Payer: MEDICARE

## 2024-08-05 VITALS
TEMPERATURE: 98 F | HEART RATE: 88 BPM | DIASTOLIC BLOOD PRESSURE: 89 MMHG | WEIGHT: 154.98 LBS | OXYGEN SATURATION: 100 % | RESPIRATION RATE: 20 BRPM | SYSTOLIC BLOOD PRESSURE: 131 MMHG

## 2024-08-05 DIAGNOSIS — F31.9 BIPOLAR DISORDER, UNSPECIFIED: ICD-10-CM

## 2024-08-05 DIAGNOSIS — Z29.9 ENCOUNTER FOR PROPHYLACTIC MEASURES, UNSPECIFIED: ICD-10-CM

## 2024-08-05 DIAGNOSIS — K21.9 GASTRO-ESOPHAGEAL REFLUX DISEASE WITHOUT ESOPHAGITIS: ICD-10-CM

## 2024-08-05 DIAGNOSIS — R56.9 UNSPECIFIED CONVULSIONS: ICD-10-CM

## 2024-08-05 LAB
ADD ON TEST-SPECIMEN IN LAB: SIGNIFICANT CHANGE UP
ALBUMIN SERPL ELPH-MCNC: 4.3 G/DL — SIGNIFICANT CHANGE UP (ref 3.3–5)
ALP SERPL-CCNC: 107 U/L — SIGNIFICANT CHANGE UP (ref 40–120)
ALT FLD-CCNC: 9 U/L — SIGNIFICANT CHANGE UP (ref 4–33)
ANION GAP SERPL CALC-SCNC: 14 MMOL/L — SIGNIFICANT CHANGE UP (ref 7–14)
APPEARANCE UR: ABNORMAL
AST SERPL-CCNC: 22 U/L — SIGNIFICANT CHANGE UP (ref 4–32)
BACTERIA # UR AUTO: ABNORMAL /HPF
BASOPHILS # BLD AUTO: 0.02 K/UL — SIGNIFICANT CHANGE UP (ref 0–0.2)
BASOPHILS NFR BLD AUTO: 0.3 % — SIGNIFICANT CHANGE UP (ref 0–2)
BILIRUB SERPL-MCNC: 0.5 MG/DL — SIGNIFICANT CHANGE UP (ref 0.2–1.2)
BILIRUB UR-MCNC: ABNORMAL
BUN SERPL-MCNC: 8 MG/DL — SIGNIFICANT CHANGE UP (ref 7–23)
CALCIUM SERPL-MCNC: 10.2 MG/DL — SIGNIFICANT CHANGE UP (ref 8.4–10.5)
CAST: 9 /LPF — HIGH (ref 0–4)
CHLORIDE SERPL-SCNC: 103 MMOL/L — SIGNIFICANT CHANGE UP (ref 98–107)
CO2 SERPL-SCNC: 19 MMOL/L — LOW (ref 22–31)
COLOR SPEC: SIGNIFICANT CHANGE UP
COMMENT - URINE: SIGNIFICANT CHANGE UP
CREAT SERPL-MCNC: 0.82 MG/DL — SIGNIFICANT CHANGE UP (ref 0.5–1.3)
DIFF PNL FLD: NEGATIVE — SIGNIFICANT CHANGE UP
EGFR: 88 ML/MIN/1.73M2 — SIGNIFICANT CHANGE UP
EOSINOPHIL # BLD AUTO: 0 K/UL — SIGNIFICANT CHANGE UP (ref 0–0.5)
EOSINOPHIL NFR BLD AUTO: 0 % — SIGNIFICANT CHANGE UP (ref 0–6)
EPI CELLS # UR: PRESENT
GLUCOSE SERPL-MCNC: 100 MG/DL — HIGH (ref 70–99)
GLUCOSE UR QL: NEGATIVE MG/DL — SIGNIFICANT CHANGE UP
HCT VFR BLD CALC: 41.5 % — SIGNIFICANT CHANGE UP (ref 34.5–45)
HGB BLD-MCNC: 14.3 G/DL — SIGNIFICANT CHANGE UP (ref 11.5–15.5)
HYALINE CASTS # UR AUTO: PRESENT
IANC: 4.01 K/UL — SIGNIFICANT CHANGE UP (ref 1.8–7.4)
IMM GRANULOCYTES NFR BLD AUTO: 0.3 % — SIGNIFICANT CHANGE UP (ref 0–0.9)
KETONES UR-MCNC: ABNORMAL MG/DL
LACTATE SERPL-SCNC: 1 MMOL/L — SIGNIFICANT CHANGE UP (ref 0.5–2)
LEUKOCYTE ESTERASE UR-ACNC: ABNORMAL
LYMPHOCYTES # BLD AUTO: 2.07 K/UL — SIGNIFICANT CHANGE UP (ref 1–3.3)
LYMPHOCYTES # BLD AUTO: 30.5 % — SIGNIFICANT CHANGE UP (ref 13–44)
MAGNESIUM SERPL-MCNC: 2.2 MG/DL — SIGNIFICANT CHANGE UP (ref 1.6–2.6)
MCHC RBC-ENTMCNC: 30.4 PG — SIGNIFICANT CHANGE UP (ref 27–34)
MCHC RBC-ENTMCNC: 34.5 GM/DL — SIGNIFICANT CHANGE UP (ref 32–36)
MCV RBC AUTO: 88.3 FL — SIGNIFICANT CHANGE UP (ref 80–100)
MONOCYTES # BLD AUTO: 0.67 K/UL — SIGNIFICANT CHANGE UP (ref 0–0.9)
MONOCYTES NFR BLD AUTO: 9.9 % — SIGNIFICANT CHANGE UP (ref 2–14)
MUCOUS THREADS # UR AUTO: PRESENT
NEUTROPHILS # BLD AUTO: 4.01 K/UL — SIGNIFICANT CHANGE UP (ref 1.8–7.4)
NEUTROPHILS NFR BLD AUTO: 59 % — SIGNIFICANT CHANGE UP (ref 43–77)
NITRITE UR-MCNC: NEGATIVE — SIGNIFICANT CHANGE UP
NRBC # BLD: 0 /100 WBCS — SIGNIFICANT CHANGE UP (ref 0–0)
NRBC # FLD: 0 K/UL — SIGNIFICANT CHANGE UP (ref 0–0)
PH UR: 6 — SIGNIFICANT CHANGE UP (ref 5–8)
PHOSPHATE SERPL-MCNC: 2.8 MG/DL — SIGNIFICANT CHANGE UP (ref 2.5–4.5)
PLATELET # BLD AUTO: 271 K/UL — SIGNIFICANT CHANGE UP (ref 150–400)
POTASSIUM SERPL-MCNC: 4.7 MMOL/L — SIGNIFICANT CHANGE UP (ref 3.5–5.3)
POTASSIUM SERPL-SCNC: 4.7 MMOL/L — SIGNIFICANT CHANGE UP (ref 3.5–5.3)
PROT SERPL-MCNC: 7 G/DL — SIGNIFICANT CHANGE UP (ref 6–8.3)
PROT UR-MCNC: 30 MG/DL
RBC # BLD: 4.7 M/UL — SIGNIFICANT CHANGE UP (ref 3.8–5.2)
RBC # FLD: 12.8 % — SIGNIFICANT CHANGE UP (ref 10.3–14.5)
RBC CASTS # UR COMP ASSIST: < 5 /HPF — SIGNIFICANT CHANGE UP (ref 0–4)
REVIEW: SIGNIFICANT CHANGE UP
SODIUM SERPL-SCNC: 136 MMOL/L — SIGNIFICANT CHANGE UP (ref 135–145)
SP GR SPEC: 1.04 — HIGH (ref 1–1.03)
SQUAMOUS # UR AUTO: 18 /HPF — HIGH (ref 0–5)
UROBILINOGEN FLD QL: 1 MG/DL — SIGNIFICANT CHANGE UP (ref 0.2–1)
WBC # BLD: 6.79 K/UL — SIGNIFICANT CHANGE UP (ref 3.8–10.5)
WBC # FLD AUTO: 6.79 K/UL — SIGNIFICANT CHANGE UP (ref 3.8–10.5)
WBC UR QL: 4 /HPF — SIGNIFICANT CHANGE UP (ref 0–5)

## 2024-08-05 PROCEDURE — 99285 EMERGENCY DEPT VISIT HI MDM: CPT | Mod: GC

## 2024-08-05 PROCEDURE — 99222 1ST HOSP IP/OBS MODERATE 55: CPT

## 2024-08-05 RX ORDER — OMEPRAZOLE 100 %
1 POWDER (GRAM) MISCELLANEOUS
Refills: 0 | DISCHARGE

## 2024-08-05 RX ORDER — MELATONIN 3 MG
9 TABLET ORAL AT BEDTIME
Refills: 0 | Status: DISCONTINUED | OUTPATIENT
Start: 2024-08-05 | End: 2024-08-07

## 2024-08-05 RX ORDER — GABAPENTIN 400 MG/1
400 CAPSULE ORAL ONCE
Refills: 0 | Status: COMPLETED | OUTPATIENT
Start: 2024-08-05 | End: 2024-08-05

## 2024-08-05 RX ORDER — PANTOPRAZOLE SODIUM 20 MG/1
40 TABLET, DELAYED RELEASE ORAL
Refills: 0 | Status: DISCONTINUED | OUTPATIENT
Start: 2024-08-05 | End: 2024-08-07

## 2024-08-05 RX ORDER — BACTERIOSTATIC SODIUM CHLORIDE 0.9 %
1000 VIAL (ML) INJECTION ONCE
Refills: 0 | Status: COMPLETED | OUTPATIENT
Start: 2024-08-05 | End: 2024-08-05

## 2024-08-05 RX ORDER — MELATONIN 3 MG
3 TABLET ORAL AT BEDTIME
Refills: 0 | Status: DISCONTINUED | OUTPATIENT
Start: 2024-08-05 | End: 2024-08-05

## 2024-08-05 RX ORDER — LORAZEPAM 1 MG/1
2 TABLET ORAL ONCE
Refills: 0 | Status: DISCONTINUED | OUTPATIENT
Start: 2024-08-05 | End: 2024-08-07

## 2024-08-05 RX ORDER — GABAPENTIN 400 MG/1
400 CAPSULE ORAL
Refills: 0 | Status: DISCONTINUED | OUTPATIENT
Start: 2024-08-06 | End: 2024-08-07

## 2024-08-05 RX ORDER — RIBOFLAVIN 5-PHOSPHATE SOD(B2)
1 POWDER (GRAM) MISCELLANEOUS
Refills: 0 | DISCHARGE

## 2024-08-05 RX ORDER — LAMOTRIGINE 100 MG/1
150 TABLET ORAL
Refills: 0 | Status: DISCONTINUED | OUTPATIENT
Start: 2024-08-05 | End: 2024-08-07

## 2024-08-05 RX ORDER — LAMOTRIGINE 100 MG/1
150 TABLET ORAL ONCE
Refills: 0 | Status: COMPLETED | OUTPATIENT
Start: 2024-08-05 | End: 2024-08-05

## 2024-08-05 RX ORDER — ACETAMINOPHEN 500 MG
650 TABLET ORAL EVERY 6 HOURS
Refills: 0 | Status: DISCONTINUED | OUTPATIENT
Start: 2024-08-05 | End: 2024-08-07

## 2024-08-05 RX ADMIN — LAMOTRIGINE 150 MILLIGRAM(S): 100 TABLET ORAL at 09:11

## 2024-08-05 RX ADMIN — Medication 2000 MILLILITER(S): at 06:03

## 2024-08-05 RX ADMIN — LAMOTRIGINE 150 MILLIGRAM(S): 100 TABLET ORAL at 21:13

## 2024-08-05 RX ADMIN — GABAPENTIN 400 MILLIGRAM(S): 400 CAPSULE ORAL at 14:03

## 2024-08-05 NOTE — H&P ADULT - PROBLEM SELECTOR PLAN 1
- Uncertain if seizure like activity. Has hx of epilepsy and is compliant with taking her Lamotrigine.  - Episode witnessed by me in ED. Initially had a diffuse body jerking self limited x 1. Then a few minutes later, stating she is having absence episode, she is staring straight at me but appears to retain cognition and when asked if she's having an episode, she nods yes. Then verbalizes "it's gone now."  - Neurology Dr. Gill following. Per neuro sxns most c/w psychogenic nonepileptic spells. Recommend:   [] Continuous EEG  [] c/w Lamotrigine 150mg BID (home med)  [] Ativan 2mg IVP for seizure like activity, neuro checks q4h

## 2024-08-05 NOTE — CHART NOTE - NSCHARTNOTEFT_GEN_A_CORE
Code flight called in ED.     49-year-old female with history of epilepsy on lamotrigine, bipolar disorder, endometriosis comes into the ED for evaluation of what she reports to be breakthrough focal seizures.     Patient dressed and attempting to leave ER but cooperated when security stopped her. Pt states she 'wants to go to a hospital where they know how to treat epilepsy.' Pt appeared agitated but cooperated with interview and relaxed once she was listened to. A&Ox4 but appears to be in a manic episode. Patient agreeable to wait for psychiatry to evaluate her.     Patient seen by ED psych. Per Psychiatry doctor, patient wants to stay. patient states 'she is in a manic episode and cannot sleep.' No changes in psych recs awaiting vEEG. Melatonin ordered for sleep.     F/u official  consult in AM. Cont to monitor

## 2024-08-05 NOTE — H&P ADULT - NSHPPHYSICALEXAM_GEN_ALL_CORE
T(C): 37.6 (08-05-24 @ 15:49), Max: 37.6 (08-05-24 @ 15:49)  HR: 72 (08-05-24 @ 17:07) (65 - 88)  BP: 133/82 (08-05-24 @ 17:07) (129/73 - 164/108)  RR: 19 (08-05-24 @ 17:07) (16 - 21)  SpO2: 99% (08-05-24 @ 17:07) (99% - 100%)    CONSTITUTIONAL: Well groomed, no apparent distress  EYES: PERRLA and symmetric, EOMI, No conjunctival or scleral injection, non-icteric  ENMT: Oral mucosa with moist membranes. Normal dentition; no pharyngeal injection or exudates   NECK: Supple, symmetric and without tracheal deviation   RESP: No respiratory distress, no use of accessory muscles; CTA b/l, no WRR  CV: RRR, +S1S2, no MRG; no JVD; no peripheral edema  GI: Soft, NT, ND, no rebound, no guarding  LYMPH: No cervical LAD or tenderness  MSK: Normal gait; No digital clubbing or cyanosis; examination of the (head/neck/spine/ribs/pelvis, RUE, LUE, RLE, LLE) without misalignment,   SKIN: No rashes or ulcers noted; no subcutaneous nodules or induration palpable  NEURO: No focal deficits noted   PSYCH: Appropriate insight/judgment; A+O x 3, elated affected, pressured and tangential speech

## 2024-08-05 NOTE — H&P ADULT - ASSESSMENT
49-year-old female with history of epilepsy on lamotrigine, bipolar disorder, endometriosis comes into the ED for evaluation of what she reports to be breakthrough focal seizures. She follows w/ neuro Dr. Rosas; recent MRI w/ him unremarkable for acute findings (she has results w/ her in MyChart). Neuro following, EMU admission and EEG pending. Psych also consulted.

## 2024-08-05 NOTE — H&P ADULT - HISTORY OF PRESENT ILLNESS
49-year-old female with history of epilepsy on lamotrigine, bipolar disorder, endometriosis comes into the ED for evaluation of what she reports to be breakthrough focal seizures.     Patient evaluated in the ED. She is currently manic, speaking with pressurized speech, elated mood and tangential thoughts. She endorses that recently she started having episodes that appear to be absence seizures. She has been very compliant with taking her Lamotrigine. Per pt these episodes last a few seconds. Initially they were 2 episodes/day, now have increased to 1 episode every hr or more. Pt endorses decreased sleep recently, although that may be her judson. Of note, had an MRI w/ her neurologist about 1 week ago which showed 3 foci of congenital venous malformations stable since 2016, otherwise no acute findings. She follows w/ neuro Dr. Rosas in [facility] ([phone]).     Noted to have an episode while pt being interviewed by me. Initially had a diffuse body jerking self limited x 1. Then a few minutes later, stating she is having absence episode, she is staring straight at me but appears to retain cognition and when asked if she's having an episode, she nods yes. Then verbalizes "it's gone now."    ED Course:   - Labs unremarkable.   - Neuro consulted and recs provided.

## 2024-08-05 NOTE — ED PROVIDER NOTE - OBJECTIVE STATEMENT
49-year-old female with history of epilepsy on lamotrigine, bipolar disorder, endometriosis comes into the ED for evaluation of what she reports to be breakthrough focal seizures.  She states her epilepsy is usually characterized by generalized tonic-clonic seizures but she has not had 1 of these and a couple of years.  She is very diligent with taking her lamotrigine.  Over the past week she has had increased episodes that seem like absance seizures.  These episodes usually last for about 10 seconds.  Initially she was having 2 these episodes per day.  She feels over the past few days but has become 1 episode every couple of hours.  She does not report any recent fevers or chills she has not been feeling unwell recently.  The only change she reports is some decreased sleep over the last few days.  Her neurologist is based out of [place].  She had an MRI w/wo contrast done about a week ago which she has results on MyChart available.

## 2024-08-05 NOTE — ED PROVIDER NOTE - ATTENDING CONTRIBUTION TO CARE
Jass Washington DO:  patient seen and evaluated with the resident.  I was present for key portions of the History & Physical, and I agree with the Impression & Plan. 48 yo f pmh lamotrigine, bipolar disorder, endometriosis, pw ams. Patient reports for last few days patient has had episodes where she feels she is unconscious or having partial seizures.  Went to OSH, had workup including labs, discharged and told to follow-up with GI.  Patient came because she is having persistent symptoms.  Reports symptoms are transient, feels that she is able to speak in disassociates momentarily.  Patient arrives HDS, well-appearing, neurovascular intact.  Will check labs, consider neuroconsult for EEG monitoring.  Would not obtain neuroimaging at this time, patient has had in the past.

## 2024-08-05 NOTE — H&P ADULT - PROBLEM SELECTOR PLAN 2
- hx of bipolar disorder, currently manic. Hx of psych admission w/ SI in 5/2024. Denying any suicidal/homicidal thoughts currently.   - Continuing home Gabapentin 400mg BID.   (no longer on Bupropion or Trazodone)  - Inpatient psych consulted via calling [phone] and leaving .

## 2024-08-05 NOTE — ED ADULT NURSE NOTE - OBJECTIVE STATEMENT
alert oriented no distress c/o intermittent focal seizures ( none right now)  feels "out of it"  skin warm color fair no c/p CP dizziness or SOB

## 2024-08-05 NOTE — ED ADULT TRIAGE NOTE - CHIEF COMPLAINT QUOTE
focal seizure 1 hr PTA- endorsing no complaints at time of triage- no distress- hx epilepsy, bipolar, endometriosis

## 2024-08-05 NOTE — H&P ADULT - PROBLEM SELECTOR PROBLEM 1
Seizure Imiquimod Counseling:  I discussed with the patient the risks of imiquimod including but not limited to erythema, scaling, itching, weeping, crusting, and pain.  Patient understands that the inflammatory response to imiquimod is variable from person to person and was educated regarded proper titration schedule.  If flu-like symptoms develop, patient knows to discontinue the medication and contact us.

## 2024-08-05 NOTE — CONSULT NOTE ADULT - ASSESSMENT
Assessment:  Patient GERALD TRAN is a 49y (1975) with a PMHx significant for epilepsy on lamotrigine 150mg BID, bipolar disorder (recently started on gabapentin 400mg BID) comes in today with episodes of starring which started 8/3 AM which she was concerned were seizures. She has known GTC since 13years of age, follows Dr. Rosas in [facility] ([id number]), last time she had a GTC episode was 1-2years ago. 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 She was recently started on gabapentin 400BID for anxiety by a psychiatry provider outpatient. Other than that she takes omeprazole and medical marijuana for pain. Other then that no recent medication changes. She denies any flulike symptoms, chest pain, abdominal pain, cough, SOB. Starting Saturday afternoon 8/3, she started having 10-15 seconds of blank starring episodes were she was aware of her surroundings but not able to respond or follow commands, no abnormal movements. She has been having these episodes more and more frequently and now has them every 15-20min. She has been having right sided headache intermittently after the episodes. She went to [facility] ER on Saturday night but no significant improvement, so she called her Neurologist's office who recommended increasing the lamotrigine night dose to 200mg(she took that dose once 8/4). She slept for 3 hours last night, no weakness after the sleep but she hasn't been sleeping very well since Saturday. She also denies eating drinking well for last few days and mentions had episodes of lightheadedness while standing or walking for last few days. Examination was remarkable for emotional lability, left sided and truncal jerky movements on eliciting reflexes b/l, otherwise unremarkable. She had an MRI last week which showed 3 foci of congenital venous malformation but no acute changes (information in MyChart)    Impression: Acute starring episodes with post episodic headache with PMH of GTC, PNES appears to be episodes of psychogenic nonepileptic seizure but different semiology versus focal nonmotor seizure with impaired awareness versus migraine with aura(no PMH)    Recs:  []EMU admit  []consider behavioural health consult for emotional lability in PMH of bipolar  []vEEG  []AED(lamotrigine) trough levels- 30 min before next dose  []Toxic/metabolic/infectious work up: CBC, CMP, urine toxicology, UA, urine cx, blood cx, alcohol level, AED levels, CPK, lactate level  []AED recommendations: continue home lamotrigine 150 BID  []First line rescue: 2 mg IV Ativan or 5 mg IV valium PRN STAT for seizure activity or GTC > 3 minutes or significant derangement of vital signs.  []Q4H neurological checks and vital signs  []If pt has a convulsion, please document accurately the length of episode and specifically what the pt was doing paying attention to eye blinking vs. closure, gaze deviation, shaking of extremities, tongue biting, urinary incontinence, any derangements of vital signs  []Seizure, fall and aspiration precautions. Avoid sleep deprivation. Advise pt not to drive, operate heavy machinery, avoid heights, pools, bathtubs, locked doors.   Abortive therapies:  [] if complaining of sustained headcahes- recommend migraine medications (to be given all together at the same time): ketorolac (Toradol) 30mg IV q8h PRN (or acetaminophen 1g IV q8h PRN), metoclopramide (Reglan) 10mg q8h PRN, diphenhydramine (Benadryl) 25mg IV q8h PRN. Please repeat at least 2-3 cycles for medications to be effective.    Case seen and discussed with attending Dr. Gill       Assessment:  Patient GERALD TRAN is a 49y (1975) with a PMHx significant for epilepsy on lamotrigine 150mg BID, bipolar disorder (recently started on gabapentin 400mg BID) comes in today with episodes of starring which started 8/3 AM which she was concerned were seizures. She has known GTC since 13years of age, follows Dr. Rosas in [facility] ([id number]), last time she had a GTC episode was 1-2years ago. 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 She was recently started on gabapentin 400BID for anxiety by a psychiatry provider outpatient. Other than that she takes omeprazole and medical marijuana for pain. Other then that no recent medication changes. She denies any flulike symptoms, chest pain, abdominal pain, cough, SOB. Starting Saturday afternoon 8/3, she started having 10-15 seconds of blank starring episodes were she was aware of her surroundings but not able to respond or follow commands, no abnormal movements. She has been having these episodes more and more frequently and now has them every 15-20min. She has been having right sided headache intermittently after the episodes. She went to [facility] ER on Saturday night but no significant improvement, so she called her Neurologist's office who recommended increasing the lamotrigine night dose to 200mg(she took that dose once 8/4). She slept for 3 hours last night, no weakness after the sleep but she hasn't been sleeping very well since Saturday. She also denies eating drinking well for last few days and mentions had episodes of lightheadedness while standing or walking for last few days. Examination was remarkable for emotional lability, left sided and truncal jerky movements on eliciting reflexes b/l, otherwise unremarkable. She had an MRI last week which showed 3 foci of congenital venous malformation but no acute changes (information in MyChart)    Impression: Acute starring episodes with post episodic headache with PMH of GTC, PNES appears to be episodes of psychogenic nonepileptic seizure but different semiology versus focal nonmotor seizure with impaired awareness versus migraine with aura(no PMH)    Recs:  []EMU admit  []consider behavioural health consult for emotional lability in PMH of bipolar  []vEEG  []AED(lamotrigine) trough levels- 30 min before next dose  []Toxic/metabolic/infectious work up: CBC, CMP, urine toxicology, UA, urine cx, blood cx, alcohol level, AED levels, CPK, lactate level  []AED recommendations: continue home lamotrigine 150 BID  []First line rescue: 2 mg IV Ativan or 5 mg IV valium PRN STAT for seizure activity or GTC > 3 minutes or significant derangement of vital signs.  []Q4H neurological checks and vital signs  []If pt has a convulsion, please document accurately the length of episode and specifically what the pt was doing paying attention to eye blinking vs. closure, gaze deviation, shaking of extremities, tongue biting, urinary incontinence, any derangements of vital signs  []Seizure, fall and aspiration precautions. Avoid sleep deprivation. Advise pt not to drive, operate heavy machinery, avoid heights, pools, bathtubs, locked doors.   Abortive therapies:    Case seen and discussed with attending Dr. Gill       Assessment:  Patient GERALD TRAN is a 49y (1975) with a PMHx significant for epilepsy on lamotrigine 150mg BID, bipolar disorder (recently started on gabapentin 400mg BID) comes in today with episodes of starring which started 8/3 AM which she was concerned were seizures. She has known GTC since 13years of age, follows Dr. Rosas in [facility] ([id number]), last time she had a GTC episode was 1-2years ago. 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 She was recently started on gabapentin 400BID for anxiety by a psychiatry provider outpatient. Other than that she takes omeprazole and medical marijuana for pain. Other then that no recent medication changes. She denies any flulike symptoms, chest pain, abdominal pain, cough, SOB. Starting Saturday afternoon 8/3, she started having 10-15 seconds of blank starring episodes were she was aware of her surroundings but not able to respond or follow commands, no abnormal movements. She has been having these episodes more and more frequently and now has them every 15-20min. She has been having right sided headache intermittently after the episodes. She went to [facility] ER on Saturday night but no significant improvement, so she called her Neurologist's office who recommended increasing the lamotrigine night dose to 200mg(she took that dose once 8/4). She slept for 3 hours last night, no weakness after the sleep but she hasn't been sleeping very well since Saturday. She also denies eating drinking well for last few days and mentions had episodes of lightheadedness while standing or walking for last few days. Examination was remarkable for emotional lability, left sided and truncal jerky movements on eliciting reflexes b/l, otherwise unremarkable. She had an MRI last week which showed 3 foci of congenital venous malformation but no acute changes (information in MyChart)    Impression: Acute starring episodes with post episodic headache with PMH of GTC, PNES appears to be episodes of psychogenic nonepileptic seizure but different semiology versus focal nonmotor seizure with impaired awareness versus migraine with aura(no PMH)    Recs:  []EMU admit  []consider behavioural health consult for emotional lability in PMH of bipolar  []vEEG  []AED(lamotrigine) trough levels- 30 min before next dose  []Toxic/metabolic/infectious work up: CBC, CMP, urine toxicology, UA, urine cx, blood cx, alcohol level, AED levels, CPK, lactate level  []AED recommendations: continue home lamotrigine 150 BID  []First line rescue: 2 mg IV Ativan or 5 mg IV valium PRN STAT for seizure activity or GTC > 3 minutes or significant derangement of vital signs.  []Q4H neurological checks and vital signs  []If pt has a convulsion, please document accurately the length of episode and specifically what the pt was doing paying attention to eye blinking vs. closure, gaze deviation, shaking of extremities, tongue biting, urinary incontinence, any derangements of vital signs  []Seizure, fall and aspiration precautions. Avoid sleep deprivation. Advise pt not to drive, operate heavy machinery, avoid heights, pools, bathtubs, locked doors.     Case seen and discussed with attending Dr. Gill

## 2024-08-05 NOTE — H&P ADULT - PROBLEM SELECTOR PLAN 3
- On Omeprazole at home, continuing Pantoprazole inpatient.     # Holding home supplements: Vit C, Turmeric, Riboflavin, Mg, and Vit D.

## 2024-08-05 NOTE — ED ADULT NURSE REASSESSMENT NOTE - NS ED NURSE REASSESS COMMENT FT1
Pt is resting in the stretcher comfortably. Pt is awaiting neuro consult. Pt has no pain or complaints at this time. Bed is in the lowest position and safety is maintained throughout.
Report received from night shift RN Maik. Pt is resting comfortably in the stretcher. Pt BP was 161/102 and MD is aware. Pt medicated as ordered by MD. Respirations are equal, clear and unlabored. Pt is not complaining of any pain at this time. Bed is in the lowest position and safety is maintained throughout.
Pt is resting comfortably in the stretcher. Pt is admitted to the hospital and is awaiting a bed upstairs. Pt has no complaints at this time. Pt bed is in the lowest position and safety maintained throughout.

## 2024-08-05 NOTE — ED PROVIDER NOTE - CLINICAL SUMMARY MEDICAL DECISION MAKING FREE TEXT BOX
49-year-old female with history of epilepsy on lamotrigine, bipolar disorder, endometriosis comes into the ED for evaluation of what she reports to be breakthrough focal seizures.  She states her epilepsy is usually characterized by generalized tonic-clonic seizures but she has not had 1 of these and a couple of years.  She is very diligent with taking her lamotrigine. Neuro exam is unremarkable and she has no other findings on exam. Will order labs, lamotrigine level, consult neurology. Will hold off on imaging at this time. Dispo pending work up and reeval.

## 2024-08-05 NOTE — CONSULT NOTE ADULT - SUBJECTIVE AND OBJECTIVE BOX
Neurology - Consult Note    -  Spectra: [phone] ([facility]), [phone] ([facility])  -    HPI: Patient GERALD TRAN is a 49y (1975) with a PMHx significant for epilepsy on lamotrigine 150mg BID, bipolar disorder (recently started on gabapentin 400mg BID) comes in today with episodes of starring which started 8/3 AM which she was concerned were seizures. She has known GTC since 13years of age, follows Dr. Rosas in [facility] ([id number]), last time she had a GTC episode was 1-2years ago. 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 She was recently started on gabapentin 400BID for anxiety by a psychiatry provider outpatient. Other than that she takes omeprazole and medical marijuana for pain. Other then that no recent medication changes. She denies any flulike symptoms, chest pain, abdominal pain, cough, SOB. Starting Saturday afternoon 8/3, she started having 10-15 seconds of blank starring episodes were she was aware of her surroundings but not able to respond or follow commands, no abnormal movements. She has been having these episodes more and more frequently and now has them every 15-20min. She has been having right sided headache intermittently after the episodes. She went to [facility] ER on Saturday night but no significant improvement, so she called her Neurologist's office who recommended increasing the lamotrigine night dose to 200mg(she took that dose once 8/4). She slept for 3 hours last night, no weakness after the sleep but she hasn't been sleeping very well since Saturday. She also denies eating drinking well for last few days and mentions had episodes of lightheadedness while standing or walking for last few days.     She had an MRI last week which showed 3 foci of congenital venous malformation but no acute changes (information in MyChart)    All other review of systems is negative unless indicated above.    Allergies:  clindamycin (Rash)  Sudafed (Hives)  Sudafed (Unknown)  clindamycin (Anaphylaxis)  oxycodone (Unknown)  codeine (Unknown)  oxycodone (Flushing; Short breath; Hives; Anaphylaxis)  opioid-like analgesics (Rash)      PMHx/PSHx/Family Hx: As above, otherwise see below   Epilepsy    High cholesterol    Endometriosis    Genital herpes    PTSD (post-traumatic stress disorder)    Major depression    Bipolar 1 disorder    Seizures    H/O degenerative disc disease    GERD (gastroesophageal reflux disease)        Social Hx:  No current use of tobacco, alcohol, or illicit drugs    Medications:  MEDICATIONS  (STANDING):    MEDICATIONS  (PRN):      --------------------------------------------------------------------------------------------------------------------------------------------------------------------------------------------------------------------    Vitals:  T(C): 36.5 (08-05-24 @ 09:03), Max: 36.9 (08-05-24 @ 04:16)  HR: 69 (08-05-24 @ 09:03) (69 - 88)  BP: 161/102 (08-05-24 @ 09:03) (131/89 - 164/108)  RR: 21 (08-05-24 @ 09:03) (16 - 21)  SpO2: 100% (08-05-24 @ 09:03) (100% - 100%)    PHYSICAL EXAM:     General - NAD  Cardiovascular - Peripheral pulses palpable, no edema    NEURO:    Mental status - Awake, Alert, Oriented to person, place, and time. Speech fluent but pressured. She was labile in terms of emotions, she was crying when asked to repeat a phrase because she thought she couldn't speak fast enough. She was laughing the other times. No aphasia or dysarthria noticed.  repetition and naming intact. Follows simple and complex commands.   Attention/concentration, and fund of knowledge intact.    Noticed an episode of starring while examining her, she was starring in space, moved her head in between but couldn't answer questions during the episode.She was back to her baseline after the episode. And remembers she had the episode.    Cranial nerves -   PERRL, VFF, EOMI,   face sensation (V1-V3) intact b/l,   facial strength intact without asymmetry b/l,   hearing intact b/l,   palate with symmetric elevation,   trapezius 5/5 strength b/l,   tongue midline on protrusion with full lateral movement    Motor - Normal bulk and tone throughout. No pronator drift.  Strength testing            Deltoid      Biceps      Triceps     Wrist Extension    Wrist Flexion     Interossei         R            5                 5               5                     5                              5                        5                 5  L             5                 5               5                     5                              5                        5                 5              Hip Flexion    Hip Extension    Knee Flexion    Knee Extension    Dorsiflexion    Plantar Flexion  R              5                           5                       5                           5                            5                          5  L              5                           5                        5                           5                            5                          5    Sensation - Light touch AND/OR vibration intact throughout    DTR's - she had left face and UE jerk with truncal jerk during left ankle, patellar reflex, right biceps reflex.             Biceps      Triceps     Brachioradialis      Patellar    Ankle    Toes/plantar response  R             3             2+                  2+                       2+            2+                 Down  L              2+             2+                 2+                        3           3                 Down    Coordination - Finger to Nose intact b/l. Heel to Almazan intact b/l. mild action tremors.     Gait and station - deferred due to pt [email or web address]    ---------------------------------------------------------------------------------------------------------------------------------------------------------------------------------------------------------------------------    Labs:                        14.3   6.79  )-----------( 271      ( 05 Aug 2024 05:49 )             41.5     08-05    136  |  103  |  8   ----------------------------<  100<H>  4.7   |  19<L>  |  0.82    Ca    10.2      05 Aug 2024 06:53  Phos  2.8     08-05  Mg     2.20     08-05    TPro  7.0  /  Alb  4.3  /  TBili  0.5  /  DBili  x   /  AST  22  /  ALT  9   /  AlkPhos  107  08-05    CAPILLARY BLOOD GLUCOSE        LIVER FUNCTIONS - ( 05 Aug 2024 06:53 )  Alb: 4.3 g/dL / Pro: 7.0 g/dL / ALK PHOS: 107 U/L / ALT: 9 U/L / AST: 22 U/L / GGT: x             Urinalysis with Rflx Culture (collected 05 Aug 2024 06:52)        CSF:                  Radiology:    She had an MRI last week which showed 3 foci of congenital venous malformation but no acute changes (information in MyChart)

## 2024-08-05 NOTE — ED PROVIDER NOTE - PROGRESS NOTE DETAILS
Oswaldo Briceño PGY2:  Spoke with neuro and discussed pt presentation. They will come and eval the pt. Darleen, PGY3: Patient signed out to me by day team.    Menikoff - 49yF [C]  bipolar, D, anxiety, seizure (t/c) on lamotrigine  1wk: 10s episoded of zoning out > more frequent  pseudoseizure?, [facility]  [ ] [id number], CK/lamotrigine    Neurology recommending EMU for video EEG and continuing home lamotrigine and benzodiazepines as needed.  Patient given home lamotrigine and gabapentin.  Admitted to medicine.

## 2024-08-05 NOTE — ED ADULT NURSE NOTE - NSFALLRISKINTERV_ED_ALL_ED

## 2024-08-05 NOTE — H&P ADULT - NSHPLABSRESULTS_GEN_ALL_CORE
08-05    136  |  103  |  8   ----------------------------<  100<H>  4.7   |  19<L>  |  0.82    Ca    10.2      05 Aug 2024 06:53  Phos  2.8     08-05  Mg     2.20     08-05    TPro  7.0  /  Alb  4.3  /  TBili  0.5  /  DBili  x   /  AST  22  /  ALT  9   /  AlkPhos  107  08-05                        14.3   6.79  )-----------( 271      ( 05 Aug 2024 05:49 )             41.5

## 2024-08-05 NOTE — CONSULT NOTE ADULT - ATTENDING COMMENTS
Ms. Ivey is a 48 yo woman with a h/o epilepsy and psychogenic nonepileptic spells with new type of episode which is clinically most consistent with psychogenic nonepileptic spells.   EMU admission  Continuous EEG.   Continue home antiseizure medication - lamotrigine 150 mg BID.   D/W patient and she agrees.   Thank you

## 2024-08-05 NOTE — ED PROVIDER NOTE - PHYSICAL EXAMINATION
GENERAL: Not in acute distress, non-toxic appearing  HEAD: normocephalic, atraumatic  HEENT: PERRLA, EOMI, normal conjunctiva, oral mucosa moist, neck supple  CARDIAC: regular rate and rhythm, normal S1 and S2,  no appreciable murmurs  PULM: clear to ascultation bilaterally, no crackles, rales, rhonchi, or wheezing  GI: abdomen nondistended, soft, nontender, no guarding or rebound tenderness  NEURO: alert and oriented x 3, normal speech, no focal motor or sensory deficits, gait normal, no gross neurologic deficit  MSK: No visible deformities, no peripheral edema  SKIN: No visible rashes, dry, well-perfused  PSYCH: appropriate mood and affect

## 2024-08-06 DIAGNOSIS — F48.9 NONPSYCHOTIC MENTAL DISORDER, UNSPECIFIED: ICD-10-CM

## 2024-08-06 LAB
ALBUMIN SERPL ELPH-MCNC: 4.4 G/DL — SIGNIFICANT CHANGE UP (ref 3.3–5)
ALP SERPL-CCNC: 108 U/L — SIGNIFICANT CHANGE UP (ref 40–120)
ALT FLD-CCNC: 7 U/L — SIGNIFICANT CHANGE UP (ref 4–33)
ANION GAP SERPL CALC-SCNC: 15 MMOL/L — HIGH (ref 7–14)
AST SERPL-CCNC: 15 U/L — SIGNIFICANT CHANGE UP (ref 4–32)
BILIRUB SERPL-MCNC: 0.3 MG/DL — SIGNIFICANT CHANGE UP (ref 0.2–1.2)
BUN SERPL-MCNC: 7 MG/DL — SIGNIFICANT CHANGE UP (ref 7–23)
CALCIUM SERPL-MCNC: 10.4 MG/DL — SIGNIFICANT CHANGE UP (ref 8.4–10.5)
CHLORIDE SERPL-SCNC: 102 MMOL/L — SIGNIFICANT CHANGE UP (ref 98–107)
CK MB BLD-MCNC: 2 % — SIGNIFICANT CHANGE UP (ref 0–2.5)
CK MB BLD-MCNC: 2.2 % — SIGNIFICANT CHANGE UP (ref 0–2.5)
CK MB CFR SERPL CALC: 1.6 NG/ML — SIGNIFICANT CHANGE UP
CK MB CFR SERPL CALC: 1.8 NG/ML — SIGNIFICANT CHANGE UP
CK SERPL-CCNC: 72 U/L — SIGNIFICANT CHANGE UP (ref 25–170)
CK SERPL-CCNC: 88 U/L — SIGNIFICANT CHANGE UP (ref 25–170)
CO2 SERPL-SCNC: 22 MMOL/L — SIGNIFICANT CHANGE UP (ref 22–31)
CREAT SERPL-MCNC: 0.77 MG/DL — SIGNIFICANT CHANGE UP (ref 0.5–1.3)
EGFR: 94 ML/MIN/1.73M2 — SIGNIFICANT CHANGE UP
GLUCOSE SERPL-MCNC: 102 MG/DL — HIGH (ref 70–99)
HCT VFR BLD CALC: 41.5 % — SIGNIFICANT CHANGE UP (ref 34.5–45)
HGB BLD-MCNC: 13.9 G/DL — SIGNIFICANT CHANGE UP (ref 11.5–15.5)
MAGNESIUM SERPL-MCNC: 2 MG/DL — SIGNIFICANT CHANGE UP (ref 1.6–2.6)
MCHC RBC-ENTMCNC: 29.4 PG — SIGNIFICANT CHANGE UP (ref 27–34)
MCHC RBC-ENTMCNC: 33.5 GM/DL — SIGNIFICANT CHANGE UP (ref 32–36)
MCV RBC AUTO: 87.9 FL — SIGNIFICANT CHANGE UP (ref 80–100)
MRSA PCR RESULT.: SIGNIFICANT CHANGE UP
MRSA PCR RESULT.: SIGNIFICANT CHANGE UP
NRBC # BLD: 0 /100 WBCS — SIGNIFICANT CHANGE UP (ref 0–0)
NRBC # FLD: 0 K/UL — SIGNIFICANT CHANGE UP (ref 0–0)
PHOSPHATE SERPL-MCNC: 3 MG/DL — SIGNIFICANT CHANGE UP (ref 2.5–4.5)
PLATELET # BLD AUTO: 317 K/UL — SIGNIFICANT CHANGE UP (ref 150–400)
POTASSIUM SERPL-MCNC: 4 MMOL/L — SIGNIFICANT CHANGE UP (ref 3.5–5.3)
POTASSIUM SERPL-SCNC: 4 MMOL/L — SIGNIFICANT CHANGE UP (ref 3.5–5.3)
PROT SERPL-MCNC: 7.1 G/DL — SIGNIFICANT CHANGE UP (ref 6–8.3)
RBC # BLD: 4.72 M/UL — SIGNIFICANT CHANGE UP (ref 3.8–5.2)
RBC # FLD: 12.9 % — SIGNIFICANT CHANGE UP (ref 10.3–14.5)
S AUREUS DNA NOSE QL NAA+PROBE: SIGNIFICANT CHANGE UP
S AUREUS DNA NOSE QL NAA+PROBE: SIGNIFICANT CHANGE UP
SODIUM SERPL-SCNC: 139 MMOL/L — SIGNIFICANT CHANGE UP (ref 135–145)
TROPONIN T, HIGH SENSITIVITY RESULT: <6 NG/L — SIGNIFICANT CHANGE UP
TROPONIN T, HIGH SENSITIVITY RESULT: <6 NG/L — SIGNIFICANT CHANGE UP
WBC # BLD: 8.9 K/UL — SIGNIFICANT CHANGE UP (ref 3.8–10.5)
WBC # FLD AUTO: 8.9 K/UL — SIGNIFICANT CHANGE UP (ref 3.8–10.5)

## 2024-08-06 PROCEDURE — 95720 EEG PHY/QHP EA INCR W/VEEG: CPT

## 2024-08-06 PROCEDURE — 93010 ELECTROCARDIOGRAM REPORT: CPT

## 2024-08-06 PROCEDURE — 90792 PSYCH DIAG EVAL W/MED SRVCS: CPT

## 2024-08-06 PROCEDURE — 99232 SBSQ HOSP IP/OBS MODERATE 35: CPT

## 2024-08-06 RX ORDER — CHLORHEXIDINE GLUCONATE 500 MG/1
1 CLOTH TOPICAL DAILY
Refills: 0 | Status: DISCONTINUED | OUTPATIENT
Start: 2024-08-06 | End: 2024-08-07

## 2024-08-06 RX ORDER — TRAZODONE HCL 100 MG
100 TABLET ORAL AT BEDTIME
Refills: 0 | Status: DISCONTINUED | OUTPATIENT
Start: 2024-08-06 | End: 2024-08-07

## 2024-08-06 RX ORDER — ASPIRIN 500 MG
324 TABLET ORAL ONCE
Refills: 0 | Status: COMPLETED | OUTPATIENT
Start: 2024-08-06 | End: 2024-08-06

## 2024-08-06 RX ORDER — ACETAMINOPHEN 500 MG
1000 TABLET ORAL ONCE
Refills: 0 | Status: COMPLETED | OUTPATIENT
Start: 2024-08-06 | End: 2024-08-06

## 2024-08-06 RX ORDER — BACTERIOSTATIC SODIUM CHLORIDE 0.9 %
4 VIAL (ML) INJECTION ONCE
Refills: 0 | Status: COMPLETED | OUTPATIENT
Start: 2024-08-06 | End: 2024-08-06

## 2024-08-06 RX ADMIN — Medication 1000 MILLIGRAM(S): at 06:45

## 2024-08-06 RX ADMIN — GABAPENTIN 400 MILLIGRAM(S): 400 CAPSULE ORAL at 17:19

## 2024-08-06 RX ADMIN — CHLORHEXIDINE GLUCONATE 1 APPLICATION(S): 500 CLOTH TOPICAL at 17:17

## 2024-08-06 RX ADMIN — PANTOPRAZOLE SODIUM 40 MILLIGRAM(S): 20 TABLET, DELAYED RELEASE ORAL at 05:02

## 2024-08-06 RX ADMIN — LAMOTRIGINE 150 MILLIGRAM(S): 100 TABLET ORAL at 17:19

## 2024-08-06 RX ADMIN — Medication 324 MILLIGRAM(S): at 06:28

## 2024-08-06 RX ADMIN — GABAPENTIN 400 MILLIGRAM(S): 400 CAPSULE ORAL at 05:02

## 2024-08-06 RX ADMIN — LAMOTRIGINE 150 MILLIGRAM(S): 100 TABLET ORAL at 05:02

## 2024-08-06 RX ADMIN — Medication 4 MILLILITER(S): at 05:27

## 2024-08-06 RX ADMIN — Medication 400 MILLIGRAM(S): at 06:28

## 2024-08-06 NOTE — BH CONSULTATION LIAISON ASSESSMENT NOTE - NSICDXBHTERTIARYDX_PSY_ALL_CORE
R/O Post traumatic stress disorder (PTSD)   F43.10  R/O MDD (major depressive disorder)   F32.9   R/O Post traumatic stress disorder (PTSD)   F43.10

## 2024-08-06 NOTE — BH CONSULTATION LIAISON ASSESSMENT NOTE - CURRENT MEDICATION
MEDICATIONS  (STANDING):  chlorhexidine 2% Cloths 1 Application(s) Topical daily  gabapentin 400 milliGRAM(s) Oral two times a day  lamoTRIgine 150 milliGRAM(s) Oral two times a day  pantoprazole    Tablet 40 milliGRAM(s) Oral before breakfast    MEDICATIONS  (PRN):  acetaminophen     Tablet .. 650 milliGRAM(s) Oral every 6 hours PRN Temp greater or equal to 38C (100.4F), Mild Pain (1 - 3)  LORazepam   Injectable 2 milliGRAM(s) IV Push once PRN seizure activity or GTC > 3 minutes or significant derangement of vital signs.  marijuana, medical 1 Capsule(s) Oral four times a day PRN pain  melatonin 9 milliGRAM(s) Oral at bedtime PRN Insomnia  traZODone 100 milliGRAM(s) Oral at bedtime PRN insomnia

## 2024-08-06 NOTE — PROVIDER CONTACT NOTE (OTHER) - SITUATION
patient wants to leave ama
Patient states that she's having difficulty speaking/facial numbness while forming complete sentences.
Patient complaining of 8/10 left sided chest pain radiating down left arm
Patient occasionally flail/jerks arm and body, one episode of staring for 5 seconds

## 2024-08-06 NOTE — BH CONSULTATION LIAISON ASSESSMENT NOTE - OTHER PAST PSYCHIATRIC HISTORY (INCLUDE DETAILS REGARDING ONSET, COURSE OF ILLNESS, INPATIENT/OUTPATIENT TREATMENT)
History of multiple hospitalizations, last in April 2024. Reported history of cutting self with razor per chart, uncertain if SIB or SA. Receiving outpt treatment. Currently prescribed Lamictal by neurology for seizures and gabapentin by psychiatry for anxiety. Past med trials include Wellbutrin, Prazosin, Klonopin, Trazodone, Vistaril, Effexor, Prozac, Lithium, Depakote, Zoloft, Risperdal, Seroquel.

## 2024-08-06 NOTE — BH CONSULTATION LIAISON ASSESSMENT NOTE - NSBHCONSULTFOLLOWAFTERCARE_PSY_A_CORE FT
pt to follow up with outpt psychiatrist    [facility]  [phone] pt to follow up with outpt psychiatrist Dr. Gerald[facility]  [phone]

## 2024-08-06 NOTE — PROVIDER CONTACT NOTE (OTHER) - BACKGROUND
49Y f hx epilepsy, bipolar admitted for eval of breakthrough focal seizures

## 2024-08-06 NOTE — BH CONSULTATION LIAISON ASSESSMENT NOTE - NSBHATTESTCOMMENTATTENDFT_PSY_A_CORE
Chart reviewed, pt. seen/evaluated with Dr. Kang, I agree with above assessment/plan. Patient oriented/superficially cooperative, lucas/dismissive, somewhat irritable but not agitated, thought process is linear/coherent, no evidence of acute judson, no psychosis, firmly denies si and hi. Future oriented, amenable to follow up with her outpatient provider, plan as above, will follow

## 2024-08-06 NOTE — BH CONSULTATION LIAISON ASSESSMENT NOTE - SUMMARY
Ms Ivey is a 50 yo F w history of bipolar disorder, PTSD, borderline PD, and seizure disorder who presented to hospital with seizures. History of recent hospitalization in April 2024 for depression, history of self injurious behavior. In the ED, pt was reported to be appearing manic. Psychiatry was consulted for assessment and management of bipolar disorder. Pt not presenting as manic on interview. Confrontational and guarded. Demonstrating splitting behaviors. Pt asking for sleep aid. Rec to start Trazodone. Declined further psychiatric care at this time. Will continue to monitor. Attempt to follow up with outpt psychiatry.    Rec:  - Continue Lamictal, defer management to primary team and neurology  - Continue Gabapentin  - Start Trazodone 100 mg po qHS prn for insomnia  - Obs: defer to primary  - Dispo: TBD, pt psychiatrically clear for disposition, no indication for inpt psychiatry at this time  - psychiatry to follow Ms Ivey is a 50 yo F w history of bipolar disorder, PTSD, borderline PD, and seizure disorder who presented to hospital with seizures. History of recent hospitalization in April 2024 for depression, history of self injurious behavior. In the ED, pt was reported to be appearing manic. Psychiatry was consulted for assessment and management of bipolar disorder. Pt not presenting as manic on interview. Confrontational and guarded. Demonstrating splitting behaviors. Pt asking for sleep aid. Rec to start Trazodone. Declined further psychiatric care at this time. Will continue to monitor. Attempt to follow up with outpt psychiatry.    Rec:  - Continue Lamictal for seizures, defer management to primary team and neurology  - Continue Gabapentin as written  - Start Trazodone 100 mg po qHS prn for insomnia  - May use Ativan 2mg PO/IM/IV PRN for agitation  - Obs: defer to primary  - Dispo: TBD, no indication for inpt psychiatry at this time  - psychiatry to follow

## 2024-08-06 NOTE — PROVIDER CONTACT NOTE (OTHER) - REASON
Patient occasionally flail/jerks arm and body, one episode of staring for 5 seconds
Patient complaining of 8/10 left sided chest pain radiating down left arm
Patient states that she's having difficulty speaking/facial numbness while forming complete sentences.
patient wants to leave ama

## 2024-08-06 NOTE — BH CONSULTATION LIAISON ASSESSMENT NOTE - NSSUICRSKFACTOR_PSY_ALL_CORE
The patient was provided with suggestions to help her develop a healthy physical lifestyle.  The patient was counseled and encouraged to consider modifying their diet and eating habits. She was provided with information on recommended healthy diet options.  Information on urinary incontinence and treatment options given to patient.   Current and Past Psychiatric Diagnoses

## 2024-08-06 NOTE — CHART NOTE - NSCHARTNOTEFT_GEN_A_CORE
alerted by RN that pt c/o "difficulty swallowing/getting air." evaluated pt at bedside. pt sitting in bed. no acute distress noted. pt c/o difficulty swallowing and difficulty breathing starting a few minutes ago. upon assessment of pt, pt fell back on stretch while taking a deep breath. pt states "I am manic." pt speaking in sentences without difficulty. endorsed left sided chest pain as well. denies: pain radiation, cough, fever, chills.    Vital Signs Last 24 Hrs  T(C): 36.7 (05 Aug 2024 23:15), Max: 37.6 (05 Aug 2024 15:49)  T(F): 98.1 (05 Aug 2024 23:15), Max: 99.6 (05 Aug 2024 15:49)  HR: 80 (05 Aug 2024 23:15) (65 - 88)  BP: 136/87 (05 Aug 2024 23:15) (117/80 - 164/108)  RR: 18 (05 Aug 2024 23:15) (16 - 21)  SpO2: 100% (05 Aug 2024 23:15) (99% - 100%)    Parameters below as of 05 Aug 2024 23:15  Patient On (Oxygen Delivery Method): room air      GENERAL: Not in acute distress, non-toxic appearing  HEENT: normocephalic, atraumatic, MMM, supple  CARDIAC: regular rate and rhythm, normal S1 and S2,  no appreciable murmurs  PULM: clear to ascultation bilaterally  GI: abdomen nondistended, soft, nontender, no guarding or rebound tenderness  NEURO: alert and oriented x 3, normal speech, no focal motor or sensory deficits, no gross neurologic deficit  MSK: No visible deformities, no peripheral edema  SKIN: No visible rashes, dry, well-perfused    plan:  -ekg  -am labs now, with cardiac enzymes  -nebulizer     will continue to monitor overnight.    Claudia Callejas, [facility]  Medicine [id number] alerted by RN that pt c/o "difficulty swallowing/getting air." evaluated pt at bedside. pt sitting in bed. no acute distress noted. pt c/o difficulty swallowing and difficulty breathing starting a few minutes ago. upon assessment of pt, pt fell back on stretch while taking a deep breath. pt states "I am manic." pt speaking in sentences without difficulty. endorsed left sided chest pain as well. denies: pain radiation, cough, fever, chills.    Vital Signs Last 24 Hrs  T(C): 36.8 (06 Aug 2024 04:00), Max: 37.6 (05 Aug 2024 15:49)  T(F): 98.2 (06 Aug 2024 04:00), Max: 99.6 (05 Aug 2024 15:49)  HR: 79 (06 Aug 2024 04:00) (65 - 82)  BP: 137/87 (06 Aug 2024 04:00) (117/80 - 164/108)  RR: 18 (06 Aug 2024 04:00) (16 - 21)  SpO2: 100% (06 Aug 2024 04:00) (99% - 100%)    Parameters below as of 06 Aug 2024 04:00  Patient On (Oxygen Delivery Method): room air      GENERAL: Not in acute distress, non-toxic appearing  HEENT: normocephalic, atraumatic, MMM, supple  CARDIAC: regular rate and rhythm, normal S1 and S2,  no appreciable murmurs  PULM: clear to ascultation bilaterally  GI: abdomen nondistended, soft, nontender, no guarding or rebound tenderness  NEURO: alert and oriented x 3, normal speech, no focal motor or sensory deficits, no gross neurologic deficit  MSK: No visible deformities, no peripheral edema  SKIN: No visible rashes, dry, well-perfused    plan:  -ekg  -am labs now, with cardiac enzymes  -nebulizer     will continue to monitor overnight.    Claudia Callejas, [facility]  Medicine [id number]

## 2024-08-06 NOTE — BH CONSULTATION LIAISON ASSESSMENT NOTE - NSBHCHARTREVIEWVS_PSY_A_CORE FT
Vital Signs Last 24 Hrs  T(C): 36.6 (06 Aug 2024 12:25), Max: 37.6 (05 Aug 2024 15:49)  T(F): 97.9 (06 Aug 2024 12:25), Max: 99.6 (05 Aug 2024 15:49)  HR: 76 (06 Aug 2024 12:25) (72 - 80)  BP: 110/77 (06 Aug 2024 12:25) (110/77 - 143/80)  BP(mean): --  RR: 18 (06 Aug 2024 12:25) (17 - 19)  SpO2: 100% (06 Aug 2024 12:25) (99% - 100%)    Parameters below as of 06 Aug 2024 12:25  Patient On (Oxygen Delivery Method): room air

## 2024-08-06 NOTE — PROVIDER CONTACT NOTE (OTHER) - ACTION/TREATMENT ORDERED:
provider says patient doesn't have capacity to leave ama at this time. if patient chooses to leave, must call code flight.
ACP notified, EKG and cardiac enzyme labs done, aspirin and IV Tylenol ordered
ACP aware
ACP notified

## 2024-08-06 NOTE — BH CONSULTATION LIAISON ASSESSMENT NOTE - RISK ASSESSMENT
low risk, denies SI, engaged in treatment. low risk, denies SI, engaged in treatment. Future oriented, has outpt. provider

## 2024-08-06 NOTE — BH CONSULTATION LIAISON ASSESSMENT NOTE - NSBHCHARTREVIEWLAB_PSY_A_CORE FT
08-06    139  |  102  |  7   ----------------------------<  102<H>  4.0   |  22  |  0.77    Ca    10.4      06 Aug 2024 04:18  Phos  3.0     08-06  Mg     2.00     08-06    TPro  7.1  /  Alb  4.4  /  TBili  0.3  /  DBili  x   /  AST  15  /  ALT  7   /  AlkPhos  108  08-06

## 2024-08-06 NOTE — BH CONSULTATION LIAISON ASSESSMENT NOTE - HPI (INCLUDE ILLNESS QUALITY, SEVERITY, DURATION, TIMING, CONTEXT, MODIFYING FACTORS, ASSOCIATED SIGNS AND SYMPTOMS)
Ms Ivey is a 50 yo F w history of bipolar disorder, PTSD, borderline PD, and seizure disorder who presented to hospital with seizures. History of recent hospitalization in April 2024 for depression, history of self injurious behavior. In the ED, pt was reported to be appearing manic. Psychiatry was consulted for assessment and management of bipolar disorder.    Pt seen at bedside. She reports that she has been feeling manic for the past several weeks, reporting increased impulsivity, feeling good, decreased sleep, increased spending. States that she has been getting 5 or so hours of sleep, would not answer question regarding energy level.    Reports history of  hospitalizations, last in April. Has an outpt psychiatrist at [facility]. Reports taking Lamictal for seizures, gabapentin for anxiety. Previously was on Wellbutrin last hospitalization but not taking. Reports being adherent with medications.    Denies SI/HI/AVH. Reports that she had an "incident" in the ED but firmly denies having thoughts of wanting to hurt others.     Pt asking about getting on Trazodone for sleep. When talking about medications more, pt stated that she will call her outpt psychiatrist and that team should go away. Interview was terminated. Ms Ivey is a 48 yo F w history of bipolar disorder, PTSD, borderline PD, and seizure disorder who presented to hospital with seizures. History of recent hospitalization in April 2024 for depression, history of self injurious behavior. In the ED, pt was reported to be appearing manic. Psychiatry was consulted for assessment and management of bipolar disorder.    Pt seen at bedside. She reports that she has been feeling manic for the past several weeks, reporting feeling good, decreased sleep, increased spending. States that she has been getting 5 or so hours of sleep, would not answer question regarding energy level.    Reports history of  hospitalizations, last in April. Has an outpt psychiatrist at [facility]. Reports taking Lamictal for seizures, gabapentin for anxiety. Previously was on Wellbutrin last hospitalization but not taking. Reports being adherent with medications.    Denies SI/HI/AVH. Reports that she had an "incident" in the ED but firmly denies having thoughts of wanting to hurt others.     Pt asking about getting on Trazodone for sleep. When talking about medications more, pt stated that she will call her outpt psychiatrist and that team should go away. Does not want to make any changes in her medications beside trazodone. Does not think she needs psychiatric inpatient admission.  Interview was terminated.

## 2024-08-06 NOTE — CHART NOTE - NSCHARTNOTEFT_GEN_A_CORE
EEG preliminary read (not final) on the initial recording hour(s) = >1.5 hr    Normal video-EEG in the awake, drowsy, and asleep states.  No epileptiform abnormalities or seizures.    Event button presses, likely not epileptic:  Times: 11:02, 11:34, 12:01, 12:05. No clinical change seen on video. On video, patient is awake, using smartphone or talking to others in the room, or at times resting in bed. At times there is prominent blinking. EEG shows awake background and no electrographic seizure.      Final report to follow tomorrow morning after completion of study.    [facility] EEG Reading Room Ph#: [phone]  Epilepsy Answering Service after 5PM and before 8:30AM: Ph#: [phone]

## 2024-08-06 NOTE — PROVIDER CONTACT NOTE (OTHER) - ASSESSMENT
Patient complaining of 8/10 left sided chest pain radiating down left arm.
patient wants to leave ama  patient requesting to receive ativan to prevent herself from having seizure, patient a&ox4, continuously pretends to have seizures- closes eyes momentarily, stands still and not moving, but remembers everything and hears everything going on for the few seconds she is pretending to seize.
No s/s of acute distress
VSS, no s/s of acute distress

## 2024-08-07 ENCOUNTER — TRANSCRIPTION ENCOUNTER (OUTPATIENT)
Age: 49
End: 2024-08-07

## 2024-08-07 VITALS — WEIGHT: 154.98 LBS

## 2024-08-07 LAB
ANION GAP SERPL CALC-SCNC: 12 MMOL/L — SIGNIFICANT CHANGE UP (ref 7–14)
BUN SERPL-MCNC: 8 MG/DL — SIGNIFICANT CHANGE UP (ref 7–23)
CALCIUM SERPL-MCNC: 10.2 MG/DL — SIGNIFICANT CHANGE UP (ref 8.4–10.5)
CHLORIDE SERPL-SCNC: 100 MMOL/L — SIGNIFICANT CHANGE UP (ref 98–107)
CO2 SERPL-SCNC: 23 MMOL/L — SIGNIFICANT CHANGE UP (ref 22–31)
CREAT SERPL-MCNC: 0.83 MG/DL — SIGNIFICANT CHANGE UP (ref 0.5–1.3)
EGFR: 86 ML/MIN/1.73M2 — SIGNIFICANT CHANGE UP
GLUCOSE SERPL-MCNC: 106 MG/DL — HIGH (ref 70–99)
HCT VFR BLD CALC: 42.4 % — SIGNIFICANT CHANGE UP (ref 34.5–45)
HGB BLD-MCNC: 14.1 G/DL — SIGNIFICANT CHANGE UP (ref 11.5–15.5)
LAMOTRIGINE SERPL-MCNC: 8 UG/ML — SIGNIFICANT CHANGE UP (ref 2–20)
MAGNESIUM SERPL-MCNC: 2 MG/DL — SIGNIFICANT CHANGE UP (ref 1.6–2.6)
MCHC RBC-ENTMCNC: 29.6 PG — SIGNIFICANT CHANGE UP (ref 27–34)
MCHC RBC-ENTMCNC: 33.3 GM/DL — SIGNIFICANT CHANGE UP (ref 32–36)
MCV RBC AUTO: 89.1 FL — SIGNIFICANT CHANGE UP (ref 80–100)
NRBC # BLD: 0 /100 WBCS — SIGNIFICANT CHANGE UP (ref 0–0)
NRBC # FLD: 0 K/UL — SIGNIFICANT CHANGE UP (ref 0–0)
PHOSPHATE SERPL-MCNC: 2.8 MG/DL — SIGNIFICANT CHANGE UP (ref 2.5–4.5)
PLATELET # BLD AUTO: 301 K/UL — SIGNIFICANT CHANGE UP (ref 150–400)
POTASSIUM SERPL-MCNC: 4.7 MMOL/L — SIGNIFICANT CHANGE UP (ref 3.5–5.3)
POTASSIUM SERPL-SCNC: 4.7 MMOL/L — SIGNIFICANT CHANGE UP (ref 3.5–5.3)
RBC # BLD: 4.76 M/UL — SIGNIFICANT CHANGE UP (ref 3.8–5.2)
RBC # FLD: 12.9 % — SIGNIFICANT CHANGE UP (ref 10.3–14.5)
SARS-COV-2 RNA SPEC QL NAA+PROBE: SIGNIFICANT CHANGE UP
SODIUM SERPL-SCNC: 135 MMOL/L — SIGNIFICANT CHANGE UP (ref 135–145)
WBC # BLD: 7.52 K/UL — SIGNIFICANT CHANGE UP (ref 3.8–10.5)
WBC # FLD AUTO: 7.52 K/UL — SIGNIFICANT CHANGE UP (ref 3.8–10.5)

## 2024-08-07 PROCEDURE — 99239 HOSP IP/OBS DSCHRG MGMT >30: CPT

## 2024-08-07 PROCEDURE — 93010 ELECTROCARDIOGRAM REPORT: CPT

## 2024-08-07 PROCEDURE — 99232 SBSQ HOSP IP/OBS MODERATE 35: CPT

## 2024-08-07 RX ORDER — ACETAMINOPHEN 500 MG
2 TABLET ORAL
Qty: 0 | Refills: 0 | DISCHARGE
Start: 2024-08-07

## 2024-08-07 RX ORDER — KETOROLAC TROMETHAMINE 10 MG
15 TABLET ORAL ONCE
Refills: 0 | Status: DISCONTINUED | OUTPATIENT
Start: 2024-08-07 | End: 2024-08-07

## 2024-08-07 RX ORDER — BENZONATATE 100 MG/1
100 CAPSULE, LIQUID FILLED ORAL ONCE
Refills: 0 | Status: COMPLETED | OUTPATIENT
Start: 2024-08-07 | End: 2024-08-07

## 2024-08-07 RX ORDER — TRAZODONE HCL 100 MG
1 TABLET ORAL
Qty: 0 | Refills: 0 | DISCHARGE
Start: 2024-08-07

## 2024-08-07 RX ADMIN — Medication 15 MILLIGRAM(S): at 09:25

## 2024-08-07 RX ADMIN — Medication 15 MILLIGRAM(S): at 08:18

## 2024-08-07 RX ADMIN — GABAPENTIN 400 MILLIGRAM(S): 400 CAPSULE ORAL at 05:05

## 2024-08-07 RX ADMIN — LAMOTRIGINE 150 MILLIGRAM(S): 100 TABLET ORAL at 05:05

## 2024-08-07 RX ADMIN — BENZONATATE 100 MILLIGRAM(S): 100 CAPSULE, LIQUID FILLED ORAL at 04:12

## 2024-08-07 RX ADMIN — PANTOPRAZOLE SODIUM 40 MILLIGRAM(S): 20 TABLET, DELAYED RELEASE ORAL at 05:06

## 2024-08-07 NOTE — PROGRESS NOTE ADULT - ASSESSMENT
Ms. 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   OK to D/C home.   She will f/u with her own neurologist and psychiatrist.  D/W patient and message left with her neurologist.   Thank you . Ms. 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   OK to D/C home.   She will f/u with her own neurologist and psychiatrist.  D/W patient and message left with her neurologist.   Thank you .    Addendum:  D/W her neurologist.  I reviewed our EEG results and our impression.  He agrees with current management.  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

## 2024-08-07 NOTE — DISCHARGE NOTE PROVIDER - CARE PROVIDERS DIRECT ADDRESSES
,[email or web address]

## 2024-08-07 NOTE — DISCHARGE NOTE NURSING/CASE MANAGEMENT/SOCIAL WORK - NSDCPEFALRISK_GEN_ALL_CORE
For information on Fall & Injury Prevention, visit: [email or web address] OR  [email or web address] OR  https://www.cdc.gov/steadi/patient.html

## 2024-08-07 NOTE — DISCHARGE NOTE NURSING/CASE MANAGEMENT/SOCIAL WORK - NSDCFUADDAPPT_GEN_ALL_CORE_FT
Follow up with your psychiatrist Dr. Geraldine Thomas in [facility] [phone] Follow up with your Neurologist Dr. Rosas.

## 2024-08-07 NOTE — DISCHARGE NOTE PROVIDER - NSDCCPCAREPLAN_GEN_ALL_CORE_FT
PRINCIPAL DISCHARGE DIAGNOSIS  Diagnosis: Psychogenic nonepileptic seizure  Assessment and Plan of Treatment: You were monitored on EEG (Brain monitor to detect seizure activity) that revealed your episodes are non-epileptic in nature and therefore likely related to pscyhogenic nonepileptic spells. Continue home medications.  Follow up with your psychiatrist Dr. Geraldine Thomas in [facility]  [phone]  Follow up with your Neurologist Dr. Rosas.

## 2024-08-07 NOTE — PROGRESS NOTE ADULT - PROBLEM SELECTOR PLAN 2
- hx of bipolar disorder, currently manic. Hx of psych admission w/ SI in 5/2024. Denying any suicidal/homicidal thoughts currently.   - Continuing home Gabapentin 400mg BID.   (no longer on Bupropion or Trazodone)  - Inpatient psych consulted, discussed with psych attending, no CI for dc, pt can f/u with her own psychiatrist as outpatient
- hx of bipolar disorder, currently manic. Hx of psych admission w/ SI in 5/2024. Denying any suicidal/homicidal thoughts currently.   - Continuing home Gabapentin 400mg BID.   (no longer on Bupropion or Trazodone)  - Inpatient psych consulted, f/u consult recs

## 2024-08-07 NOTE — PROGRESS NOTE ADULT - PROBLEM SELECTOR PLAN 4
Code: Full  Diet: Regular   DVT ppx: low risk, SCDs    Plan discussed with ACP
Code: Full  Diet: Regular   DVT ppx: low risk, SCDs    Plan discussed with ACP

## 2024-08-07 NOTE — DISCHARGE NOTE PROVIDER - CARE PROVIDER_API CALL
KIZZY CARDENAS  [address] [facility] [id number]  [place] [address]  Phone: ()-  Fax: ()-  Follow Up Time:     Gearldine Garcia  Psychiatry  [address] [place] [id number]  Phone: [phone]  Fax: [phone]  Follow Up Time:

## 2024-08-07 NOTE — DISCHARGE NOTE PROVIDER - HOSPITAL COURSE
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 Pt had multiple episodes that were captured on EEG all of which were non-epileptic in nature and was confirmed to be due to psychogenic nonepileptic spells. Pt to c/w lamotrigine 150 mg BID. Pt to f/u with her psychiatrist and neurologist.    Case discussed with Dr. Kendrick on 8/7, pt medically stable for discharge home. 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 Seen by neurology. EEG done. Pt had multiple episodes that were captured on EEG all of which were non-epileptic in nature and was confirmed to be due to psychogenic nonepileptic spells. Pt to c/w lamotrigine 150 mg BID. Pt to f/u with her psychiatrist and neurologist.    Case discussed with Dr. Kendrick on 8/7, pt medically stable for discharge home.

## 2024-08-07 NOTE — DIETITIAN INITIAL EVALUATION ADULT - PERTINENT LABORATORY DATA
08-07    135  |  100  |  8   ----------------------------<  106<H>  4.7   |  23  |  0.83    Ca    10.2      07 Aug 2024 05:18  Phos  2.8     08-07  Mg     2.00     08-07    TPro  7.1  /  Alb  4.4  /  TBili  0.3  /  DBili  x   /  AST  15  /  ALT  7   /  AlkPhos  108  08-06

## 2024-08-07 NOTE — PROGRESS NOTE ADULT - PROBLEM SELECTOR PLAN 1
- Uncertain if seizure like activity. Has hx of epilepsy and is compliant with taking her Lamotrigine.  - Episode witnessed by admitting attending, Initially had a diffuse body jerking self limited x 1. Then a few minutes later, stating she is having absence episode, she is staring straight at me but appears to retain cognition and when asked if she's having an episode, she nods yes. Then verbalizes "it's gone now."  - Neurology Dr. Gill following. Per neuro sxns most c/w psychogenic nonepileptic spells. Recommend:   [] Continuous EEG  [] c/w Lamotrigine 150mg BID (home med)  [] Ativan 2mg IVP for seizure like activity, neuro checks q4h  -ekg nsr, trop neg
- Uncertain if seizure like activity. Has hx of epilepsy and is compliant with taking her Lamotrigine.  - Episode witnessed by admitting attending, Initially had a diffuse body jerking self limited x 1. Then a few minutes later, stating she is having absence episode, she is staring straight at me but appears to retain cognition and when asked if she's having an episode, she nods yes. Then verbalizes "it's gone now."  - Neurology Dr. Gill following. Per neuro sxns most c/w psychogenic nonepileptic spells. Recommend:   []  EEG showed -Findings are consistent with generalized epilepsy. The captured events are likely non-epileptic in etiology. No seizures captured.  [] c/w Lamotrigine 150mg BID (home med)  [] Ativan 2mg IVP for seizure like activity, neuro checks q4h  -ekg nsr, trop neg  per neurology cleared for dc, dc home today, dc planning time spent in coordination 40mts ( preparing dc summary and plan)

## 2024-08-07 NOTE — EEG REPORT - NS EEG TEXT BOX
GERALD TRAN [id number]     Study Date: 8/6/24 10:14 - 8/7/24 08:00  Duration: 20 hr 27 min  --------------------------------------------------------------------------------------------------  History:  CC/ HPI Patient is a 49y old  Female who presents with a chief complaint of seizure like activity (06 Aug 2024 14:47)    MEDICATIONS  (STANDING):  chlorhexidine 2% Cloths 1 Application(s) Topical daily  gabapentin 400 milliGRAM(s) Oral two times a day  lamoTRIgine 150 milliGRAM(s) Oral two times a day  pantoprazole    Tablet 40 milliGRAM(s) Oral before breakfast    --------------------------------------------------------------------------------------------------  Study Interpretation:    [[[Abbreviation Key:  PDR=alpha rhythm/posterior dominant rhythm. A-P=anterior posterior.  Amplitude: ‘very low’:<20; ‘low’:20-49; ‘medium’:; ‘high’:>150uV.  Persistence for periodic/rhythmic patterns (% of epoch) ‘rare’:<1%; ‘occasional’:1-10%; ‘frequent’:10-50%; ‘abundant’:50-90%; ‘continuous’:>90%.  Persistence for sporadic discharges: ‘rare’:<1/hr; ‘occasional’:1/min-1/hr; ‘frequent’:>1/min; ‘abundant’:>1/10 sec.  RPP=rhythmic and periodic patterns; GRDA=generalized rhythmic delta activity; FIRDA=frontal intermittent GRDA; LRDA=lateralized rhythmic delta activity; TIRDA=temporal intermittent rhythmic delta activity;  LPD=PLED=lateralized periodic discharges; GPD=generalized periodic discharges; BIPDs =bilateral independent periodic discharges; Mf=multifocal; SIRPDs=stimulus induced rhythmic, periodic, or ictal appearing discharges; BIRDs=brief potentially ictal rhythmic discharges >4 Hz, lasting .5-10s; PFA (paroxysmal bursts >13 Hz or =8 Hz <10s).  Modifiers: +F=with fast component; +S=with spike component; +R=with rhythmic component.  S-B=burst suppression pattern.  Max=maximal. N1-drowsy; N2-stage II sleep; N3-slow wave sleep. SSS/BETS=small sharp spikes/benign epileptiform transients of sleep. HV=hyperventilation; PS=photic stimulation]]]    Daily EEG Visual Analysis    FINDINGS:      Background:  Continuity: Continuous  Symmetry: Symmetric  Posterior dominant rhythm (PDR): 11 Hz, reactive to eye closure. Symmetric low-amplitude frontal beta in wakefulness.  Voltage: Normal  Anterior-Posterior Gradient: Present  Other background findings: None  Breach: Absent    Background Slowing:  Generalized slowing: None  Focal slowing: None    State Changes:   Drowsiness is characterized by fragmentation, attenuation, and slowing of the background activity.  Stage 2 sleep is characterized by symmetric K complexes and sleep spindles.     Interictal Findings:  Rare fragmented generalized spike-wave discharges or <1-second bursts of theta activity with intermixed spikes, at times overriding a K complex.    Electrographic and Electroclinical seizures:  None    Other Clinical Events:  Events with no associated electrographic seizure:  -Events in beginning of recording, such as at 10:36 and 10:37, while lying in bed, of sudden jerky b/l shoulder and UE movements, appearing to jump slightly in bed (moving body toward head of bed), or sudden truncal flexion with b/l shoulder flexion.  -Multiple event button presses. At times, no clinical change seen on video. On video, patient is awake, using smartphone or talking to others in the room or playing with pulse oximeter, or at times resting in bed. At times there is prominent blinking and/or dropping of the head sideways to rest on the bed, or sudden neck extension with head hitting pillow and eyes open. At times a brief single kicking movement of BLE. Pt at times sits up from lying supine to press the button herself during a period of prominent blinking or just before an event. EEG shows awake background and no electrographic seizure. At times, button is pressed after an arousal from sleep.  -Event button presses 06:37, 06:38: Lying in bed, sudden brief kicking movement with jumping movement of the body. EEG shows awake background and no electrographic seizure.    Activation Procedures:   Hyperventilation is not performed.    Photic stimulation is performed and does not elicit any abnormalities.      Artifacts:  Intermittent myogenic and movement artifacts are present.    Single-lead EKG: Regular rhythm    EEG Classification / Summary:  Abnormal EEG in the awake, drowsy, and asleep states.   -Rare fragmented generalized spike-wave discharges or <1-second bursts of theta activity with intermixed spikes  -Multiple events of a brief single BLE kick and/or body jumping slightly in bed, prominent blinking, dropping of the head to rest on the bed, or sudden neck extension with head hitting the pillow and eyes open. Patient at times presses the button herself, including sitting up to press the button during a period of blinking. EEG shows no electrographic seizure during these times.  -No electrographic seizures.    Clinical Impression:  -Findings are consistent with generalized epilepsy  -The captured events are likely non-epileptic in etiology.  -No seizures captured.        -------------------------------------------------------------------------------------------------------    Claudia Ramos MD  Attending Physician, [facility]    -------------------------------------------------------------------------------------------------------    To reach EEG technologist:  Please use the pager number for the appropriate hospital or contact the .  At [facility] - Pager #: [phone]    To reach EEG-reading physician:  [facility] EEG Reading Room Phone #: [phone]  Epilepsy Answering Service after 5PM and before 8:30AM: Phone #: [phone]     GERALD TRAN [id number]     Study Date: 8/6/24 10:14 - 8/7/24 11:46  Duration: 24 hr 11 min  --------------------------------------------------------------------------------------------------  History:  CC/ HPI Patient is a 49y old  Female who presents with a chief complaint of seizure like activity (06 Aug 2024 14:47)    MEDICATIONS  (STANDING):  chlorhexidine 2% Cloths 1 Application(s) Topical daily  gabapentin 400 milliGRAM(s) Oral two times a day  lamoTRIgine 150 milliGRAM(s) Oral two times a day  pantoprazole    Tablet 40 milliGRAM(s) Oral before breakfast    --------------------------------------------------------------------------------------------------  Study Interpretation:    [[[Abbreviation Key:  PDR=alpha rhythm/posterior dominant rhythm. A-P=anterior posterior.  Amplitude: ‘very low’:<20; ‘low’:20-49; ‘medium’:; ‘high’:>150uV.  Persistence for periodic/rhythmic patterns (% of epoch) ‘rare’:<1%; ‘occasional’:1-10%; ‘frequent’:10-50%; ‘abundant’:50-90%; ‘continuous’:>90%.  Persistence for sporadic discharges: ‘rare’:<1/hr; ‘occasional’:1/min-1/hr; ‘frequent’:>1/min; ‘abundant’:>1/10 sec.  RPP=rhythmic and periodic patterns; GRDA=generalized rhythmic delta activity; FIRDA=frontal intermittent GRDA; LRDA=lateralized rhythmic delta activity; TIRDA=temporal intermittent rhythmic delta activity;  LPD=PLED=lateralized periodic discharges; GPD=generalized periodic discharges; BIPDs =bilateral independent periodic discharges; Mf=multifocal; SIRPDs=stimulus induced rhythmic, periodic, or ictal appearing discharges; BIRDs=brief potentially ictal rhythmic discharges >4 Hz, lasting .5-10s; PFA (paroxysmal bursts >13 Hz or =8 Hz <10s).  Modifiers: +F=with fast component; +S=with spike component; +R=with rhythmic component.  S-B=burst suppression pattern.  Max=maximal. N1-drowsy; N2-stage II sleep; N3-slow wave sleep. SSS/BETS=small sharp spikes/benign epileptiform transients of sleep. HV=hyperventilation; PS=photic stimulation]]]    Daily EEG Visual Analysis    FINDINGS:      Background:  Continuity: Continuous  Symmetry: Symmetric  Posterior dominant rhythm (PDR): 11 Hz, reactive to eye closure. Symmetric low-amplitude frontal beta in wakefulness.  Voltage: Normal  Anterior-Posterior Gradient: Present  Other background findings: None  Breach: Absent    Background Slowing:  Generalized slowing: None  Focal slowing: None    State Changes:   Drowsiness is characterized by fragmentation, attenuation, and slowing of the background activity.  Stage 2 sleep is characterized by symmetric K complexes and sleep spindles.     Interictal Findings:  Rare fragmented generalized spike-wave discharges or <1-second bursts of theta activity with intermixed spikes, at times overriding a K complex.    Electrographic and Electroclinical seizures:  None    Other Clinical Events:  Events with no associated electrographic seizure:  -Events in beginning of recording, such as at 10:36 and 10:37, while lying in bed, of sudden jerky b/l shoulder and UE movements, appearing to jump slightly in bed (moving body toward head of bed), or sudden truncal flexion with b/l shoulder flexion.  -Multiple event button presses. At times, no clinical change seen on video. On video, patient is awake, using smartphone or talking to others in the room or playing with pulse oximeter, or at times resting in bed. At times there is prominent blinking and/or dropping of the head sideways to rest on the bed, or sudden neck extension with head hitting pillow and eyes open. At times a brief single kicking movement of BLE. Pt at times sits up from lying supine to press the button herself during a period of prominent blinking or just before an event. EEG shows awake background and no electrographic seizure. At times, button is pressed after an arousal from sleep.  -Event button presses 06:37, 06:38: Lying in bed, sudden brief kicking movement with jumping movement of the body. EEG shows awake background and no electrographic seizure.  -Event 8/7/24 10:23: Appears to be in distress or anxious and motions to get someone's attention, then sits up and forcefully leans backward, hitting her back against the bed  -Event 8/7/24 11:02: Lying in bed, sudden gasp and BLE single kick.  -Event 8/7/24 11:14: Patient is lying in bed holding smartphone up in front of her face with left hand (right hand also touching phone, b/l elbows flexed antigravity), then suddenly drops the phone and leans her head to the right, resting in bed very briefly, then sits up and presses the event button.    Activation Procedures:   Hyperventilation is not performed.    Photic stimulation is performed and does not elicit any abnormalities.      Artifacts:  Intermittent myogenic and movement artifacts are present.    Single-lead EKG: Regular rhythm    EEG Classification / Summary:  Abnormal EEG in the awake, drowsy, and asleep states.   -Rare fragmented generalized spike-wave discharges or <1-second bursts of theta activity with intermixed spikes  -Multiple events of a brief single BLE kick and/or body jumping slightly in bed, prominent blinking, dropping of the head to rest on the bed, or sudden neck extension with head hitting the pillow and eyes open. Patient at times presses the button herself, including sitting up to press the button during a period of blinking. EEG shows no electrographic seizure during these times.  -No electrographic seizures.    Clinical Impression:  -Findings are consistent with generalized epilepsy  -The captured events are likely non-epileptic in etiology.  -No seizures captured.        -------------------------------------------------------------------------------------------------------    Claudia Ramos MD  Attending Physician, [facility] [place]    -------------------------------------------------------------------------------------------------------    To reach EEG technologist:  Please use the pager number for the appropriate hospital or contact the .  At [facility] - Pager #: [phone]    To reach EEG-reading physician:  [facility] EEG Reading Room Phone #: [phone]  Epilepsy Answering Service after 5PM and before 8:30AM: Phone #: [phone]

## 2024-08-07 NOTE — PROGRESS NOTE ADULT - SUBJECTIVE AND OBJECTIVE BOX
Multiple episodes as described in EEG report with event button pressed.  She has no new c/o.    Vital Signs Last 24 Hrs  T(C): 36.8 (07 Aug 2024 10:10), Max: 36.8 (06 Aug 2024 16:05)  T(F): 98.3 (07 Aug 2024 10:10), Max: 98.3 (06 Aug 2024 16:05)  HR: 65 (07 Aug 2024 10:10) (65 - 76)  BP: 128/75 (07 Aug 2024 10:10) (120/82 - 138/81)  BP(mean): --  RR: 17 (07 Aug 2024 10:10) (16 - 18)  SpO2: 100% (07 Aug 2024 10:10) (99% - 100%)    Parameters below as of 07 Aug 2024 10:10  Patient On (Oxygen Delivery Method): room air    Physical examination   General: No acute distress, Awake, Alert.       Mental status   Awake, alert, and gives detailed history  Face sym.   Moving all extremities symmetrically.         Study Date: 8/6/24 10:14 - 8/7/24 08:00  Duration: 20 hr 27 min  EEG Classification / Summary:  Abnormal EEG in the awake, drowsy, and asleep states.   -Rare fragmented generalized spike-wave discharges or <1-second bursts of theta activity with intermixed spikes  -Multiple events of a brief single BLE kick and/or body jumping slightly in bed, prominent blinking, dropping of the head to rest on the bed, or sudden neck extension with head hitting the pillow and eyes open. Patient at times presses the button herself, including sitting up to press the button during a period of blinking. EEG shows no electrographic seizure during these times.  -No electrographic seizures.    Clinical Impression:  -Findings are consistent with generalized epilepsy  -The captured events are likely non-epileptic in etiology.  -No seizures captured.
Patient is a 49y old  Female who presents with a chief complaint of Convulsions     (07 Aug 2024 13:56)        SUBJECTIVE / OVERNIGHT EVENTS: Pt seen and examined at 10:37am, no overnight events noted, pt reports cough and asking for cough medicine, per nsg pt has not been coughing, pt is asking for ptsd specialist. No other new issues reported.        MEDICATIONS  (STANDING):  chlorhexidine 2% Cloths 1 Application(s) Topical daily  gabapentin 400 milliGRAM(s) Oral two times a day  lamoTRIgine 150 milliGRAM(s) Oral two times a day  pantoprazole    Tablet 40 milliGRAM(s) Oral before breakfast    MEDICATIONS  (PRN):  acetaminophen     Tablet .. 650 milliGRAM(s) Oral every 6 hours PRN Temp greater or equal to 38C (100.4F), Mild Pain (1 - 3)  LORazepam   Injectable 2 milliGRAM(s) IV Push once PRN seizure activity or GTC > 3 minutes or significant derangement of vital signs.  marijuana, medical 1 Capsule(s) Oral four times a day PRN pain  melatonin 9 milliGRAM(s) Oral at bedtime PRN Insomnia  traZODone 100 milliGRAM(s) Oral at bedtime PRN insomnia      Vital Signs Last 24 Hrs  T(C): 36.8 (07 Aug 2024 10:10), Max: 36.8 (07 Aug 2024 10:10)  T(F): 98.3 (07 Aug 2024 10:10), Max: 98.3 (07 Aug 2024 10:10)  HR: 65 (07 Aug 2024 10:10) (65 - 73)  BP: 128/75 (07 Aug 2024 10:10) (120/82 - 128/75)  BP(mean): --  RR: 17 (07 Aug 2024 10:10) (16 - 18)  SpO2: 100% (07 Aug 2024 10:10) (99% - 100%)    Parameters below as of 07 Aug 2024 10:10  Patient On (Oxygen Delivery Method): room air      CAPILLARY BLOOD GLUCOSE        I&O's Summary      PHYSICAL EXAM:  GENERAL: NAD  CHEST/LUNG: Clear to auscultation bilaterally; No wheeze  HEART: Regular rate and rhythm  ABDOMEN: Soft, Nontender, Nondistended  EXTREMITIES: no LE edema  PSYCH: Calm  NEUROLOGY: AA answers questions  SKIN: No rashes or lesions    LABS:                        14.1   7.52  )-----------( 301      ( 07 Aug 2024 05:18 )             42.4     08-07    135  |  100  |  8   ----------------------------<  106<H>  4.7   |  23  |  0.83    Ca    10.2      07 Aug 2024 05:18  Phos  2.8     08-07  Mg     2.00     08-07    TPro  7.1  /  Alb  4.4  /  TBili  0.3  /  DBili  x   /  AST  15  /  ALT  7   /  AlkPhos  108  08-06      CARDIAC MARKERS ( 06 Aug 2024 11:59 )  x     / x     / x     / x     / 1.6 ng/mL  CARDIAC MARKERS ( 06 Aug 2024 04:18 )  x     / x     / 88 U/L / x     / 1.8 ng/mL      Urinalysis Basic - ( 07 Aug 2024 05:18 )    Color: x / Appearance: x / SG: x / pH: x  Gluc: 106 mg/dL / Ketone: x  / Bili: x / Urobili: x   Blood: x / Protein: x / Nitrite: x   Leuk Esterase: x / RBC: x / WBC x   Sq Epi: x / Non Sq Epi: x / Bacteria: x        RADIOLOGY & ADDITIONAL TESTS:  EEG REPORT: -Findings are consistent with generalized epilepsy  -The captured events are likely non-epileptic in etiology.  -No seizures captured.    Imaging Personally Reviewed:    Consultant(s) Notes Reviewed:      Care Discussed with Consultants/Other Providers:  
Patient is a 49y old  Female who presents with a chief complaint of seizure like activity (05 Aug 2024 17:51)      SUBJECTIVE / OVERNIGHT EVENTS: Pt seen and examined at 11:25am, overnight events noted, pt reports that she is having focal seizures and says that she is not able to talk but speaking to me, eeg in progress. No other new issues reported.    MEDICATIONS  (STANDING):  chlorhexidine 2% Cloths 1 Application(s) Topical daily  gabapentin 400 milliGRAM(s) Oral two times a day  lamoTRIgine 150 milliGRAM(s) Oral two times a day  pantoprazole    Tablet 40 milliGRAM(s) Oral before breakfast    MEDICATIONS  (PRN):  acetaminophen     Tablet .. 650 milliGRAM(s) Oral every 6 hours PRN Temp greater or equal to 38C (100.4F), Mild Pain (1 - 3)  LORazepam   Injectable 2 milliGRAM(s) IV Push once PRN seizure activity or GTC > 3 minutes or significant derangement of vital signs.  marijuana, medical 1 Capsule(s) Oral four times a day PRN pain  melatonin 9 milliGRAM(s) Oral at bedtime PRN Insomnia      Vital Signs Last 24 Hrs  T(C): 36.6 (06 Aug 2024 12:25), Max: 37.6 (05 Aug 2024 15:49)  T(F): 97.9 (06 Aug 2024 12:25), Max: 99.6 (05 Aug 2024 15:49)  HR: 76 (06 Aug 2024 12:25) (72 - 80)  BP: 110/77 (06 Aug 2024 12:25) (110/77 - 143/80)  BP(mean): --  RR: 18 (06 Aug 2024 12:25) (17 - 19)  SpO2: 100% (06 Aug 2024 12:25) (99% - 100%)    Parameters below as of 06 Aug 2024 12:25  Patient On (Oxygen Delivery Method): room air      CAPILLARY BLOOD GLUCOSE        I&O's Summary      PHYSICAL EXAM:  GENERAL: NAD  CHEST/LUNG: Clear to auscultation bilaterally; No wheeze  HEART: Regular rate and rhythm  ABDOMEN: Soft, Nontender, Nondistended  EXTREMITIES: no LE edema  PSYCH: Calm  NEUROLOGY: AA answers questions  SKIN: No rashes or lesions    LABS:                        13.9   8.90  )-----------( 317      ( 06 Aug 2024 04:18 )             41.5     08-06    139  |  102  |  7   ----------------------------<  102<H>  4.0   |  22  |  0.77    Ca    10.4      06 Aug 2024 04:18  Phos  3.0     08-06  Mg     2.00     08-06    TPro  7.1  /  Alb  4.4  /  TBili  0.3  /  DBili  x   /  AST  15  /  ALT  7   /  AlkPhos  108  08-06      CARDIAC MARKERS ( 06 Aug 2024 11:59 )  x     / x     / x     / x     / 1.6 ng/mL  CARDIAC MARKERS ( 06 Aug 2024 04:18 )  x     / x     / 88 U/L / x     / 1.8 ng/mL  CARDIAC MARKERS ( 05 Aug 2024 06:53 )  x     / x     / 114 U/L / x     / x          Urinalysis Basic - ( 06 Aug 2024 04:18 )    Color: x / Appearance: x / SG: x / pH: x  Gluc: 102 mg/dL / Ketone: x  / Bili: x / Urobili: x   Blood: x / Protein: x / Nitrite: x   Leuk Esterase: x / RBC: x / WBC x   Sq Epi: x / Non Sq Epi: x / Bacteria: x        RADIOLOGY & ADDITIONAL TESTS:    Imaging Personally Reviewed:    Consultant(s) Notes Reviewed:      Care Discussed with Consultants/Other Providers:

## 2024-08-07 NOTE — DISCHARGE NOTE PROVIDER - NSDCFUADDAPPT_GEN_ALL_CORE_FT
Follow up with your psychiatrist Dr. Geraldine Thomas in [facility] [phone] Follow up with your Neurologist Dr. Rosas.

## 2024-08-07 NOTE — DISCHARGE NOTE PROVIDER - NSDCMRMEDTOKEN_GEN_ALL_CORE_FT
acetaminophen 325 mg oral tablet: 2 tab(s) orally every 6 hours As needed Temp greater or equal to 38C (100.4F), Mild Pain (1 - 3)  ascorbic acid 500 mg oral tablet: 1 tab(s) orally once a day  cholecalciferol 25 mcg (1000 intl units) oral tablet: 1 tab(s) orally once a day  gabapentin 400 mg oral capsule: 1 cap(s) orally 2 times a day  lamoTRIgine 150 mg oral tablet: 1 tab(s) orally 2 times a day  magnesium (as citrate) 83 mg oral tablet, chewable: 2 tab(s) orally once a day  omeprazole 40 mg oral delayed release capsule: 1 cap(s) orally once a day  riboflavin: 1 once a day  traZODone 100 mg oral tablet: 1 tab(s) orally once a day (at bedtime) As needed insomnia  Turmeric: once a day

## 2024-08-07 NOTE — DIETITIAN INITIAL EVALUATION ADULT - OTHER INFO
Per chart, 49-year-old female with history of epilepsy on lamotrigine, bipolar disorder, endometriosis comes into the ED for evaluation of what she reports to be breakthrough focal seizures. She follows w/ neuro Dr. Rosas; recent MRI w/ him unremarkable for acute findings (she has results w/ her in MyChart). Neuro following, EMU admission and EEG pending. Psych also consulted.      Nutrition interview: No reports of any recent episodes of nausea, vomiting, diarrhea or constipation, Last BM noted today per Pt. Denies any chewing/swallowing difficulties. No food allergies. Stated UBW: ~155#, in line with current wt 155# (8/5). Food preferences explored and noted. Intake is good-excellent (%) per RN flowsheets and per pt. Feeding skills: independent. Pt states that she thinks she lost weight in the last x5d 2/2 poor PO intake (since Friday), Pt with no wt loss per EMR, as weight is the same as UBW reported.     RD attempted to continue to ask nutrition interview questions, however Pt asked to stop interview for reasons unknown.

## 2024-08-07 NOTE — DISCHARGE NOTE NURSING/CASE MANAGEMENT/SOCIAL WORK - PATIENT PORTAL LINK FT
You can access the FollowMyHealth Patient Portal offered by [facility] by registering at the following website: [email or web address]. By joining [facility]’s FollowMyHealth portal, you will also be able to view your health information using other applications (apps) compatible with our system.

## 2024-08-07 NOTE — DISCHARGE NOTE PROVIDER - NSDCFUADDINST_GEN_ALL_CORE_FT
Seizure, fall and aspiration precautions. Avoid sleep deprivation. Advise pt not to drive, operate heavy machinery, avoid heights, pools, bathtubs, locked doors.

## 2024-08-20 ENCOUNTER — OUTPATIENT (OUTPATIENT)
Dept: OUTPATIENT SERVICES | Facility: HOSPITAL | Age: 49
LOS: 1 days | Discharge: STILL ADMITTED OR ADMINISTRATIVE | End: 2024-08-20

## 2024-09-23 NOTE — ED ADULT TRIAGE NOTE - PATIENT ON (OXYGEN DELIVERY METHOD)
unable to care for self unable to care for self unable to care for self unable to care for self unable to care for self unable to care for self unable to care for self unable to care for self unable to care for self unable to care for self unable to care for self unable to care for self room air unable to care for self unable to care for self unable to care for self unable to care for self unable to care for self unable to care for self

## 2024-09-24 ENCOUNTER — EMERGENCY (EMERGENCY)
Facility: HOSPITAL | Age: 49
LOS: 1 days | Discharge: HOME | End: 2024-09-24
Attending: STUDENT IN AN ORGANIZED HEALTH CARE EDUCATION/TRAINING PROGRAM | Admitting: STUDENT IN AN ORGANIZED HEALTH CARE EDUCATION/TRAINING PROGRAM
Payer: MEDICARE

## 2024-09-24 VITALS
TEMPERATURE: 98 F | WEIGHT: 154.98 LBS | HEART RATE: 94 BPM | OXYGEN SATURATION: 99 % | SYSTOLIC BLOOD PRESSURE: 138 MMHG | RESPIRATION RATE: 17 BRPM | DIASTOLIC BLOOD PRESSURE: 88 MMHG

## 2024-09-24 PROCEDURE — 99284 EMERGENCY DEPT VISIT MOD MDM: CPT

## 2024-09-24 RX ORDER — LORAZEPAM 4 MG/ML
1 INJECTION INTRAMUSCULAR; INTRAVENOUS ONCE
Refills: 0 | Status: DISCONTINUED | OUTPATIENT
Start: 2024-09-24 | End: 2024-09-24

## 2024-09-24 RX ADMIN — LORAZEPAM 1 MILLIGRAM(S): 4 INJECTION INTRAMUSCULAR; INTRAVENOUS at 12:14

## 2024-09-24 NOTE — ED ADULT TRIAGE NOTE - CHIEF COMPLAINT QUOTE
pt with Bipolar, states "I feel manic and I am having a hard time controlling it" Denies Si/hi/ah/vh. pt corporative in triage. pt states "I just feel angry"

## 2024-09-24 NOTE — ED PROVIDER NOTE - CLINICAL SUMMARY MEDICAL DECISION MAKING FREE TEXT BOX
Jass Washington, DO: 50 yo f pmh primary generalized epilepsy, bipolar disorder (Hx of psych admission w/ SI in 5/2024), endometriosis p/w breakthrough focal seizures, pw agitation. pt reports several weeks of anxiety and agitation. feels that she is short tempered, attributes this to her bipolar. Reports that she has relayed this to her outpatient psychiatrist at [facility], Dr. Garcia.  Has been on Vraylar for the last 3 weeks approximately.  Reports that it has been moderately successful.  Was prescribed propranolol however has not arrived to her house yet.  Reports that she needs assistance because she is feeling very anxious.  Denies judson.  Denies poor sleep, hallucinations, SI/HI.  Denies N/v,f /C, cough, congestion.  Was able to reach Dr. Garcia today prompting her evaluation here today.  Patient arrives HDS well-appearing, neurovascular intact, AAOx4 and coherent.  Offered patient inpatient evaluation for possible stabilization however she declines.  Reports that she feels she is doing well just needs something acutely.  States will follow-up with psychiatry.  Will provide anxiolytic and DC.

## 2024-09-24 NOTE — ED PROVIDER NOTE - PHYSICAL EXAMINATION
General: nad  HEENT: ncat   Neck: trachea ml   CV: well perfused   Resp: non labored breathing   MSK: full range of motion, no cyanosis, no edema, no clubbing, no immobility  Neuro: CN II-XII grossly intact, muscle strength 5/5 in all extremities, normal gait  Skin: no rashes, skin intact   psych: cooperative

## 2024-09-24 NOTE — ED PROVIDER NOTE - PATIENT PORTAL LINK FT
You can access the FollowMyHealth Patient Portal offered by [facility] by registering at the following website: [email or web address]. By joining [employer]’s FollowMyHealth portal, you will also be able to view your health information using other applications (apps) compatible with our system.

## 2024-09-24 NOTE — ED PROVIDER NOTE - NSFOLLOWUPINSTRUCTIONS_ED_ALL_ED_FT
1) Please follow up with your Primary Care Provider in 24-48 hours  2) Seek immediate medical care for any new or returning symptoms including but not limited severe pain, high fevers, thoughts of wanting to hurt yourself and/or others

## 2024-10-01 ENCOUNTER — EMERGENCY (EMERGENCY)
Facility: HOSPITAL | Age: 49
LOS: 1 days | Discharge: HOME | End: 2024-10-01
Attending: EMERGENCY MEDICINE
Payer: MEDICARE

## 2024-10-01 VITALS
HEART RATE: 77 BPM | TEMPERATURE: 98 F | DIASTOLIC BLOOD PRESSURE: 85 MMHG | OXYGEN SATURATION: 98 % | SYSTOLIC BLOOD PRESSURE: 143 MMHG | WEIGHT: 160.94 LBS | RESPIRATION RATE: 18 BRPM

## 2024-10-01 PROCEDURE — 99284 EMERGENCY DEPT VISIT MOD MDM: CPT

## 2024-10-01 RX ORDER — IBUPROFEN 600 MG
600 TABLET ORAL ONCE
Refills: 0 | Status: COMPLETED | OUTPATIENT
Start: 2024-10-01 | End: 2024-10-01

## 2024-10-01 RX ORDER — DIAZEPAM 10 MG
2 TABLET ORAL ONCE
Refills: 0 | Status: DISCONTINUED | OUTPATIENT
Start: 2024-10-01 | End: 2024-10-01

## 2024-10-01 RX ORDER — IBUPROFEN 600 MG
1 TABLET ORAL
Qty: 20 | Refills: 0
Start: 2024-10-01

## 2024-10-01 RX ORDER — DIAZEPAM 10 MG
1 TABLET ORAL
Qty: 9 | Refills: 0
Start: 2024-10-01

## 2024-10-01 RX ADMIN — Medication 600 MILLIGRAM(S): at 03:19

## 2024-10-01 RX ADMIN — Medication 2 MILLIGRAM(S): at 03:19

## 2024-10-01 NOTE — ED PROVIDER NOTE - NSFOLLOWUPCLINICS_GEN_ALL_ED_FT
[facility]  Internal Medicine  [address] [place] [address]  Phone: [phone]  Fax: [phone]

## 2024-10-01 NOTE — ED PROVIDER NOTE - PATIENT PORTAL LINK FT
You can access the FollowMyHealth Patient Portal offered by [facility] by registering at the following website: [email or web address]. By joining [employer]’s FollowMyHealth portal, you will also be able to view your health information using other applications (apps) compatible with our system.

## 2024-10-01 NOTE — ED PROVIDER NOTE - PHYSICAL EXAMINATION
No distress  No cervical, thoracic or lumbosacral midline bony deformities,  +rotation and flexion-extension of neck and truncal area intact.  B/l distal radial and ulnar pulses intact, cap refill < 2 seconds, full range of motion of arm +elbow flexion/extension/supination and pronation intact, +flexion and extension of all digits at DIP and PIP.   Right upper trapezius area tenderness,

## 2024-10-01 NOTE — ED ADULT NURSE NOTE - NSFALLUNIVINTERV_ED_ALL_ED
Bed/Stretcher in lowest position, wheels locked, appropriate side rails in place/Call bell, personal items and telephone in reach/Instruct patient to call for assistance before getting out of bed/chair/stretcher/Non-slip footwear applied when patient is off stretcher/[place] to call system/Physically safe environment - no spills, clutter or unnecessary equipment/Purposeful proactive rounding/Room/bathroom lighting operational, light cord in reach

## 2024-10-01 NOTE — ED PROVIDER NOTE - OBJECTIVE STATEMENT
49-year-old female history of degenerative disc disease of cervical thoracic spine.  Patient with tenderness to left upper trapezius area  since yesterday.  Patient denies any motor weakness.  No chest pain, no shortness of breath.  Tenderness reproducible with neck rotation to left and arm abduction.  Patient states pain persistent despite home baclofen, Robaxin and Flexeril.

## 2024-10-01 NOTE — ED PROVIDER NOTE - NSFOLLOWUPINSTRUCTIONS_ED_ALL_ED_FT
A muscle strain is an injury that occurs when a muscle is stretched beyond its normal length. Usually, a small number of muscle fibers are torn when this happens. There are three types of muscle strains. First-degree strains have the least amount of muscle fiber tearing and the least amount of pain. Second-degree and third-degree strains have more tearing and pain.    Usually, recovery from muscle strain takes 1–2 weeks. Complete healing normally takes 5–6 weeks.    What are the causes?  This condition is caused when a sudden, violent force is placed on a muscle and stretches it too far. This may occur with a fall, while lifting, or during sports.    What increases the risk?  This condition is more likely to develop in athletes and people who are physically active.    What are the signs or symptoms?  Symptoms of this condition include:  Pain.  Tenderness.  Bruising.  Swelling.  Trouble using the muscle.  How is this diagnosed?  This condition is diagnosed based on a physical exam and your medical history. Tests may also be done, including an X-ray, ultrasound, or MRI.    How is this treated?  This condition is initially treated with PRICE therapy. This therapy involves:  Protecting the muscle from being injured again.  Resting the injured muscle.  Icing the injured muscle.  Applying pressure (compression) to the injured muscle. This may be done with a splint or elastic bandage.  Raising (elevating) the injured muscle.  Your health care provider may also recommend medicine for pain.    Follow these instructions at home:  If you have a removable splint:    Wear the splint as told by your health care provider. Remove it only as told by your health care provider.  Check the skin around the splint every day. Tell your health care provider about any concerns.  Loosen the splint if your fingers or toes tingle, become numb, or turn cold and blue.  Keep the splint clean.  If the splint is not waterproof:  Do not let it get wet.  Cover it with a watertight covering when you take a bath or a shower.  Managing pain, stiffness, and swelling    Bag of ice on a towel on the skin.  If directed, put ice on the injured area. To do this:  If you have a removable splint, remove it as told by your health care provider.  Put ice in a plastic bag.  Place a towel between your skin and the bag.  Leave the ice on for 20 minutes, 2–3 times a day.  Remove the ice if your skin turns bright red. This is very important. If you cannot feel pain, heat, or cold, you have a greater risk of damage to the area.  Move your fingers or toes often to reduce stiffness and swelling.  Raise (elevate) the injured area above the level of your heart while you are sitting or lying down.  Wear an elastic bandage as told by your health care provider. Make sure that it is not too tight.  General instructions    Take over-the-counter and prescription medicines only as told by your health care provider. Treatment may include muscle relaxants or medicines for pain and inflammation that are taken by mouth or applied to the skin.  Restrict your activity and rest the injured muscle as told by your health care provider. Gentle movements may be allowed.  If physical therapy was prescribed, do exercises as told by your health care provider.  Do not put pressure on any part of the splint until it is fully hardened. This may take several hours.  Do not use any products that contain nicotine or tobacco. These products include cigarettes, chewing tobacco, and vaping devices, such as e-cigarettes. If you need help quitting, ask your health care provider.  Ask your health care provider when it is safe to drive if you have a splint.  Keep all follow-up visits. This is important.  How is this prevented?  Warm up before exercising. This helps to prevent future muscle strains.    Contact a health care provider if:  You have more pain or swelling in the injured area.  Get help right away if:  You have numbness or tingling in the injured area.  You lose a lot of strength in the injured area.  Summary  A muscle strain is an injury that occurs when a muscle is stretched beyond its normal length.  This condition is caused when a sudden, violent force is placed on a muscle and stretches it too far.  This condition is initially treated with PRICE therapy, which involves protecting, resting, icing, compressing, and elevating.  Gentle movements may be allowed. If physical therapy was prescribed, do exercises as told by your health care provider.  This information is not intended to replace advice given to you by your health care provider. Make sure you discuss any questions you have with your health care provider.

## 2024-10-01 NOTE — ED ADULT TRIAGE NOTE - PAIN RATING/NUMBER SCALE (0-10): ACTIVITY
Tahmina Crooks was in the clinic today to see Spring Queen MD for Patient presents with:  Follow-up: with sister Viri  Office Visit  .    Please note, her blood pressures were elevated x2 while in the clinic.    BP Readings from Last 1 Encounters:  01/19/21 1232 : (!) 154/80  01/19/21 1135 : (!) 146/79      Please review with PCP and follow up with patient as appropriate.   
6 (moderate pain)

## 2024-10-01 NOTE — ED ADULT NURSE NOTE - OBJECTIVE STATEMENT
Pt present to the ED with c/o neck and back pain x few hours ago. Denies recent fall or injury or trauma .

## 2024-10-01 NOTE — ED PROVIDER NOTE - IN ACCORDANCE WITH [place] STATE LAW, WE OFFER EVERY PATIENT A HEPATITIS C TEST. WOULD YOU LIKE TO BE TESTED TODAY?
Opt out Post-Care Instructions: I reviewed with the patient in detail post-care instructions. Patient is to keep the biopsy site daily with warm soap and water and apply vaseline daily until healed.

## 2024-10-01 NOTE — ED PROVIDER NOTE - CLINICAL SUMMARY MEDICAL DECISION MAKING FREE TEXT BOX
Patient states she feels better after receiving ibuprofen and Valium.  Patient states has follow-up with pain management for trigger point injection.  Pt is well appearing, has no new complaints and able to walk with normal gait. Pt is stable for discharge and follow up with medical doctor. Pt educated on care and need for follow up. Discussed anticipatory guidance and return precautions. Questions answered. I had a detailed discussion with the patient regarding the historical points, exam findings, and any diagnostic results supporting the discharge diagnosis.

## 2024-10-06 ENCOUNTER — EMERGENCY (EMERGENCY)
Facility: HOSPITAL | Age: 49
LOS: 1 days | Discharge: LEFT BEFORE DISCHARGE | End: 2024-10-06
Payer: MEDICARE

## 2024-10-06 VITALS
OXYGEN SATURATION: 97 % | WEIGHT: 163.14 LBS | HEART RATE: 72 BPM | SYSTOLIC BLOOD PRESSURE: 146 MMHG | RESPIRATION RATE: 20 BRPM | TEMPERATURE: 98 F | DIASTOLIC BLOOD PRESSURE: 99 MMHG

## 2024-10-06 PROCEDURE — L9991: CPT

## 2024-10-06 NOTE — ED ADULT NURSE NOTE - NS ED PATIENT SAFETY CONCERN
Important Medication Changes:none continue current medications      Further testing to be done:none        Next follow up visit: In office in 1 Year              HERE IS SOME IMPORTANT INFORMATION FOR OUR PATIENTS    If you need refills- call your pharmacist and they will contact our office.    If you have medical concerns call    [phone]. ( [facility] Office)                                                          [phone]  ( [place] office)                                                          [phone] ( [facility] office)                                                           [phone]( [facility]  Office)                   If you have a question during office hours call  and to make appointment [phone]. You will eventually speak with my nurses. If you need to speak to me directly, let them know and I will get back to you as soon as possible.  Better yet, you can consider signing up for [employer], our popular online communication portal to schedule an appointment, ask for a refill, or contact our staff. Go to:  [email or web address]    Chi Treviño MD    
No

## 2024-11-07 PROCEDURE — 90833 PSYTX W PT W E/M 30 MIN: CPT

## 2024-11-07 PROCEDURE — 99215 OFFICE O/P EST HI 40 MIN: CPT

## 2024-11-14 PROCEDURE — 99215 OFFICE O/P EST HI 40 MIN: CPT

## 2024-11-14 PROCEDURE — 90833 PSYTX W PT W E/M 30 MIN: CPT

## 2024-11-20 PROCEDURE — 99283 EMERGENCY DEPT VISIT LOW MDM: CPT

## 2024-12-05 PROCEDURE — 90833 PSYTX W PT W E/M 30 MIN: CPT

## 2024-12-05 PROCEDURE — 99214 OFFICE O/P EST MOD 30 MIN: CPT

## 2024-12-12 PROCEDURE — 99204 OFFICE O/P NEW MOD 45 MIN: CPT

## 2024-12-12 PROCEDURE — 90833 PSYTX W PT W E/M 30 MIN: CPT

## 2024-12-12 PROCEDURE — 99214 OFFICE O/P EST MOD 30 MIN: CPT

## 2024-12-14 NOTE — BH INPATIENT PSYCHIATRY PROGRESS NOTE - NSTXCOPEDATEEST_PSY_ALL_CORE
25-Apr-2024
IV and/or equipment tethered to patient
25-Apr-2024

## 2025-01-09 PROCEDURE — 99215 OFFICE O/P EST HI 40 MIN: CPT | Mod: 95

## 2025-01-09 PROCEDURE — 90833 PSYTX W PT W E/M 30 MIN: CPT | Mod: 93

## 2025-02-06 PROCEDURE — 99214 OFFICE O/P EST MOD 30 MIN: CPT

## 2025-02-06 PROCEDURE — 90833 PSYTX W PT W E/M 30 MIN: CPT

## 2025-05-08 PROCEDURE — 99214 OFFICE O/P EST MOD 30 MIN: CPT

## 2025-06-05 PROCEDURE — 99214 OFFICE O/P EST MOD 30 MIN: CPT

## 2025-06-06 NOTE — ED BEHAVIORAL HEALTH ASSESSMENT NOTE - NSBHMSELANG_PSY_A_CORE
Need for Home Care Services was noted via [employer].    Spoke with PATIENT regarding need for home care. Patient is agreeable to home care services of [facility] .     Homebound criteria was discussed in detail and PATIENT verbalizes understanding. Patient meets homebound criteria as noted in referral.     Information provided to PATIENT along with [facility] contact information. PATIENT understands that home care is an intermittent level of care and visits may not occur daily.  Address, phone number, email, and insurance verified with PATIENT as per Epic records. Best contact number is [phone].     Patient  DOES NOT (does or does not)  have any special communication needs. Those special communication needs are NONE.    Sabrix Fatmata Status: Is the Sabrix fatmata visible on the Storyboard YES (Yes/No). Video visit capable YES (Yes/No).     ([place] Only) Inpatient Palliative Care Order/Consult: N/A (Yes/No)    Active with a skilled home health agency prior to admission? NO (Yes/No)  Does the patient have any unskilled agencies in the home, such as caregivers, cleaning services, meal assistance, private nurse? NO (Yes/No)    Verified by PATIENT AND EPIC ENCOUNTERS  that following provider is DR FATOUMATA ROSALES and willing to follow for home care.     Any scheduling requests: NONE    Presence of bedbugs and/or infestation of any bugs in the home: NO  Provided patient with the following 1st visit reminders:   Have insurance card, medications, and vitamins and OTCs available to review with    Any discharge paperwork for the  to review available   Please write down any questions you have, to review with nurse/therapist during the first visit   For safety, we ask that any pets or firearms are secured in the home prior to nurse/therapist visiting. If pets in the home, please list here for field teammate awareness in event of allergies: NO PETS    Liaison will continue  to follow until discharge.   Anticipated dc date is TODAY.     No abnormalities noted

## 2025-07-22 NOTE — ED PROVIDER NOTE - BIRTH SEX
H&P reviewed. After examining the patient I find no changes in the patients condition since the H&P had been written.    Vitals:    07/22/25 1306   BP: 143/93   Pulse: 84   Resp: 16   Temp: (!) 97.3 °F (36.3 °C)   SpO2: 96%      Female

## 2025-07-24 PROCEDURE — 90833 PSYTX W PT W E/M 30 MIN: CPT

## 2025-07-24 PROCEDURE — 99214 OFFICE O/P EST MOD 30 MIN: CPT
